# Patient Record
Sex: FEMALE | Race: WHITE | Employment: UNEMPLOYED | RURAL
[De-identification: names, ages, dates, MRNs, and addresses within clinical notes are randomized per-mention and may not be internally consistent; named-entity substitution may affect disease eponyms.]

---

## 2017-02-16 ENCOUNTER — TELEPHONE (OUTPATIENT)
Dept: PRIMARY CARE CLINIC | Age: 37
End: 2017-02-16

## 2017-02-16 DIAGNOSIS — J01.90 ACUTE NON-RECURRENT SINUSITIS, UNSPECIFIED LOCATION: ICD-10-CM

## 2017-02-17 RX ORDER — AMOXICILLIN AND CLAVULANATE POTASSIUM 875; 125 MG/1; MG/1
1 TABLET, FILM COATED ORAL 2 TIMES DAILY
Qty: 20 TABLET | Refills: 0 | Status: SHIPPED | OUTPATIENT
Start: 2017-02-17 | End: 2017-02-27

## 2017-03-03 ENCOUNTER — OFFICE VISIT (OUTPATIENT)
Dept: INTERNAL MEDICINE | Facility: CLINIC | Age: 37
End: 2017-03-03

## 2017-03-03 VITALS
OXYGEN SATURATION: 99 % | DIASTOLIC BLOOD PRESSURE: 62 MMHG | SYSTOLIC BLOOD PRESSURE: 102 MMHG | BODY MASS INDEX: 25.3 KG/M2 | WEIGHT: 134 LBS | HEIGHT: 61 IN | TEMPERATURE: 98.1 F | HEART RATE: 77 BPM

## 2017-03-03 DIAGNOSIS — R68.89 FLU-LIKE SYMPTOMS: ICD-10-CM

## 2017-03-03 DIAGNOSIS — F50.9 EATING DISORDER: ICD-10-CM

## 2017-03-03 DIAGNOSIS — R53.82 CHRONIC FATIGUE: ICD-10-CM

## 2017-03-03 DIAGNOSIS — Z00.00 PREVENTATIVE HEALTH CARE: ICD-10-CM

## 2017-03-03 DIAGNOSIS — D50.8 IRON DEFICIENCY ANEMIA SECONDARY TO INADEQUATE DIETARY IRON INTAKE: ICD-10-CM

## 2017-03-03 DIAGNOSIS — R39.9 URINARY SYMPTOM OR SIGN: ICD-10-CM

## 2017-03-03 DIAGNOSIS — R11.2 NON-INTRACTABLE VOMITING WITH NAUSEA, UNSPECIFIED VOMITING TYPE: ICD-10-CM

## 2017-03-03 DIAGNOSIS — N20.0 RECURRENT KIDNEY STONES: Primary | ICD-10-CM

## 2017-03-03 DIAGNOSIS — Z98.84 S/P GASTRIC BYPASS: ICD-10-CM

## 2017-03-03 PROBLEM — R91.1 SOLITARY LUNG NODULE: Status: ACTIVE | Noted: 2017-03-03

## 2017-03-03 LAB
BILIRUB BLD-MCNC: NEGATIVE MG/DL
CLARITY, POC: ABNORMAL
COLOR UR: YELLOW
GLUCOSE UR STRIP-MCNC: NEGATIVE MG/DL
KETONES UR QL: NEGATIVE
LEUKOCYTE EST, POC: ABNORMAL
NITRITE UR-MCNC: NEGATIVE MG/ML
PH UR: 8 [PH] (ref 5–8)
PROT UR STRIP-MCNC: NEGATIVE MG/DL
RBC # UR STRIP: NEGATIVE /UL
SP GR UR: 1.01 (ref 1–1.03)
UROBILINOGEN UR QL: NORMAL

## 2017-03-03 PROCEDURE — 96372 THER/PROPH/DIAG INJ SC/IM: CPT | Performed by: PHYSICIAN ASSISTANT

## 2017-03-03 PROCEDURE — 81003 URINALYSIS AUTO W/O SCOPE: CPT | Performed by: PHYSICIAN ASSISTANT

## 2017-03-03 PROCEDURE — 87804 INFLUENZA ASSAY W/OPTIC: CPT | Performed by: PHYSICIAN ASSISTANT

## 2017-03-03 PROCEDURE — 99214 OFFICE O/P EST MOD 30 MIN: CPT | Performed by: PHYSICIAN ASSISTANT

## 2017-03-03 PROCEDURE — 36415 COLL VENOUS BLD VENIPUNCTURE: CPT | Performed by: PHYSICIAN ASSISTANT

## 2017-03-03 RX ORDER — KETOROLAC TROMETHAMINE 10 MG/1
10 TABLET, FILM COATED ORAL EVERY 6 HOURS PRN
Qty: 30 TABLET | Refills: 0 | Status: SHIPPED | OUTPATIENT
Start: 2017-03-03 | End: 2017-03-08

## 2017-03-03 RX ORDER — PROMETHAZINE HYDROCHLORIDE 25 MG/1
25 TABLET ORAL EVERY 6 HOURS PRN
Qty: 20 TABLET | Refills: 0 | Status: SHIPPED | OUTPATIENT
Start: 2017-03-03 | End: 2018-03-26 | Stop reason: SDUPTHER

## 2017-03-03 RX ORDER — FERROUS GLUCONATE 324(37.5)
TABLET ORAL
COMMUNITY
Start: 2016-03-16 | End: 2017-03-03

## 2017-03-03 RX ORDER — TAMSULOSIN HYDROCHLORIDE 0.4 MG/1
CAPSULE ORAL
COMMUNITY
Start: 2016-11-26 | End: 2017-03-23

## 2017-03-03 RX ORDER — KETOROLAC TROMETHAMINE 15 MG/ML
30 INJECTION, SOLUTION INTRAMUSCULAR; INTRAVENOUS ONCE
Status: COMPLETED | OUTPATIENT
Start: 2017-03-03 | End: 2017-03-03

## 2017-03-03 RX ADMIN — KETOROLAC TROMETHAMINE 30 MG: 15 INJECTION, SOLUTION INTRAMUSCULAR; INTRAVENOUS at 14:53

## 2017-03-03 NOTE — PROGRESS NOTES
Subjective   Luz Maria Andrade is a 36 y.o. female who presents to establish care with me.    Chief Complaint:  Kidney stones every month for at least 6 months. Knows she has one when she has pelvic pain. Most recently has had one for 2 weeks with intermittent pelvic pain. Has had some hematuria when wiping. Taking Flomax prn with stones.  Takes ibuprofen and tylenol for pain but still unable to sleep well due to pain.       Began having nasal congestion and fever last night. Having a little cough and diffuse body aches. No influenza vaccine this year.     About 6 months ago had incidental finding of lung nodule and liver nodule. Has seen Dr. Cordova for lung nodule. Liver follow up showed growth and then later shrinking of the nodule.     Gastric bypass surgery 5/27/16. Since then has not been able to get herself to eat and gets very anxious if she gains any weight.  Feels hungry but mentally overrides this frequently and avoids eating. Goal weight was supposed to be 175 pounds and she is currently 134.       The following portions of the patient's history were reviewed and updated as appropriate: allergies, current medications, past family history, past medical history, past social history, past surgical history and problem list.    Review of Systems  Review of Systems   Constitutional: Positive for chills and fever. Negative for appetite change, fatigue and unexpected weight change.        No malaise   HENT: Positive for congestion, ear pain, rhinorrhea and sore throat. Negative for hearing loss, nosebleeds, trouble swallowing and voice change.    Eyes: Negative for pain, discharge, redness, itching and visual disturbance.        No dry eyes   Respiratory: Negative for cough, shortness of breath and wheezing.         No SOB with exertion  No SOB lying down   Cardiovascular: Negative for chest pain, palpitations and leg swelling.        No leg cramps     Gastrointestinal: Negative for abdominal pain, blood in stool,  "constipation, diarrhea, nausea and vomiting.        No change in bowel habits  No heartburn   Endocrine: Negative for cold intolerance, heat intolerance, polydipsia, polyphagia and polyuria.        No hot flashes  No muscle weakness  No feeling of weakness   Genitourinary: Positive for difficulty urinating, dysuria, hematuria and pelvic pain. Negative for dyspareunia, frequency, menstrual problem, urgency, vaginal discharge and vaginal pain.        No urinary incontinence  No change in periods   Musculoskeletal: Negative for arthralgias, joint swelling and myalgias.        No limb pain  No limb swelling   Skin: Negative for rash and wound.        No rash  No lesions  No change in mole  No itching   Neurological: Negative for dizziness, seizures, syncope, weakness, numbness and headaches.        No confusion  No tingling  No trouble walking   Hematological: Negative for adenopathy. Does not bruise/bleed easily.   Psychiatric/Behavioral: Positive for behavioral problems (will not eat) and sleep disturbance. Negative for confusion, dysphoric mood and suicidal ideas. The patient is not nervous/anxious.         No change in personality         Objective    Visit Vitals   • /62   • Pulse 77   • Temp 98.1 °F (36.7 °C)   • Ht 61\" (154.9 cm)   • Wt 134 lb (60.8 kg)   • SpO2 99%   • BMI 25.32 kg/m2     Physical Exam   Constitutional: She is oriented to person, place, and time. She appears well-developed and well-nourished. No distress.   HENT:   Head: Normocephalic and atraumatic.   Mouth/Throat: Oropharynx is clear and moist. No oropharyngeal exudate.   Left TM perforation. Right TM bulging.    Eyes: EOM are normal. Pupils are equal, round, and reactive to light.   Neck: Normal range of motion. Neck supple. No thyromegaly present.   Cardiovascular: Normal rate, regular rhythm and normal heart sounds.  Exam reveals no gallop and no friction rub.    No murmur heard.  Pulmonary/Chest: Effort normal and breath sounds " normal. No respiratory distress. She has no wheezes. She has no rales. She exhibits no tenderness.   Abdominal: Soft. Bowel sounds are normal. She exhibits no distension and no mass. There is tenderness (RUQ and RLQ tenderness). There is no rebound and no guarding. No hernia.   Musculoskeletal: Normal range of motion.   Lymphadenopathy:     She has no cervical adenopathy.   Neurological: She is alert and oriented to person, place, and time. She exhibits normal muscle tone.   Skin: Skin is warm and dry. She is not diaphoretic.   Psychiatric: She has a normal mood and affect. Her behavior is normal. Judgment and thought content normal.   Nursing note and vitals reviewed.        Assessment/Plan     1. Recurrent kidney stones  - POCT urinalysis dipstick, automated  - Urine culture (clean catch); Future  - Ambulatory Referral to Urology  - Comprehensive Metabolic Panel; Future  - TSH; Future  - PTH, Intact; Future  - Calcium, Ionized; Future  - ketorolac (TORADOL) injection 30 mg; Infuse 30 mg into a venous catheter 1 (One) Time.  - ketorolac (TORADOL) 10 MG tablet; Take 1 tablet by mouth Every 6 (Six) Hours As Needed for moderate pain (4-6) for up to 5 days.  Dispense: 30 tablet; Refill: 0    2. Flu-like symptoms  Influenza A/B negative.   Aggressive rehydration and Tylenol prn.     3. Non-intractable vomiting with nausea, unspecified vomiting type  - promethazine (PHENERGAN) 25 MG tablet; Take 1 tablet by mouth Every 6 (Six) Hours As Needed for nausea or vomiting.  Dispense: 20 tablet; Refill: 0  - Comprehensive Metabolic Panel; Future    4. Eating disorder  - Comprehensive Metabolic Panel; Future  - TSH; Future  - Ambulatory Referral to Psychology  - Vitamin B12; Future  - Vitamin D 25 Hydroxy; Future  - Iron Profile; Future    5. S/P gastric bypass  - Comprehensive Metabolic Panel; Future  - Ambulatory Referral to Psychology  - Vitamin B12; Future  - Vitamin D 25 Hydroxy; Future  - Iron Profile; Future    6.  Preventative health care  - Lipid Panel; Future    7. Iron deficiency anemia secondary to inadequate dietary iron intake  - Iron Profile; Future    8. Chronic fatigue  - Vitamin B12; Future  - Vitamin D 25 Hydroxy; Future  - Iron Profile; Future

## 2017-03-04 LAB
25(OH)D3+25(OH)D2 SERPL-MCNC: 23 NG/ML
ALBUMIN SERPL-MCNC: 4.1 G/DL (ref 3.2–4.8)
ALBUMIN/GLOB SERPL: 1.3 G/DL (ref 1.5–2.5)
ALP SERPL-CCNC: 82 U/L (ref 25–100)
ALT SERPL-CCNC: 10 U/L (ref 7–40)
AST SERPL-CCNC: 19 U/L (ref 0–33)
BILIRUB SERPL-MCNC: 0.3 MG/DL (ref 0.3–1.2)
BUN SERPL-MCNC: 13 MG/DL (ref 9–23)
BUN/CREAT SERPL: 21.7 (ref 7–25)
CA-I SERPL ISE-MCNC: 5.3 MG/DL (ref 4.5–5.6)
CALCIUM SERPL-MCNC: 9.5 MG/DL (ref 8.7–10.4)
CHLORIDE SERPL-SCNC: 103 MMOL/L (ref 99–109)
CHOLEST SERPL-MCNC: 196 MG/DL (ref 0–200)
CO2 SERPL-SCNC: 26 MMOL/L (ref 20–31)
CREAT SERPL-MCNC: 0.6 MG/DL (ref 0.6–1.3)
GLOBULIN SER CALC-MCNC: 3.2 GM/DL
GLUCOSE SERPL-MCNC: 69 MG/DL (ref 70–100)
HDLC SERPL-MCNC: 50 MG/DL (ref 40–60)
IRON SATN MFR SERPL: 17 % (ref 15–50)
IRON SERPL-MCNC: 64 MCG/DL (ref 50–175)
LDLC SERPL CALC-MCNC: 128 MG/DL (ref 0–100)
POTASSIUM SERPL-SCNC: 4.4 MMOL/L (ref 3.5–5.5)
PROT SERPL-MCNC: 7.3 G/DL (ref 5.7–8.2)
PTH-INTACT SERPL-MCNC: 16 PG/ML (ref 15–65)
SODIUM SERPL-SCNC: 138 MMOL/L (ref 132–146)
TIBC SERPL-MCNC: 383 MCG/DL (ref 250–450)
TRIGL SERPL-MCNC: 88 MG/DL (ref 0–150)
TSH SERPL DL<=0.005 MIU/L-ACNC: 1.86 MIU/ML (ref 0.35–5.35)
UIBC SERPL-MCNC: 319 MCG/DL
VIT B12 SERPL-MCNC: 251 PG/ML (ref 211–911)
VLDLC SERPL CALC-MCNC: 17.6 MG/DL

## 2017-03-06 LAB
BACTERIA UR CULT: ABNORMAL
BACTERIA UR CULT: ABNORMAL
OTHER ANTIBIOTIC SUSC ISLT: ABNORMAL

## 2017-03-06 RX ORDER — NITROFURANTOIN 25; 75 MG/1; MG/1
100 CAPSULE ORAL 2 TIMES DAILY
Qty: 14 CAPSULE | Refills: 0 | Status: SHIPPED | OUTPATIENT
Start: 2017-03-06 | End: 2017-03-13

## 2017-03-09 LAB
EXPIRATION DATE: NORMAL
FLUAV AG NPH QL: NEGATIVE
FLUBV AG NPH QL: NEGATIVE
INTERNAL CONTROL: NORMAL
Lab: NORMAL

## 2017-03-21 ENCOUNTER — TELEPHONE (OUTPATIENT)
Dept: INTERNAL MEDICINE | Facility: CLINIC | Age: 37
End: 2017-03-21

## 2017-03-21 NOTE — TELEPHONE ENCOUNTER
Pt said she has a bad kidney stone, the toradol you gave her does not work, she said you told her if that did not work that you would send in tramadol, please call pt back with what to do

## 2017-03-21 NOTE — TELEPHONE ENCOUNTER
I do not prescribe Tramadol. She needs to schedule with Dr. Benton if that is what she feels she needs.

## 2017-03-22 ENCOUNTER — TELEPHONE (OUTPATIENT)
Dept: INTERNAL MEDICINE | Facility: CLINIC | Age: 37
End: 2017-03-22

## 2017-03-22 NOTE — TELEPHONE ENCOUNTER
SPOKE TO PATIENT - INFORMED HER THAT ADRIANNA WAS OUT TODAY BUT PER HER OFFICE NOTE SHE ADVISES HER TO COME IN IF SYMPTOMS GET WORSE OR FAIL TO IMPROVE.    PATIENT STATES SHE HAS SEVERAL APPOINTMENTS COMING UP SO SHE WILL GIVE US A CALL BACK

## 2017-03-22 NOTE — TELEPHONE ENCOUNTER
----- Message from Kisha Gonsales sent at 3/22/2017 11:07 AM EDT -----  Contact: PATIENT   PLEASE RETURN CALL. PATIENT SAID THAT THE PAIN MEDICATION ISN'T HELPING HER KIDNEY STONES. WOULD LIKE TO KNOW WHAT ADRIANNA RECOMMENDS.

## 2017-03-23 ENCOUNTER — OFFICE VISIT (OUTPATIENT)
Dept: UROLOGY | Facility: CLINIC | Age: 37
End: 2017-03-23

## 2017-03-23 ENCOUNTER — HOSPITAL ENCOUNTER (OUTPATIENT)
Dept: GENERAL RADIOLOGY | Facility: HOSPITAL | Age: 37
Discharge: HOME | End: 2017-03-23
Attending: UROLOGY | Admitting: UROLOGY

## 2017-03-23 VITALS
HEIGHT: 61 IN | BODY MASS INDEX: 24.55 KG/M2 | HEART RATE: 88 BPM | OXYGEN SATURATION: 98 % | DIASTOLIC BLOOD PRESSURE: 70 MMHG | SYSTOLIC BLOOD PRESSURE: 125 MMHG | WEIGHT: 130 LBS

## 2017-03-23 DIAGNOSIS — N20.0 NEPHROLITHIASIS: ICD-10-CM

## 2017-03-23 DIAGNOSIS — R10.9 FLANK PAIN: ICD-10-CM

## 2017-03-23 DIAGNOSIS — Z87.442 PERSONAL HISTORY OF KIDNEY STONES: Primary | ICD-10-CM

## 2017-03-23 LAB
BILIRUB BLD-MCNC: NEGATIVE MG/DL
CLARITY, POC: CLEAR
COLOR UR: YELLOW
GLUCOSE UR STRIP-MCNC: NEGATIVE MG/DL
KETONES UR QL: ABNORMAL
LEUKOCYTE EST, POC: NEGATIVE
NITRITE UR-MCNC: NEGATIVE MG/ML
PH UR: 7.5 [PH] (ref 5–8)
PROT UR STRIP-MCNC: NEGATIVE MG/DL
RBC # UR STRIP: NEGATIVE /UL
SP GR UR: 1.02 (ref 1–1.03)
UROBILINOGEN UR QL: NORMAL

## 2017-03-23 PROCEDURE — 99203 OFFICE O/P NEW LOW 30 MIN: CPT | Performed by: UROLOGY

## 2017-03-23 PROCEDURE — 81003 URINALYSIS AUTO W/O SCOPE: CPT | Performed by: UROLOGY

## 2017-03-23 PROCEDURE — 74000 HC ABDOMEN KUB: CPT

## 2017-03-23 RX ORDER — TAMSULOSIN HYDROCHLORIDE 0.4 MG/1
1 CAPSULE ORAL DAILY
Qty: 30 CAPSULE | Refills: 11 | Status: SHIPPED | OUTPATIENT
Start: 2017-03-23 | End: 2017-10-16 | Stop reason: SDUPTHER

## 2017-03-23 RX ORDER — FERROUS GLUCONATE 324(37.5)
TABLET ORAL
COMMUNITY
Start: 2016-03-16 | End: 2017-10-17

## 2017-03-23 RX ORDER — TAMSULOSIN HYDROCHLORIDE 0.4 MG/1
CAPSULE ORAL
COMMUNITY
Start: 2016-11-26 | End: 2017-03-23 | Stop reason: SDUPTHER

## 2017-03-23 RX ORDER — TRAMADOL HYDROCHLORIDE 50 MG/1
50 TABLET ORAL EVERY 6 HOURS PRN
Qty: 30 TABLET | Refills: 1 | Status: SHIPPED | OUTPATIENT
Start: 2017-03-23 | End: 2018-03-23

## 2017-03-23 RX ORDER — ONDANSETRON 4 MG/1
TABLET, ORALLY DISINTEGRATING ORAL EVERY 8 HOURS
COMMUNITY
Start: 2016-09-01 | End: 2017-10-17

## 2017-03-23 RX ORDER — NORGESTIMATE AND ETHINYL ESTRADIOL 7DAYSX3 LO
KIT ORAL
COMMUNITY
Start: 2016-03-14 | End: 2017-10-17

## 2017-03-23 NOTE — PROGRESS NOTES
Chief Complaint  Nephrolithiasis (PATIENT IS BEING SEEN FOR A HISTORY OF KIDNEY STONES AND FLANK PAIN.)      BLAYNE Andrade is a 36 y.o.female who is referred for evaluation of kidney stones stating she has passed 16 stones spontaneously in her life over the last 3-4 years.  CT scan last September revealed numerous punctate stones scattered throughout her urinary tract but none to large to pass.  She has had a gastric bypass which is a risk factor for stones but it had several stones prior to the procedure.  Her family history is an unknown quantity with no knowledge of her parents family she has lost significant weight since her gastric bypass.  She was instructed to discontinue her calcium supplement because of the stones.  She has been able to handle her pain mainly with Ultram and daily Flomax.    Vitals:    03/23/17 0916   BP: 125/70   Pulse: 88   SpO2: 98%       Past Medical History  Past Medical History:   Diagnosis Date   • Anemia    • Clotting disorder    • Kidney stone    • Ovarian cyst        Past Surgical History  Past Surgical History:   Procedure Laterality Date   • CHOLECYSTECTOMY     • GASTRIC BYPASS     • GASTRIC BYPASS         Medications  has a current medication list which includes the following prescription(s): ferrous gluconate, norgestimate-ethinyl estradiol, ondansetron odt, tamsulosin, vitamin b 12, iron-vitamin c, promethazine, and tamsulosin.    Allergies  No Known Allergies    Social History  Social History     Social History   • Marital status:      Spouse name: N/A   • Number of children: N/A   • Years of education: N/A     Occupational History   • Not on file.     Social History Main Topics   • Smoking status: Never Smoker   • Smokeless tobacco: Never Used   • Alcohol use No   • Drug use: No   • Sexual activity: Not on file     Other Topics Concern   • Not on file     Social History Narrative       Family History  Family History   Problem Relation Age of Onset   • Heart  failure Father    • Bleeding Disorder Father        Review of Systems  Review of Systems  Positive for fatigue wheezing nausea kidney stones easy bruising or prolonged bleeding normal menstrual periods normal and other categories.  Physical Exam  Physical Exam   Constitutional: She is oriented to person, place, and time. She appears well-developed and well-nourished.   HENT:   Head: Normocephalic.   Eyes: EOM are normal. Pupils are equal, round, and reactive to light.   Neck: Normal range of motion. Neck supple.   Cardiovascular: Normal rate and regular rhythm.    Pulmonary/Chest: Effort normal.   Abdominal: Soft. Bowel sounds are normal.   Musculoskeletal: Normal range of motion. She exhibits no edema.   Neurological: She is alert and oriented to person, place, and time.   Skin: Skin is warm and dry.   Psychiatric: She has a normal mood and affect. Her behavior is normal.       Labs recent and today in the office:  Results for orders placed or performed in visit on 03/23/17   POC Urinalysis Dipstick, Automated   Result Value Ref Range    Color Yellow Yellow, Straw, Dark Yellow, Julia    Clarity, UA Clear Clear    Glucose, UA Negative Negative, 1000 mg/dL (3+) mg/dL    Bilirubin Negative Negative    Ketones, UA 2+ (A) Negative    Specific Gravity  1.020 1.005 - 1.030    Blood, UA Negative Negative    pH, Urine 7.5 5.0 - 8.0    Protein, POC Negative Negative mg/dL    Urobilinogen, UA Normal Normal    Leukocytes Negative Negative    Nitrite, UA Negative Negative         Assessment & Plan  Instructed to continue his calcium supplement but he should always be taken with meals.  This will bind oxalate in her food without raising her calcium excretion.  She is informed of the proper diet to reduce her tendency for kidney stones and scheduled for metabolic evaluation with a 24-hour urine.  She'll return for treatment recommendations.

## 2017-03-23 NOTE — TELEPHONE ENCOUNTER
Since it has been a few days since she sent this will you call and ask if she still feels she needs it. If so, please route a prescription for Tramadol to Dr. Benton for quantity of 20.

## 2017-03-25 ENCOUNTER — OFFICE VISIT (OUTPATIENT)
Dept: PRIMARY CARE CLINIC | Age: 37
End: 2017-03-25
Payer: COMMERCIAL

## 2017-03-25 VITALS
BODY MASS INDEX: 24.39 KG/M2 | HEART RATE: 108 BPM | WEIGHT: 129.2 LBS | OXYGEN SATURATION: 98 % | SYSTOLIC BLOOD PRESSURE: 96 MMHG | HEIGHT: 61 IN | DIASTOLIC BLOOD PRESSURE: 72 MMHG | TEMPERATURE: 97.8 F | RESPIRATION RATE: 20 BRPM

## 2017-03-25 DIAGNOSIS — J20.9 ACUTE BRONCHITIS, UNSPECIFIED ORGANISM: ICD-10-CM

## 2017-03-25 DIAGNOSIS — R52 BODY ACHES: Primary | ICD-10-CM

## 2017-03-25 DIAGNOSIS — R50.9 FEVER, UNSPECIFIED FEVER CAUSE: ICD-10-CM

## 2017-03-25 LAB
INFLUENZA A ANTIBODY: NORMAL
INFLUENZA B ANTIBODY: NORMAL

## 2017-03-25 PROCEDURE — 99213 OFFICE O/P EST LOW 20 MIN: CPT | Performed by: NURSE PRACTITIONER

## 2017-03-25 PROCEDURE — 87804 INFLUENZA ASSAY W/OPTIC: CPT | Performed by: NURSE PRACTITIONER

## 2017-03-25 RX ORDER — AMOXICILLIN AND CLAVULANATE POTASSIUM 875; 125 MG/1; MG/1
1 TABLET, FILM COATED ORAL EVERY 12 HOURS
Qty: 20 TABLET | Refills: 0 | Status: SHIPPED | OUTPATIENT
Start: 2017-03-25 | End: 2017-04-04

## 2017-03-25 RX ORDER — FLUCONAZOLE 100 MG/1
TABLET ORAL
Qty: 2 TABLET | Refills: 0 | Status: SHIPPED | OUTPATIENT
Start: 2017-03-25 | End: 2020-02-23 | Stop reason: SDUPTHER

## 2017-03-25 RX ORDER — IRON,CARBONYL/ASCORBIC ACID 100-250 MG
TABLET ORAL
COMMUNITY
End: 2018-03-22

## 2017-03-25 RX ORDER — PROMETHAZINE HYDROCHLORIDE 25 MG/1
25 TABLET ORAL
COMMUNITY
Start: 2017-03-03 | End: 2020-02-23 | Stop reason: SDUPTHER

## 2017-03-25 RX ORDER — NORGESTIMATE AND ETHINYL ESTRADIOL 7DAYSX3 LO
KIT ORAL
COMMUNITY
Start: 2016-03-14 | End: 2018-03-22

## 2017-03-25 RX ORDER — BENZONATATE 100 MG/1
100 CAPSULE ORAL 3 TIMES DAILY PRN
Qty: 30 CAPSULE | Refills: 0 | Status: SHIPPED | OUTPATIENT
Start: 2017-03-25 | End: 2017-04-01

## 2017-03-25 ASSESSMENT — ENCOUNTER SYMPTOMS
WHEEZING: 1
SORE THROAT: 1
COUGH: 1

## 2017-06-05 ENCOUNTER — OFFICE VISIT (OUTPATIENT)
Dept: PSYCHIATRY | Facility: CLINIC | Age: 37
End: 2017-06-05

## 2017-06-05 DIAGNOSIS — F50.9 EATING DISORDER, UNSPECIFIED: Primary | ICD-10-CM

## 2017-06-05 PROCEDURE — 90791 PSYCH DIAGNOSTIC EVALUATION: CPT | Performed by: SOCIAL WORKER

## 2017-06-05 NOTE — PROGRESS NOTES
"Subjective   Patient ID: Luz Maria Andrade is a 36 y.o. female is being seen for consultation today at the request of her PCP. Patient was born in [facility]. Patients lived many places due to her father being in the [facility]. Patient lives in [place].  Patient has been  for 15 years and has one adopted daughter that is 7 years old and has a 2 month old that is a foster child. Patient is unable to have children due to health conditions. Patient was homeschooled and has her GED.    Chief Complaint:eating disorder    History of Present Illness:patient reports she had gastric bypass last year and after having surgery become very obsessive about being thin and never was thin enough. Patient lost and excessive amount of weight beyond her docotrs instruction, purging what she ate and started using diet pills until her  and family became concerned and talked to her and she has stopped purging and taking diet pills in the past few months but struggles with feeling \"fat\" or \"overwieght\" and wanting to work on body image issues and feels she has body dysmorphia. Patient states \"I fight it everyday wanting to thinner\" and \"feeling like I am really fat\". Patient lost 137 pounds in year. patietn states \"I want to feel satisfied that I have lost this weight but I am fearful that I will gain it back\". Patient denies depression, anxiety or trauma hx. Patient denies substance use, abuse or dependence. Patient denies perceptual disturbances, patient denies auditory or visual hallucinations.     The following portions of the patient's history were reviewed and updated as appropriate: allergies, current medications, past family history, past medical history, past social history, past surgical history and problem list.    Past Psych History: Patient denies past psychiatric hospitalizations.  Patient denies past suicide attempts.  Patient denies past psychiatric medications.  Patient denies past outpatient " treatment.    Substance Use History: Patient denies all substance abuse history    Medical History:   Past Medical History:   Diagnosis Date   • Anemia    • Clotting disorder    • Kidney stone    • Ovarian cyst     Polycystic ovary syndrome  Wears glasses    Past Surgical History:   Procedure Laterality Date   • CHOLECYSTECTOMY     • GASTRIC BYPASS     • GASTRIC BYPASS         Medications:   Current Outpatient Prescriptions:   •  Cyanocobalamin (VITAMIN B 12) 100 MCG lozenge, Take  by mouth., Disp: , Rfl:   •  ferrous gluconate 324 (37.5 FE) MG tablet tablet, Take  by mouth., Disp: , Rfl:   •  Iron-Vitamin C (IRON 100/C PO), Take  by mouth., Disp: , Rfl:   •  norgestimate-ethinyl estradiol (ORTHO TRI-CYCLEN LO) 0.18/0.215/0.25 MG-25 MCG per tablet, Take  by mouth., Disp: , Rfl:   •  ondansetron ODT (ZOFRAN-ODT) 4 MG disintegrating tablet, Take  by mouth Every 8 (Eight) Hours., Disp: , Rfl:   •  promethazine (PHENERGAN) 25 MG tablet, Take 1 tablet by mouth Every 6 (Six) Hours As Needed for nausea or vomiting., Disp: 20 tablet, Rfl: 0  •  tamsulosin (FLOMAX) 0.4 MG capsule 24 hr capsule, Take 1 capsule by mouth Daily., Disp: 30 capsule, Rfl: 11  •  traMADol (ULTRAM) 50 MG tablet, Take 1 tablet by mouth Every 6 (Six) Hours As Needed for Severe Pain (7-10)., Disp: 30 tablet, Rfl: 1    No Known Allergies no known drug allergies    Review of Systems    Family History   Family History   Problem Relation Age of Onset   • Heart failure Father    • Bleeding Disorder Father    Daughter with ADHD and anxiety   with anxiety      Social History: Patient was born in [place] and with the  brat.  Patient's father raised her after her mother gave her to her father in 2 weeks of age.  Patient denies any learning or behavior issues growing up.  Patient was raised as an only child.  Patient would having any that cared for her when father was working.  Patient has 4/2 sisters and one brother on her mother's side.   Patient's  works and provides for the family.  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  Patient's work history is 15 years at [employer] and that is the longest job she has had.  Patient denies serving in the .  Patient denies ever being arrested.  Patient enjoys vacations and spending time with her friends at [employer].  Patient is sexually active and her sexual preferences men and identifies as heterosexual.    Objective   Mental Status Exam  Hygiene:  good  Dress:  casual  Attitude:  Cooperative  Motor Activity:  Appropriate  Speech:  Normal  Mood:  sad  Affect:  Sad  Thought Processes:  Goal directed  Thought Content:  normal  Suicidal Thoughts:  denies  Homicidal Thoughts:  denies  Crisis Safety Plan: yes, to come to the emergency room.  Hallucinations:  denies      Strengths: Motivated for treatment, Literate and Has insight    Weaknesses:Unemployed    eating disorder      Short term goals: Develop rapport and encourage compliance with treatment plan and followups.  The patient to contact this office, call 911 or present to the nearest emergency room should suicidal or homicidal ideations occur.    Long term goals: The patient will have complete cessation of symptoms and be able to function at optimal levels without the need for continued treatment..      Lab Review:   not applicable  Assessment/Plan   Diagnoses and all orders for this visit:    Eating disorder, unspecified    Return in about 2 weeks (around 6/19/2017), or if symptoms worsen or fail to improve.    Plan: The patient will be seen at least monthly for outpatient psychotherapy sessions and follow all recommendations. The patient will follow safety plan if suicidal or homicidal ideations occur. The patient will make appointment with Sapna Mishra to be assessed for medication management to assist with symptoms of depression and anxiety.  Will work with therapist on coping skills using solution focused and CBT.

## 2017-10-16 ENCOUNTER — TELEPHONE (OUTPATIENT)
Dept: PULMONOLOGY | Facility: CLINIC | Age: 37
End: 2017-10-16

## 2017-10-16 DIAGNOSIS — Z87.442 PERSONAL HISTORY OF KIDNEY STONES: ICD-10-CM

## 2017-10-16 RX ORDER — TAMSULOSIN HYDROCHLORIDE 0.4 MG/1
1 CAPSULE ORAL DAILY
Qty: 30 CAPSULE | Refills: 11 | Status: SHIPPED | OUTPATIENT
Start: 2017-10-16 | End: 2018-06-28

## 2017-10-16 NOTE — TELEPHONE ENCOUNTER
States she has a history of kidney stones and would like to have something called in to ease the pain.     She cannot come in for an appt because her  is driving her car to work this week.

## 2017-10-16 NOTE — TELEPHONE ENCOUNTER
Patient was called she called to cancel her apt for ct scan that was scheduled for 10/02/2017@10 am. Patient also called to cancel [facility] apt she states she doesn't want to keep her follow up apt will call back later to schedule.

## 2017-10-16 NOTE — TELEPHONE ENCOUNTER
Pt informed we can send in flomax but may not be able to do pain med without being seen, told her I would let her know

## 2017-10-17 ENCOUNTER — OFFICE VISIT (OUTPATIENT)
Dept: INTERNAL MEDICINE | Facility: CLINIC | Age: 37
End: 2017-10-17

## 2017-10-17 VITALS
BODY MASS INDEX: 23.6 KG/M2 | TEMPERATURE: 98.4 F | HEART RATE: 74 BPM | SYSTOLIC BLOOD PRESSURE: 122 MMHG | HEIGHT: 61 IN | WEIGHT: 125 LBS | DIASTOLIC BLOOD PRESSURE: 74 MMHG

## 2017-10-17 DIAGNOSIS — N20.0 NEPHROLITHIASIS: Primary | ICD-10-CM

## 2017-10-17 LAB
BILIRUB BLD-MCNC: ABNORMAL MG/DL
CLARITY, POC: ABNORMAL
COLOR UR: YELLOW
GLUCOSE UR STRIP-MCNC: NEGATIVE MG/DL
KETONES UR QL: NEGATIVE
LEUKOCYTE EST, POC: ABNORMAL
NITRITE UR-MCNC: NEGATIVE MG/ML
PH UR: 5 [PH] (ref 5–8)
PROT UR STRIP-MCNC: NEGATIVE MG/DL
RBC # UR STRIP: NEGATIVE /UL
SP GR UR: 1.02 (ref 1–1.03)
UROBILINOGEN UR QL: ABNORMAL

## 2017-10-17 PROCEDURE — 99213 OFFICE O/P EST LOW 20 MIN: CPT | Performed by: PHYSICIAN ASSISTANT

## 2017-10-17 PROCEDURE — 81003 URINALYSIS AUTO W/O SCOPE: CPT | Performed by: PHYSICIAN ASSISTANT

## 2017-10-17 RX ORDER — HYDROCODONE BITARTRATE AND ACETAMINOPHEN 5; 325 MG/1; MG/1
1 TABLET ORAL EVERY 6 HOURS PRN
Qty: 20 TABLET | Refills: 0 | Status: CANCELLED | OUTPATIENT
Start: 2017-10-17

## 2017-10-17 RX ORDER — OXYCODONE HYDROCHLORIDE AND ACETAMINOPHEN 5; 325 MG/1; MG/1
TABLET ORAL
Qty: 25 TABLET | Refills: 0 | Status: SHIPPED | OUTPATIENT
Start: 2017-10-17 | End: 2018-02-02 | Stop reason: SDUPTHER

## 2017-10-17 NOTE — PROGRESS NOTES
Luz Maria Andrade is a 37 y.o. female.     Subjective   History of Present Illness   Sudden right flank pain for the last 4 days, worse with urination. Has had some hematuria at times since onset of pain.  Does not feel like the pain is changing locations.  Pain is causing her to feel nauseous and has vomited a few times.  Unable to sleep due to pain.  Saw Dr. Saavedra for evaluation who was in favor of keeping her on Flomax nightly. She will see him again in December.         The following portions of the patient's history were reviewed and updated as appropriate: allergies, current medications, past family history, past medical history, past social history, past surgical history and problem list.    Review of Systems    Constitutional: Negative for appetite change, chills, fatigue, fever and unexpected weight change.   HENT: Negative for congestion, ear pain, hearing loss, nosebleeds, postnasal drip, rhinorrhea, sore throat, tinnitus and trouble swallowing.    Eyes: Negative for photophobia, discharge and visual disturbance.   Respiratory: Negative for cough, chest tightness, shortness of breath and wheezing.    Cardiovascular: Negative for chest pain, palpitations and leg swelling.   Gastrointestinal: Negative for abdominal distention, abdominal pain, blood in stool, constipation, diarrhea, nausea and vomiting.   Endocrine: Negative for cold intolerance, heat intolerance, polydipsia, polyphagia and polyuria.   Musculoskeletal: right flank pain.  Negative for arthralgias, joint swelling, myalgias, neck pain and neck stiffness.   Skin: Negative for color change, pallor, rash and wound.   Allergic/Immunologic: Negative for environmental allergies, food allergies and immunocompromised state.   Neurological: Negative for dizziness, tremors, seizures, weakness, numbness and headaches.   Hematological: Negative for adenopathy. Does not bruise/bleed easily.   Psychiatric/Behavioral: Negative for sleep disturbances,  "agitation, behavioral problems, confusion, hallucinations, self-injury and suicidal ideas. The patient is not nervous/anxious.    : pain with urination, hematuria.     Objective    Physical Exam  Constitutional: Oriented to person, place, and time. Appears well-developed and well-nourished.   HENT:   Head: Normocephalic and atraumatic.   Eyes: EOM are normal. Pupils are equal, round, and reactive to light.   Neck: Normal range of motion. Neck supple.   Cardiovascular: Normal rate, regular rhythm and normal heart sounds.    Pulmonary/Chest: Effort normal and breath sounds normal. No respiratory distress.  Has no wheezes or rales. Exhibits no chest wall tenderness.   Abdominal: Soft. Bowel sounds are normal. Exhibits no distension and no mass. There is no tenderness.   Musculoskeletal: Exquisite right CVA tenderness. Normal range of motion.  Neurological: Alert and oriented to person, place, and time.   Skin: Skin is warm and dry.   Psychiatric: Has a normal mood and affect. Behavior is normal. Judgment and thought content normal.       /74  Pulse 74  Temp 98.4 °F (36.9 °C)  Ht 61\" (154.9 cm)  Wt 125 lb (56.7 kg)  BMI 23.62 kg/m2    Nursing note and vitals reviewed.          Assessment/Plan   Luz Maria was seen today for kidney stone symptoms.    Diagnoses and all orders for this visit:    Nephrolithiasis  -     POCT urinalysis dipstick, automated        ROCKY reviewed and compliant. Percocet 5/325 mg prescribed by Dr. Benton to use q4h prn for pain associated with nephrolithiasis. She is aware of addictive potential of the medication and agrees to use sparingly.     Encouraged increased water intake. Continue Flomax nightly.      "

## 2018-02-02 ENCOUNTER — OFFICE VISIT (OUTPATIENT)
Dept: INTERNAL MEDICINE | Facility: CLINIC | Age: 38
End: 2018-02-02

## 2018-02-02 VITALS
HEIGHT: 61 IN | DIASTOLIC BLOOD PRESSURE: 66 MMHG | SYSTOLIC BLOOD PRESSURE: 102 MMHG | WEIGHT: 129 LBS | TEMPERATURE: 98.5 F | BODY MASS INDEX: 24.35 KG/M2 | OXYGEN SATURATION: 99 % | HEART RATE: 68 BPM

## 2018-02-02 DIAGNOSIS — Z98.84 S/P GASTRIC BYPASS: ICD-10-CM

## 2018-02-02 DIAGNOSIS — Z00.00 PREVENTATIVE HEALTH CARE: ICD-10-CM

## 2018-02-02 DIAGNOSIS — N20.0 RECURRENT KIDNEY STONES: ICD-10-CM

## 2018-02-02 DIAGNOSIS — D50.8 IRON DEFICIENCY ANEMIA SECONDARY TO INADEQUATE DIETARY IRON INTAKE: ICD-10-CM

## 2018-02-02 DIAGNOSIS — Z00.00 ANNUAL PHYSICAL EXAM: Primary | ICD-10-CM

## 2018-02-02 DIAGNOSIS — E53.8 B12 DEFICIENCY: ICD-10-CM

## 2018-02-02 LAB
ALBUMIN SERPL-MCNC: 4.2 G/DL (ref 3.5–5)
ALBUMIN/GLOB SERPL: 1.4 G/DL (ref 1–2)
ALP SERPL-CCNC: 68 U/L (ref 38–126)
ALT SERPL-CCNC: 23 U/L (ref 13–69)
AST SERPL-CCNC: 16 U/L (ref 15–46)
BILIRUB SERPL-MCNC: 0.4 MG/DL (ref 0.2–1.3)
BUN SERPL-MCNC: 11 MG/DL (ref 7–20)
BUN/CREAT SERPL: 18.3 (ref 7.1–23.5)
CALCIUM SERPL-MCNC: 9.5 MG/DL (ref 8.4–10.2)
CHLORIDE SERPL-SCNC: 109 MMOL/L (ref 98–107)
CHOLEST SERPL-MCNC: 168 MG/DL (ref 0–199)
CO2 SERPL-SCNC: 25 MMOL/L (ref 26–30)
CREAT SERPL-MCNC: 0.6 MG/DL (ref 0.6–1.3)
ERYTHROCYTE [DISTWIDTH] IN BLOOD BY AUTOMATED COUNT: 13.9 % (ref 11.5–14.5)
FERRITIN SERPL-MCNC: 4.96 NG/ML (ref 6.24–137)
GFR SERPLBLD CREATININE-BSD FMLA CKD-EPI: 113 ML/MIN/1.73
GFR SERPLBLD CREATININE-BSD FMLA CKD-EPI: 137 ML/MIN/1.73
GLOBULIN SER CALC-MCNC: 3 GM/DL
GLUCOSE SERPL-MCNC: 76 MG/DL (ref 74–98)
HCT VFR BLD AUTO: 35.6 % (ref 37–47)
HDLC SERPL-MCNC: 59 MG/DL (ref 40–60)
HGB BLD-MCNC: 11.2 G/DL (ref 12–16)
LDLC SERPL CALC-MCNC: 95 MG/DL (ref 0–99)
MCH RBC QN AUTO: 26.4 PG (ref 27–31)
MCHC RBC AUTO-ENTMCNC: 31.5 G/DL (ref 30–37)
MCV RBC AUTO: 83.8 FL (ref 81–99)
PLATELET # BLD AUTO: 281 10*3/MM3 (ref 130–400)
POTASSIUM SERPL-SCNC: 4.1 MMOL/L (ref 3.5–5.1)
PROT SERPL-MCNC: 7.2 G/DL (ref 6.3–8.2)
RBC # BLD AUTO: 4.25 10*6/MM3 (ref 4.2–5.4)
SODIUM SERPL-SCNC: 146 MMOL/L (ref 137–145)
TRIGL SERPL-MCNC: 72 MG/DL
VIT B12 SERPL-MCNC: 277 PG/ML (ref 239–931)
VLDLC SERPL CALC-MCNC: 14.4 MG/DL
WBC # BLD AUTO: 3.26 10*3/MM3 (ref 4.8–10.8)

## 2018-02-02 PROCEDURE — 99395 PREV VISIT EST AGE 18-39: CPT | Performed by: PHYSICIAN ASSISTANT

## 2018-02-02 RX ORDER — OXYCODONE HYDROCHLORIDE AND ACETAMINOPHEN 5; 325 MG/1; MG/1
TABLET ORAL
Qty: 25 TABLET | Refills: 0 | Status: SHIPPED | OUTPATIENT
Start: 2018-02-02 | End: 2018-06-28

## 2018-02-02 NOTE — PROGRESS NOTES
Subjective   Luz Maria Andrade is a 37 y.o. female and is here for a comprehensive physical exam. The patient reports problems - current flank pain and hematuria.    Feels she has a kidney stone in the right lower abdomen currently due to 2 days of pain and hematuria. She has a history of frequent kidney stones, most of which she can pass without too much trouble though this one is extremely painful. Takes Flomax nightly and increased water intake when she feels she has a stone. Has some Tramadol on hand which does not help he pain with larger stones.     Taking B12 supplement daily due to B12 deficiency secondary to gastric bypass procedure. Also has chronic iron deficiency anemia but does not take a supplement surrently.       Do you take any herbs or supplements that were not prescribed by a doctor? yes, B12 and children's chewable multi-vitamin daily.   Are you taking calcium supplements? No  Are you taking aspirin daily? No     History:  LMP: No LMP recorded.  Menopause: No  Last pap date: 10/2015  Abnormal pap? no         OB History    Para Term  AB Living   0 0 0 0 0 0   SAB TAB Ectopic Multiple Live Births   0 0 0 0 0            has no sexual activity history on file.        The following portions of the patient's history were reviewed and updated as appropriate: She  has a past medical history of Anemia; Clotting disorder; Kidney stone; and Ovarian cyst.  She  does not have any pertinent problems on file.  She  has a past surgical history that includes Gastric bypass; Cholecystectomy; and Gastric bypass.  Her family history includes Bleeding Disorder in her father; Heart failure in her father.  She  reports that she has never smoked. She has never used smokeless tobacco. She reports that she does not drink alcohol or use illicit drugs.  Current Outpatient Prescriptions   Medication Sig Dispense Refill   • Cyanocobalamin (VITAMIN B 12) 100 MCG lozenge Take  by mouth.     • tamsulosin (FLOMAX)  0.4 MG capsule 24 hr capsule Take 1 capsule by mouth Daily. 30 capsule 11   • oxyCODONE-acetaminophen (PERCOCET) 5-325 MG per tablet Take 1-2 tabs every 4 hours as needed for pain 25 tablet 0   • promethazine (PHENERGAN) 25 MG tablet Take 1 tablet by mouth Every 6 (Six) Hours As Needed for nausea or vomiting. 20 tablet 0   • traMADol (ULTRAM) 50 MG tablet Take 1 tablet by mouth Every 6 (Six) Hours As Needed for Severe Pain (7-10). 30 tablet 1     No current facility-administered medications for this visit.        Review of Systems  Do you have pain that bothers you in your daily life? no    Review of Systems   Constitutional: Negative for appetite change, chills, fatigue, fever and unexpected weight change.   HENT: Negative for congestion, ear pain, hearing loss, nosebleeds, postnasal drip, rhinorrhea, sore throat, tinnitus and trouble swallowing.    Eyes: Negative for photophobia, discharge and visual disturbance.   Respiratory: Negative for cough, chest tightness, shortness of breath and wheezing.    Cardiovascular: Negative for chest pain, palpitations and leg swelling.   Gastrointestinal: Negative for abdominal distention, abdominal pain, blood in stool, constipation, diarrhea, nausea and vomiting.   Endocrine: Negative for cold intolerance, heat intolerance, polydipsia, polyphagia and polyuria.   Genitourinary: Positive for dysuria, flank pain, frequency, hematuria, pelvic pain and urgency. Negative for decreased urine volume, difficulty urinating, dyspareunia, enuresis, genital sores, menstrual problem, vaginal bleeding, vaginal discharge and vaginal pain.   Musculoskeletal: Negative for arthralgias, back pain, gait problem, joint swelling, myalgias, neck pain and neck stiffness.   Skin: Negative for color change, pallor, rash and wound.   Allergic/Immunologic: Negative for environmental allergies, food allergies and immunocompromised state.   Neurological: Negative for dizziness, tremors, seizures, weakness,  "numbness and headaches.   Hematological: Negative for adenopathy. Does not bruise/bleed easily.   Psychiatric/Behavioral: Negative for agitation, behavioral problems, confusion, decreased concentration, dysphoric mood, hallucinations, self-injury and suicidal ideas. The patient is not nervous/anxious.          Objective   /66  Pulse 68  Temp 98.5 °F (36.9 °C)  Ht 154.9 cm (60.98\")  Wt 58.5 kg (129 lb)  SpO2 99%  BMI 24.39 kg/m2    Physical Exam   Constitutional: She is oriented to person, place, and time. She appears well-developed and well-nourished. No distress.   HENT:   Head: Normocephalic and atraumatic.   Right Ear: External ear normal.   Left Ear: External ear normal.   Nose: Nose normal.   Mouth/Throat: Oropharynx is clear and moist.   Eyes: EOM are normal. Pupils are equal, round, and reactive to light.   Neck: Normal range of motion. Neck supple. No thyromegaly present.   Cardiovascular: Normal rate, regular rhythm and normal heart sounds.  Exam reveals no gallop and no friction rub.    No murmur heard.  Pulmonary/Chest: Effort normal and breath sounds normal. No respiratory distress. She has no wheezes. She has no rales. She exhibits no tenderness.   Abdominal: Soft. Bowel sounds are normal. She exhibits no distension and no mass. There is tenderness (RLQ). There is no rebound and no guarding. No hernia.   Musculoskeletal: Normal range of motion. She exhibits tenderness (right CVA). She exhibits no edema or deformity.   Lymphadenopathy:     She has no cervical adenopathy.   Neurological: She is alert and oriented to person, place, and time. She displays normal reflexes. No cranial nerve deficit. She exhibits normal muscle tone. Coordination normal.   Skin: Skin is warm and dry. No rash noted. She is not diaphoretic.   Psychiatric: She has a normal mood and affect. Her behavior is normal. Judgment and thought content normal.   Nursing note and vitals reviewed.         Assessment/Plan   Healthy " female exam.     1.    Diagnosis Plan   1. Annual physical exam     2. S/P gastric bypass  CBC (No Diff)    Ferritin    Vitamin B12   3. Iron deficiency anemia secondary to inadequate dietary iron intake  Chronic condition secondary to gastric bypass. Reassess: CBC (No Diff)    Chronic condition secondary to gastric bypass. Reassess: Ferritin   4. Recurrent kidney stones  Percocet 5/325 mg prescribed per Dr. Benton. ROCKY reviewed and compliant. She understands addictive potential and agrees to use sparingly for extreme pain only.    5. Preventative health care  Comprehensive Metabolic Panel    Lipid Panel   6. B12 deficiency  Taking oral supplement daily. Chronic condition secondary to gastric bypass. Reassess: Vitamin B12         2. Patient Counseling:  --Nutrition: Stressed importance of moderation in sodium/caffeine intake, saturated fat and cholesterol, caloric balance, sufficient intake of fresh fruits, vegetables, fiber, calcium, iron, and 1 g folate supplementation if of childbearing age.   --Discussed the issue of calcium supplement, and the daily use of baby aspirin if applicable.  --Exercise: Stressed the importance of regular exercise.   --Substance Abuse: Discussed cessation/primary prevention of tobacco (if applicable), alcohol, or other drug use (if applicable); driving or other dangerous activities under the influence; availability of treatment for abuse.    --Sexuality: Discussed sexually transmitted diseases, partner selection, use of condoms, avoidance of unintended pregnancy  and contraceptive alternatives.   --Injury prevention: Discussed safety belts, safety helmets, smoke detector, smoking near bedding or upholstery.   --Dental health: Discussed importance of regular tooth brushing, flossing, and dental visits.  --Immunizations reviewed.  --Discussed benefits of screening colonoscopy (if applicable).  --After hours service discussed with patient.    3. Discussed the patient's BMI with her.   The BMI is in the acceptable range  4. Return in about 1 year (around 2/2/2019) for Annual physical.

## 2018-03-26 ENCOUNTER — OFFICE VISIT (OUTPATIENT)
Dept: INTERNAL MEDICINE | Facility: CLINIC | Age: 38
End: 2018-03-26

## 2018-03-26 VITALS
DIASTOLIC BLOOD PRESSURE: 60 MMHG | SYSTOLIC BLOOD PRESSURE: 110 MMHG | TEMPERATURE: 98.5 F | HEART RATE: 81 BPM | HEIGHT: 61 IN | BODY MASS INDEX: 25.49 KG/M2 | OXYGEN SATURATION: 100 % | WEIGHT: 135 LBS

## 2018-03-26 DIAGNOSIS — R11.2 NON-INTRACTABLE VOMITING WITH NAUSEA, UNSPECIFIED VOMITING TYPE: ICD-10-CM

## 2018-03-26 DIAGNOSIS — M54.2 ACUTE NECK PAIN: Primary | ICD-10-CM

## 2018-03-26 DIAGNOSIS — R20.2 NUMBNESS AND TINGLING OF RIGHT ARM: ICD-10-CM

## 2018-03-26 DIAGNOSIS — R20.0 NUMBNESS AND TINGLING OF RIGHT ARM: ICD-10-CM

## 2018-03-26 PROCEDURE — 99214 OFFICE O/P EST MOD 30 MIN: CPT | Performed by: PHYSICIAN ASSISTANT

## 2018-03-26 RX ORDER — ONDANSETRON 4 MG/1
4 TABLET, FILM COATED ORAL
COMMUNITY
Start: 2018-03-22 | End: 2019-01-29

## 2018-03-26 RX ORDER — PROMETHAZINE HYDROCHLORIDE 25 MG/1
25 TABLET ORAL EVERY 6 HOURS PRN
Qty: 20 TABLET | Refills: 0 | Status: SHIPPED | OUTPATIENT
Start: 2018-03-26 | End: 2018-06-28

## 2018-03-26 RX ORDER — CYCLOBENZAPRINE HCL 10 MG
10 TABLET ORAL 3 TIMES DAILY PRN
Qty: 60 TABLET | Refills: 0 | Status: SHIPPED | OUTPATIENT
Start: 2018-03-26 | End: 2018-06-28

## 2018-03-26 RX ORDER — HYDROCODONE BITARTRATE AND ACETAMINOPHEN 7.5; 325 MG/1; MG/1
TABLET ORAL
COMMUNITY
Start: 2018-03-22 | End: 2018-03-29

## 2018-03-26 NOTE — PROGRESS NOTES
Luz Maria Andrade is a 37 y.o. female.     Subjective   History of Present Illness   Here today with concern of neck pain which began over 1 week ago and intensified 4 days ago while straining to have a bowel movement.  She went to [facility] ED last week where she had a CT of the head which ruled out a bleed.  The pain has been worsening since onset and has become so bothersome that it causes nausea and vomiting. The right side is very sore but the headaches triggered by neck pain are in the occipital area.  When the neck pain and headache are particularly bad she sees white spots, though denies blurred vision.  Has had intermittent right arm and hand numbness and tingling with no weakness. Neck pain is sharp and spasming intermittently.  She was given Norco from the ED which helped the pain somewhat.         The following portions of the patient's history were reviewed and updated as appropriate: allergies, current medications, past family history, past medical history, past social history, past surgical history and problem list.    Review of Systems    Constitutional: Negative for appetite change, chills, fatigue, fever and unexpected weight change.   HENT: Negative for congestion, ear pain, hearing loss, nosebleeds, postnasal drip, rhinorrhea, sore throat, tinnitus and trouble swallowing.    Eyes: visual disturbance. Negative for photophobia, discharge.  Respiratory: Negative for cough, chest tightness, shortness of breath and wheezing.    Cardiovascular: Negative for chest pain, palpitations and leg swelling.   Gastrointestinal:  nausea and vomiting.  Negative for abdominal distention, abdominal pain, blood in stool, constipation, diarrhea.  Endocrine: Negative for cold intolerance, heat intolerance, polydipsia, polyphagia and polyuria.   Musculoskeletal: neck pain. Negative for arthralgias, back pain, joint swelling, myalgias and neck stiffness.   Skin: Negative for color change, pallor, rash and wound.  "  Allergic/Immunologic: Negative for environmental allergies, food allergies and immunocompromised state.   Neurological: headache, tingling and numbness right arm and hand. Negative for dizziness, tremors, seizures, weakness.  Hematological: Negative for adenopathy. Does not bruise/bleed easily.   Psychiatric/Behavioral: Negative for sleep disturbances, agitation, behavioral problems, confusion, hallucinations, self-injury and suicidal ideas. The patient is not nervous/anxious.      Objective    Physical Exam  Constitutional: Oriented to person, place, and time. Appears well-developed and well-nourished.   Head: occipital tenderness, otherwise normocephalic and atraumatic.   Eyes: EOM are normal. Pupils are equal, round, and reactive to light.   Neck: Normal range of motion. Neck supple.   Cardiovascular: Normal rate, regular rhythm and normal heart sounds.    Pulmonary/Chest: Effort normal and breath sounds normal. No respiratory distress.  Has no wheezes or rales. Exhibits no chest wall tenderness.   Abdominal: Soft. Bowel sounds are normal. Exhibits no distension and no mass. There is no tenderness.   Musculoskeletal: exquisite tenderness right sternocleidomastoid muscle. Normal range of motion.   Neurological: Alert and oriented to person, place, and time.   Skin: maculopapular lesions scattered on posterior neck from occiput to mid back bilaterally. Skin is warm and dry.   Psychiatric: Has a normal mood and affect. Behavior is normal. Judgment and thought content normal.       /60   Pulse 81   Temp 98.5 °F (36.9 °C)   Ht 154.9 cm (60.98\")   Wt 61.2 kg (135 lb)   SpO2 100%   BMI 25.52 kg/m²     Nursing note and vitals reviewed.        Assessment/Plan   Luz Maria was seen today for neck pain and follow-up.    Diagnoses and all orders for this visit:    Acute neck pain  -     cyclobenzaprine (FLEXERIL) 10 MG tablet; Take 1 tablet by mouth 3 (Three) Times a Day As Needed for Muscle Spasms.    Numbness " and tingling of right arm    Non-intractable vomiting with nausea, unspecified vomiting type  -     promethazine (PHENERGAN) 25 MG tablet; Take 1 tablet by mouth Every 6 (Six) Hours As Needed for Nausea or Vomiting.      Right sternocleidomastoid strain. Encouraged rest, light stretching and Flexeril prn.     ED record from [facility] reviewed.     F/u if pain does not begin to improve gradually.

## 2018-03-27 ENCOUNTER — TELEPHONE (OUTPATIENT)
Dept: INTERNAL MEDICINE | Facility: CLINIC | Age: 38
End: 2018-03-27

## 2018-03-27 NOTE — TELEPHONE ENCOUNTER
PATIENT SAW [facility] YESTERDAY, AND WAS TOLD IF HER PAIN WASN'T ANY BETTER, TO SEE IF Dr. ROOT CAN SEND SOMETHING IN FOR HER TO [facility]

## 2018-03-28 NOTE — TELEPHONE ENCOUNTER
Called pt and she was calling the office to see if pain medicine was going to be called in. Advised her to keep doing muscle relaxers and heat per Dr. Benton. She was also advised that pain med is not appropriate. She will continue heat and muscle relaxers for several more days. She will call and let us know if she wants PT.

## 2018-06-28 ENCOUNTER — OFFICE VISIT (OUTPATIENT)
Dept: INTERNAL MEDICINE | Facility: CLINIC | Age: 38
End: 2018-06-28

## 2018-06-28 VITALS
SYSTOLIC BLOOD PRESSURE: 112 MMHG | RESPIRATION RATE: 16 BRPM | DIASTOLIC BLOOD PRESSURE: 68 MMHG | WEIGHT: 141.25 LBS | BODY MASS INDEX: 26.7 KG/M2 | HEART RATE: 69 BPM | OXYGEN SATURATION: 99 % | TEMPERATURE: 99.3 F

## 2018-06-28 DIAGNOSIS — R10.9 RIGHT FLANK PAIN: ICD-10-CM

## 2018-06-28 DIAGNOSIS — R31.0 GROSS HEMATURIA: ICD-10-CM

## 2018-06-28 DIAGNOSIS — N20.0 RECURRENT KIDNEY STONES: Primary | ICD-10-CM

## 2018-06-28 LAB
BILIRUB BLD-MCNC: NEGATIVE MG/DL
CLARITY, POC: CLEAR
COLOR UR: YELLOW
GLUCOSE UR STRIP-MCNC: NEGATIVE MG/DL
KETONES UR QL: NEGATIVE
LEUKOCYTE EST, POC: NEGATIVE
NITRITE UR-MCNC: NEGATIVE MG/ML
PH UR: 5 [PH] (ref 5–8)
PROT UR STRIP-MCNC: NEGATIVE MG/DL
RBC # UR STRIP: NEGATIVE /UL
SP GR UR: 1.02 (ref 1–1.03)
UROBILINOGEN UR QL: NORMAL

## 2018-06-28 PROCEDURE — 81003 URINALYSIS AUTO W/O SCOPE: CPT | Performed by: PHYSICIAN ASSISTANT

## 2018-06-28 PROCEDURE — 99213 OFFICE O/P EST LOW 20 MIN: CPT | Performed by: PHYSICIAN ASSISTANT

## 2018-06-28 RX ORDER — OXYCODONE HYDROCHLORIDE AND ACETAMINOPHEN 5; 325 MG/1; MG/1
1 TABLET ORAL EVERY 4 HOURS PRN
Qty: 20 TABLET | Refills: 0 | Status: SHIPPED | OUTPATIENT
Start: 2018-06-28 | End: 2019-01-29

## 2018-06-28 RX ORDER — TAMSULOSIN HYDROCHLORIDE 0.4 MG/1
1 CAPSULE ORAL DAILY
Qty: 30 CAPSULE | Refills: 11 | Status: SHIPPED | OUTPATIENT
Start: 2018-06-28 | End: 2022-01-10

## 2018-06-28 RX ORDER — PROMETHAZINE HYDROCHLORIDE 12.5 MG/1
12.5 TABLET ORAL EVERY 6 HOURS PRN
Qty: 20 TABLET | Refills: 0 | Status: SHIPPED | OUTPATIENT
Start: 2018-06-28 | End: 2019-01-29

## 2018-06-28 NOTE — PROGRESS NOTES
Luz Maria Andrade is a 37 y.o. female.     Subjective   History of Present Illness   Here today with concern of right low back pain which has been bothersome since Sunday. Passes kidney stones very frequently but this one is particularly bothersome. She is leaving for vacation tomorrow and worried that Tylenol will not help her during her trip. Se has noticed hematuria for the last few days intermittently. She denies fever. Chills or nausea. She has had some dysuria, frequency and urinary hesitancy.         The following portions of the patient's history were reviewed and updated as appropriate: allergies, current medications, past family history, past medical history, past social history, past surgical history and problem list.    Review of Systems    Constitutional: Negative for appetite change, chills, fatigue, fever and unexpected weight change.   HENT: Negative for congestion, ear pain, hearing loss, nosebleeds, postnasal drip, rhinorrhea, sore throat, tinnitus and trouble swallowing.    Eyes: Negative for photophobia, discharge and visual disturbance.   Respiratory: Negative for cough, chest tightness, shortness of breath and wheezing.    Cardiovascular: Negative for chest pain, palpitations and leg swelling.   Gastrointestinal: Negative for abdominal distention, abdominal pain, blood in stool, constipation, diarrhea, nausea and vomiting.   Endocrine: Negative for cold intolerance, heat intolerance, polydipsia, polyphagia and polyuria.   Musculoskeletal: back/flank pain.  Negative for arthralgias, joint swelling, myalgias, neck pain and neck stiffness.   Skin: Negative for color change, pallor, rash and wound.   Allergic/Immunologic: Negative for environmental allergies, food allergies and immunocompromised state.   Neurological: Negative for dizziness, tremors, seizures, weakness, numbness and headaches.   Hematological: Negative for adenopathy. Does not bruise/bleed easily.   Psychiatric/Behavioral: Negative  for sleep disturbances, agitation, behavioral problems, confusion, hallucinations, self-injury and suicidal ideas. The patient is not nervous/anxious.    : hematuria, dysuria, hesitancy, frequency.     Objective    Physical Exam  Constitutional: No acute distress. Appears well-developed and well-nourished.   Head: Normocephalic and atraumatic.   Eyes: EOM are normal. Pupils are equal, round, and reactive to light.   Neck: Normal range of motion. Neck supple.   Cardiovascular: Normal rate, regular rhythm and normal heart sounds.    Pulmonary/Chest: Effort normal and breath sounds normal. No respiratory distress.  Has no wheezes or rales. Exhibits no chest wall tenderness.   Abdominal: right abdominal tenderness. Soft. Bowel sounds are normal. Exhibits no distension and no mass.   Musculoskeletal: right CVA tenderness. Normal range of motion.   Neurological: Alert and oriented to person, place, and time.   Skin: Skin is warm and dry.   Psychiatric: Has a normal mood and affect. Behavior is normal. Judgment and thought content normal.       /68   Pulse 69   Temp 99.3 °F (37.4 °C) (Oral)   Resp 16   Wt 64.1 kg (141 lb 4 oz)   SpO2 99%   BMI 26.70 kg/m²     Nursing note and vitals reviewed.          Assessment/Plan   Luz Maria was seen today for nephrolithiasis and back pain.    Diagnoses and all orders for this visit:    Recurrent kidney stones  -     POC Urinalysis Dipstick, Automated    Gross hematuria  -     POC Urinalysis Dipstick, Automated    Right flank pain  -     POC Urinalysis Dipstick, Automated      Brief Urine Lab Results  (Last result in the past 365 days)      Color   Clarity   Blood   Leuk Est   Nitrite   Protein   CREAT   Urine HCG        06/28/18 1459 Yellow Clear Negative Negative Negative Negative             Encouraged her to increase fluid intake and resume use of Flomax.     ROCKY reviewed and compliant. Request for pain medication routed to Dr. Benton and pending approval.

## 2018-06-28 NOTE — TELEPHONE ENCOUNTER
Per Pau, patient needs rx oxycodone/acetaminophen 325mg/5mg #10 or 20, patient in a lot of pain due to kidney stone. Saw Pau today.

## 2018-07-04 LAB
BACTERIA UR CULT: ABNORMAL
OTHER ANTIBIOTIC SUSC ISLT: ABNORMAL

## 2018-07-06 RX ORDER — NITROFURANTOIN 25; 75 MG/1; MG/1
100 CAPSULE ORAL 2 TIMES DAILY
Qty: 14 CAPSULE | Refills: 0 | Status: SHIPPED | OUTPATIENT
Start: 2018-07-06 | End: 2018-07-13

## 2018-12-27 RX ORDER — OSELTAMIVIR PHOSPHATE 75 MG/1
75 CAPSULE ORAL DAILY
Qty: 10 CAPSULE | Refills: 0 | Status: SHIPPED | OUTPATIENT
Start: 2018-12-27 | End: 2019-01-29

## 2019-01-29 ENCOUNTER — TELEPHONE (OUTPATIENT)
Dept: INTERNAL MEDICINE | Facility: CLINIC | Age: 39
End: 2019-01-29

## 2019-01-29 ENCOUNTER — OFFICE VISIT (OUTPATIENT)
Dept: INTERNAL MEDICINE | Facility: CLINIC | Age: 39
End: 2019-01-29

## 2019-01-29 VITALS
SYSTOLIC BLOOD PRESSURE: 116 MMHG | TEMPERATURE: 98 F | HEIGHT: 60 IN | HEART RATE: 77 BPM | OXYGEN SATURATION: 99 % | WEIGHT: 156 LBS | DIASTOLIC BLOOD PRESSURE: 70 MMHG | BODY MASS INDEX: 30.63 KG/M2

## 2019-01-29 DIAGNOSIS — N20.0 RECURRENT KIDNEY STONES: Primary | ICD-10-CM

## 2019-01-29 DIAGNOSIS — R10.9 FLANK PAIN: ICD-10-CM

## 2019-01-29 PROCEDURE — 99213 OFFICE O/P EST LOW 20 MIN: CPT | Performed by: FAMILY MEDICINE

## 2019-01-29 PROCEDURE — 81003 URINALYSIS AUTO W/O SCOPE: CPT | Performed by: FAMILY MEDICINE

## 2019-01-29 RX ORDER — ONDANSETRON 8 MG/1
8 TABLET, ORALLY DISINTEGRATING ORAL EVERY 8 HOURS PRN
Qty: 20 TABLET | Refills: 0 | Status: SHIPPED | OUTPATIENT
Start: 2019-01-29 | End: 2022-05-04 | Stop reason: HOSPADM

## 2019-01-29 RX ORDER — OXYCODONE HYDROCHLORIDE AND ACETAMINOPHEN 5; 325 MG/1; MG/1
1 TABLET ORAL EVERY 6 HOURS PRN
Qty: 12 TABLET | Refills: 0 | Status: SHIPPED | OUTPATIENT
Start: 2019-01-29 | End: 2021-12-03

## 2019-01-29 NOTE — TELEPHONE ENCOUNTER
Patient has a kidney stone, she went on and scheduled appt for 1130, but wanted to see if something could be called in for pain without being seen.   Pt has plenty of flomax but doesn't have any pain [place]   Phone # [phone]

## 2019-01-29 NOTE — PROGRESS NOTES
Luz Maria Andrade is a 38 y.o. female.    Chief Complaint   Patient presents with   • Flank Pain     rt side   • Nausea       HPI   Patient reports right sided kidney stones.  She has passed many of these stones in the past.  Pain is sharp and located in her right flank.  Denies any noticeable hematuria.  She is taking flomax right now. She reports the pain is unbearable and has a significant amount of nausea.  She does report bouts of emesis as well.     The following portions of the patient's history were reviewed and updated as appropriate: allergies, current medications, past family history, past medical history, past social history, past surgical history and problem list.     No Known Allergies      Current Outpatient Medications:   •  Cyanocobalamin (VITAMIN B 12) 100 MCG lozenge, Take  by mouth., Disp: , Rfl:   •  tamsulosin (FLOMAX) 0.4 MG capsule 24 hr capsule, Take 1 capsule by mouth Daily., Disp: 30 capsule, Rfl: 11  •  ondansetron ODT (ZOFRAN ODT) 8 MG disintegrating tablet, Take 1 tablet by mouth Every 8 (Eight) Hours As Needed for Nausea or Vomiting., Disp: 20 tablet, Rfl: 0  •  oxyCODONE-acetaminophen (PERCOCET) 5-325 MG per tablet, Take 1 tablet by mouth Every 6 (Six) Hours As Needed for Moderate Pain  or Severe Pain ., Disp: 12 tablet, Rfl: 0    ROS    Review of Systems   Constitutional: Negative for chills, fatigue and fever.   HENT: Negative for congestion, postnasal drip and sore throat.    Eyes: Negative for pain and itching.   Respiratory: Negative for cough, shortness of breath and wheezing.    Cardiovascular: Negative for chest pain and leg swelling.   Gastrointestinal: Positive for nausea and vomiting. Negative for abdominal pain, constipation and diarrhea.   Genitourinary: Positive for flank pain. Negative for dysuria and hematuria.   Musculoskeletal: Negative for arthralgias and back pain.   Skin: Negative for color change and rash.       Vitals:    01/29/19 1134   BP: 116/70   BP Location:  "Left arm   Patient Position: Sitting   Cuff Size: Adult   Pulse: 77   Temp: 98 °F (36.7 °C)   TempSrc: Oral   SpO2: 99%   Weight: 70.8 kg (156 lb)   Height: 152.4 cm (60\")     Body mass index is 30.47 kg/m².    Physical Exam     Physical Exam   Constitutional: She is oriented to person, place, and time. She appears well-developed and well-nourished. No distress.   HENT:   Head: Normocephalic and atraumatic.   Right Ear: External ear normal.   Left Ear: External ear normal.   Eyes: Conjunctivae and EOM are normal.   Cardiovascular: Normal rate and regular rhythm.   No murmur heard.  Pulmonary/Chest: Effort normal and breath sounds normal. No respiratory distress. She has no wheezes.   Abdominal: Soft. Bowel sounds are normal. She exhibits no distension. There is no tenderness. There is CVA tenderness.   Neurological: She is alert and oriented to person, place, and time. No cranial nerve deficit.   Skin: Skin is warm and dry.   Psychiatric: She has a normal mood and affect. Her behavior is normal.       Assessment/Plan    Problem List Items Addressed This Visit        Genitourinary    Recurrent kidney stones - Primary     Patient is to continue flomax.  Will treat with percocet for pain as she is unable to tolerate NSAIDs due to h/o gastric bypass surgery.  She may take zofran prn for nausea.  Advised to have KUB done to assess the size of the stone to ensure passage.          Relevant Medications    oxyCODONE-acetaminophen (PERCOCET) 5-325 MG per tablet      Other Visit Diagnoses     Flank pain        Relevant Orders    POC Urinalysis Dipstick, Automated (Completed)    XR Abdomen KUB          New Medications Ordered This Visit   Medications   • oxyCODONE-acetaminophen (PERCOCET) 5-325 MG per tablet     Sig: Take 1 tablet by mouth Every 6 (Six) Hours As Needed for Moderate Pain  or Severe Pain .     Dispense:  12 tablet     Refill:  0   • ondansetron ODT (ZOFRAN ODT) 8 MG disintegrating tablet     Sig: Take 1 tablet " by mouth Every 8 (Eight) Hours As Needed for Nausea or Vomiting.     Dispense:  20 tablet     Refill:  0       No orders of the defined types were placed in this encounter.      Return if symptoms worsen or fail to improve.    Lazara Cordova, DO

## 2019-01-29 NOTE — ASSESSMENT & PLAN NOTE
Patient is to continue flomax.  Will treat with percocet for pain as she is unable to tolerate NSAIDs due to h/o gastric bypass surgery.  She may take zofran prn for nausea.  Advised to have KUB done to assess the size of the stone to ensure passage.

## 2019-02-05 ENCOUNTER — TELEPHONE (OUTPATIENT)
Dept: INTERNAL MEDICINE | Facility: CLINIC | Age: 39
End: 2019-02-05

## 2019-02-05 NOTE — TELEPHONE ENCOUNTER
Patient called back about this. She states she would like to see if she can get an antibiotic sent into [facility].

## 2019-02-05 NOTE — TELEPHONE ENCOUNTER
Pt states that she saw Dr. Cordova last week for a kidney stone and was advised to call [facility] kif the pain didn't get better. Pt states that she is still experiencing pain and believes that she may have an infection. Please advise.

## 2019-02-06 NOTE — TELEPHONE ENCOUNTER
Spoke with patient and she stated that she doesn't have any insurance and refused an appointment.

## 2019-02-06 NOTE — TELEPHONE ENCOUNTER
Pt left VM at 12:11pm stating that she had just missed a call from nurse and she would like another call back when possible.

## 2019-02-06 NOTE — TELEPHONE ENCOUNTER
If she is still having pain from her kidney stone, she needs to touch base with her urologist.  It may be too large to pass.  She would need to be seen to receive antibiotics for a uti.  Urine needs to be checked.  Thanks.

## 2019-02-06 NOTE — TELEPHONE ENCOUNTER
Patient is still having pain from kidney stones and is wanting if there is anything for pain besides tylenol also she stated that it is burning when urinating and she thinks its a uti can she have an antibiotic.

## 2019-05-31 ENCOUNTER — HOSPITAL ENCOUNTER (OUTPATIENT)
Facility: HOSPITAL | Age: 39
Discharge: HOME | End: 2019-05-31

## 2019-05-31 LAB
A/G RATIO: 1.4 (ref 0.8–2)
ALBUMIN SERPL-MCNC: 4.3 G/DL (ref 3.4–4.8)
ALP BLD-CCNC: 81 U/L (ref 25–100)
ALT SERPL-CCNC: 7 U/L (ref 4–36)
ANION GAP SERPL CALCULATED.3IONS-SCNC: 15 MMOL/L (ref 3–16)
AST SERPL-CCNC: 15 U/L (ref 8–33)
BASOPHILS ABSOLUTE: 0 K/UL (ref 0–0.1)
BASOPHILS RELATIVE PERCENT: 0.6 %
BILIRUB SERPL-MCNC: 0.3 MG/DL (ref 0.3–1.2)
BUN BLDV-MCNC: 8 MG/DL (ref 6–20)
CALCIUM SERPL-MCNC: 9.2 MG/DL (ref 8.5–10.5)
CHLORIDE BLD-SCNC: 105 MMOL/L (ref 98–107)
CHOLESTEROL, TOTAL: 181 MG/DL (ref 0–200)
CO2: 22 MMOL/L (ref 20–30)
CREAT SERPL-MCNC: 0.6 MG/DL (ref 0.4–1.2)
EOSINOPHILS ABSOLUTE: 0.1 K/UL (ref 0–0.4)
EOSINOPHILS RELATIVE PERCENT: 1.7 %
GFR AFRICAN AMERICAN: >59
GFR NON-AFRICAN AMERICAN: >60
GLOBULIN: 3.1 G/DL
GLUCOSE BLD-MCNC: 83 MG/DL (ref 74–106)
HBA1C MFR BLD: 5.5 %
HCT VFR BLD CALC: 33.5 % (ref 37–47)
HDLC SERPL-MCNC: 52 MG/DL (ref 40–60)
HEMOGLOBIN: 9.5 G/DL (ref 11.5–16.5)
IMMATURE GRANULOCYTES #: 0 K/UL
IMMATURE GRANULOCYTES %: 0.4 % (ref 0–5)
LDL CHOLESTEROL CALCULATED: 114 MG/DL
LYMPHOCYTES ABSOLUTE: 0.7 K/UL (ref 1.5–4)
LYMPHOCYTES RELATIVE PERCENT: 12.2 %
MCH RBC QN AUTO: 21.4 PG (ref 27–32)
MCHC RBC AUTO-ENTMCNC: 28.4 G/DL (ref 31–35)
MCV RBC AUTO: 75.5 FL (ref 80–100)
MONOCYTES ABSOLUTE: 0.3 K/UL (ref 0.2–0.8)
MONOCYTES RELATIVE PERCENT: 5.5 %
NEUTROPHILS ABSOLUTE: 4.3 K/UL (ref 2–7.5)
NEUTROPHILS RELATIVE PERCENT: 79.6 %
PDW BLD-RTO: 16.1 % (ref 11–16)
PLATELET # BLD: 393 K/UL (ref 150–400)
PMV BLD AUTO: 9.4 FL (ref 6–10)
POTASSIUM SERPL-SCNC: 4 MMOL/L (ref 3.4–5.1)
RBC # BLD: 4.44 M/UL (ref 3.8–5.8)
SODIUM BLD-SCNC: 142 MMOL/L (ref 136–145)
TOTAL PROTEIN: 7.4 G/DL (ref 6.4–8.3)
TRIGL SERPL-MCNC: 77 MG/DL (ref 0–249)
TSH SERPL DL<=0.05 MIU/L-ACNC: 1.62 UIU/ML (ref 0.35–5.5)
VITAMIN B-12: 287 PG/ML (ref 211–911)
VITAMIN D 25-HYDROXY: 35.9 (ref 32–100)
VLDLC SERPL CALC-MCNC: 15 MG/DL
WBC # BLD: 5.4 K/UL (ref 4–11)

## 2019-05-31 PROCEDURE — 83036 HEMOGLOBIN GLYCOSYLATED A1C: CPT

## 2019-05-31 PROCEDURE — 84443 ASSAY THYROID STIM HORMONE: CPT

## 2019-05-31 PROCEDURE — 80061 LIPID PANEL: CPT

## 2019-05-31 PROCEDURE — 36415 COLL VENOUS BLD VENIPUNCTURE: CPT

## 2019-05-31 PROCEDURE — 80053 COMPREHEN METABOLIC PANEL: CPT

## 2019-05-31 PROCEDURE — 82306 VITAMIN D 25 HYDROXY: CPT

## 2019-05-31 PROCEDURE — 85025 COMPLETE CBC W/AUTO DIFF WBC: CPT

## 2019-05-31 PROCEDURE — 82607 VITAMIN B-12: CPT

## 2020-02-23 ENCOUNTER — OFFICE VISIT (OUTPATIENT)
Dept: PRIMARY CARE CLINIC | Age: 40
End: 2020-02-23
Payer: COMMERCIAL

## 2020-02-23 VITALS
SYSTOLIC BLOOD PRESSURE: 117 MMHG | DIASTOLIC BLOOD PRESSURE: 80 MMHG | OXYGEN SATURATION: 99 % | BODY MASS INDEX: 31.91 KG/M2 | WEIGHT: 169 LBS | HEART RATE: 80 BPM | HEIGHT: 61 IN | TEMPERATURE: 97 F

## 2020-02-23 LAB
INFLUENZA A ANTIBODY: NORMAL
INFLUENZA B ANTIBODY: NORMAL

## 2020-02-23 PROCEDURE — 87804 INFLUENZA ASSAY W/OPTIC: CPT | Performed by: NURSE PRACTITIONER

## 2020-02-23 PROCEDURE — 99213 OFFICE O/P EST LOW 20 MIN: CPT | Performed by: NURSE PRACTITIONER

## 2020-02-23 RX ORDER — GUAIFENESIN 600 MG/1
1200 TABLET, EXTENDED RELEASE ORAL 2 TIMES DAILY PRN
COMMUNITY
Start: 2020-02-23 | End: 2021-01-22

## 2020-02-23 RX ORDER — FLUCONAZOLE 150 MG/1
150 TABLET ORAL ONCE
Qty: 2 TABLET | Refills: 0 | Status: SHIPPED | OUTPATIENT
Start: 2020-02-23 | End: 2020-02-23

## 2020-02-23 RX ORDER — AMOXICILLIN 500 MG/1
500 CAPSULE ORAL 2 TIMES DAILY
Qty: 20 CAPSULE | Refills: 0 | Status: SHIPPED | OUTPATIENT
Start: 2020-02-23 | End: 2020-03-04

## 2020-02-23 RX ORDER — FLUTICASONE PROPIONATE 50 MCG
2 SPRAY, SUSPENSION (ML) NASAL DAILY
Qty: 1 BOTTLE | Refills: 0 | Status: SHIPPED | OUTPATIENT
Start: 2020-02-23 | End: 2021-01-22

## 2020-02-23 RX ORDER — PROMETHAZINE HYDROCHLORIDE 6.25 MG/5ML
6.25 SYRUP ORAL NIGHTLY PRN
Qty: 35 ML | Refills: 0 | Status: SHIPPED | OUTPATIENT
Start: 2020-02-23 | End: 2020-03-01

## 2020-02-23 ASSESSMENT — ENCOUNTER SYMPTOMS
COUGH: 1
NAUSEA: 1

## 2020-02-23 NOTE — PROGRESS NOTES
Subjective:      Patient ID: Luisa Dowling is a 46 y.o. female. HPI c/o fever (TMAX 103), malaise, and right ear ache. She has developed productive cough with thick green sputum. She has some mild nausea and drainage, congestion, and HA. She has taken tylenol with some relief of symptoms. Long hx of ear trouble, states she has had 22 procedures on her ears. Has ENT referral.     Review of Systems   Constitutional: Positive for fatigue and fever. HENT: Positive for ear pain and postnasal drip. Respiratory: Positive for cough. Gastrointestinal: Positive for nausea. Neurological: Positive for headaches. Objective:   Physical Exam  Vitals signs and nursing note reviewed. Constitutional:       Appearance: Normal appearance. HENT:      Head: Normocephalic and atraumatic. Right Ear: Ear canal and external ear normal. Tenderness present. A middle ear effusion is present. Tympanic membrane is scarred. Left Ear: Ear canal and external ear normal. A middle ear effusion is present. Tympanic membrane is scarred. Nose: Mucosal edema and congestion present. Mouth/Throat:      Mouth: Mucous membranes are moist.      Pharynx: Posterior oropharyngeal erythema present. Eyes:      General:         Right eye: No discharge. Left eye: No discharge. Neck:      Musculoskeletal: Normal range of motion and neck supple. Cardiovascular:      Rate and Rhythm: Normal rate and regular rhythm. Heart sounds: Normal heart sounds. Musculoskeletal: Normal range of motion. Lymphadenopathy:      Cervical: Cervical adenopathy present. Skin:     General: Skin is warm and dry. Capillary Refill: Capillary refill takes less than 2 seconds. Neurological:      Mental Status: She is alert and oriented to person, place, and time. Psychiatric:         Mood and Affect: Mood normal.         Behavior: Behavior normal.         Thought Content:  Thought content normal.         Judgment: Judgment normal.         Assessment:      Bilateral mid ear effusion, sinusitis      Plan:      Start and finish amoxil, mucinex with increased fluids, start flonase daily, phenergan syrup qhs prn to help with nausea, congestion, sleep. Has appt 3 weeks with ENT. Diflucan after abx therapy as she is prone to jamie.          Darryl Thorpe, APRN - NP

## 2020-04-20 ENCOUNTER — HOSPITAL ENCOUNTER (OUTPATIENT)
Facility: HOSPITAL | Age: 40
Discharge: HOME | End: 2020-04-20
Payer: COMMERCIAL

## 2020-04-20 LAB
A/G RATIO: 1.8 (ref 0.8–2)
ALBUMIN SERPL-MCNC: 4.2 G/DL (ref 3.4–4.8)
ALP BLD-CCNC: 71 U/L (ref 25–100)
ALT SERPL-CCNC: 9 U/L (ref 4–36)
ANION GAP SERPL CALCULATED.3IONS-SCNC: 12 MMOL/L (ref 3–16)
AST SERPL-CCNC: 17 U/L (ref 8–33)
BASOPHILS ABSOLUTE: 0 K/UL (ref 0–0.1)
BASOPHILS RELATIVE PERCENT: 1 %
BILIRUB SERPL-MCNC: <0.2 MG/DL (ref 0.3–1.2)
BUN BLDV-MCNC: 8 MG/DL (ref 6–20)
CALCIUM SERPL-MCNC: 8.8 MG/DL (ref 8.5–10.5)
CHLORIDE BLD-SCNC: 104 MMOL/L (ref 98–107)
CHOLESTEROL, TOTAL: 148 MG/DL (ref 0–200)
CO2: 24 MMOL/L (ref 20–30)
CREAT SERPL-MCNC: 0.6 MG/DL (ref 0.4–1.2)
EOSINOPHILS ABSOLUTE: 0.1 K/UL (ref 0–0.4)
EOSINOPHILS RELATIVE PERCENT: 2.3 %
GFR AFRICAN AMERICAN: >59
GFR NON-AFRICAN AMERICAN: >60
GLOBULIN: 2.4 G/DL
GLUCOSE BLD-MCNC: 85 MG/DL (ref 74–106)
HBA1C MFR BLD: 5.4 %
HCT VFR BLD CALC: 30.7 % (ref 37–47)
HDLC SERPL-MCNC: 55 MG/DL (ref 40–60)
HEMOGLOBIN: 8.3 G/DL (ref 11.5–16.5)
IMMATURE GRANULOCYTES #: 0 K/UL
IMMATURE GRANULOCYTES %: 0.3 % (ref 0–5)
LDL CHOLESTEROL CALCULATED: 80 MG/DL
LYMPHOCYTES ABSOLUTE: 1.1 K/UL (ref 1.5–4)
LYMPHOCYTES RELATIVE PERCENT: 36.5 %
MCH RBC QN AUTO: 19 PG (ref 27–32)
MCHC RBC AUTO-ENTMCNC: 27 G/DL (ref 31–35)
MCV RBC AUTO: 70.4 FL (ref 80–100)
MONOCYTES ABSOLUTE: 0.2 K/UL (ref 0.2–0.8)
MONOCYTES RELATIVE PERCENT: 6.9 %
NEUTROPHILS ABSOLUTE: 1.6 K/UL (ref 2–7.5)
NEUTROPHILS RELATIVE PERCENT: 53 %
PDW BLD-RTO: 19.3 % (ref 11–16)
PLATELET # BLD: 297 K/UL (ref 150–400)
PMV BLD AUTO: 10.3 FL (ref 6–10)
POTASSIUM SERPL-SCNC: 3.9 MMOL/L (ref 3.4–5.1)
RBC # BLD: 4.36 M/UL (ref 3.8–5.8)
SODIUM BLD-SCNC: 140 MMOL/L (ref 136–145)
TOTAL PROTEIN: 6.6 G/DL (ref 6.4–8.3)
TRIGL SERPL-MCNC: 63 MG/DL (ref 0–249)
TSH SERPL DL<=0.05 MIU/L-ACNC: 1.4 UIU/ML (ref 0.35–5.5)
VITAMIN B-12: 269 PG/ML (ref 211–911)
VITAMIN D 25-HYDROXY: 26.4 (ref 32–100)
VLDLC SERPL CALC-MCNC: 13 MG/DL
WBC # BLD: 3 K/UL (ref 4–11)

## 2020-04-20 PROCEDURE — 85025 COMPLETE CBC W/AUTO DIFF WBC: CPT

## 2020-04-20 PROCEDURE — 83036 HEMOGLOBIN GLYCOSYLATED A1C: CPT

## 2020-04-20 PROCEDURE — 84443 ASSAY THYROID STIM HORMONE: CPT

## 2020-04-20 PROCEDURE — 82306 VITAMIN D 25 HYDROXY: CPT

## 2020-04-20 PROCEDURE — 82607 VITAMIN B-12: CPT

## 2020-04-20 PROCEDURE — 36415 COLL VENOUS BLD VENIPUNCTURE: CPT

## 2020-04-20 PROCEDURE — 80061 LIPID PANEL: CPT

## 2020-04-20 PROCEDURE — 80053 COMPREHEN METABOLIC PANEL: CPT

## 2021-01-22 ENCOUNTER — APPOINTMENT (OUTPATIENT)
Dept: CT IMAGING | Facility: HOSPITAL | Age: 41
End: 2021-01-22
Payer: COMMERCIAL

## 2021-01-22 ENCOUNTER — HOSPITAL ENCOUNTER (EMERGENCY)
Facility: HOSPITAL | Age: 41
Discharge: HOME | End: 2021-01-22
Attending: EMERGENCY MEDICINE
Payer: COMMERCIAL

## 2021-01-22 VITALS
SYSTOLIC BLOOD PRESSURE: 108 MMHG | OXYGEN SATURATION: 100 % | TEMPERATURE: 98.5 F | DIASTOLIC BLOOD PRESSURE: 72 MMHG | WEIGHT: 177 LBS | HEART RATE: 82 BPM | HEIGHT: 61 IN | BODY MASS INDEX: 33.42 KG/M2 | RESPIRATION RATE: 18 BRPM

## 2021-01-22 DIAGNOSIS — G44.89 OTHER HEADACHE SYNDROME: ICD-10-CM

## 2021-01-22 DIAGNOSIS — D64.9 ANEMIA, UNSPECIFIED TYPE: ICD-10-CM

## 2021-01-22 DIAGNOSIS — D21.9 LEIOMYOMA: ICD-10-CM

## 2021-01-22 DIAGNOSIS — N30.00 ACUTE CYSTITIS WITHOUT HEMATURIA: ICD-10-CM

## 2021-01-22 DIAGNOSIS — R11.2 NON-INTRACTABLE VOMITING WITH NAUSEA, UNSPECIFIED VOMITING TYPE: Primary | ICD-10-CM

## 2021-01-22 DIAGNOSIS — R91.1 PULMONARY NODULE: ICD-10-CM

## 2021-01-22 LAB
A/G RATIO: 1.3 (ref 0.8–2)
ALBUMIN SERPL-MCNC: 4.3 G/DL (ref 3.4–4.8)
ALP BLD-CCNC: 90 U/L (ref 25–100)
ALT SERPL-CCNC: 7 U/L (ref 4–36)
AMORPHOUS: ABNORMAL /HPF
ANION GAP SERPL CALCULATED.3IONS-SCNC: 10 MMOL/L (ref 3–16)
AST SERPL-CCNC: 13 U/L (ref 8–33)
BACTERIA: ABNORMAL /HPF
BASOPHILS ABSOLUTE: 0 K/UL (ref 0–0.1)
BASOPHILS RELATIVE PERCENT: 0.7 %
BILIRUB SERPL-MCNC: <0.2 MG/DL (ref 0.3–1.2)
BILIRUBIN URINE: ABNORMAL
BLOOD, URINE: NEGATIVE
BUN BLDV-MCNC: 10 MG/DL (ref 6–20)
CALCIUM SERPL-MCNC: 9 MG/DL (ref 8.5–10.5)
CHLORIDE BLD-SCNC: 107 MMOL/L (ref 98–107)
CLARITY: ABNORMAL
CO2: 20 MMOL/L (ref 20–30)
COLOR: YELLOW
CREAT SERPL-MCNC: 0.7 MG/DL (ref 0.4–1.2)
EOSINOPHILS ABSOLUTE: 0.1 K/UL (ref 0–0.4)
EOSINOPHILS RELATIVE PERCENT: 1.8 %
EPITHELIAL CELLS, UA: ABNORMAL /HPF (ref 0–5)
GFR AFRICAN AMERICAN: >59
GFR NON-AFRICAN AMERICAN: >60
GLOBULIN: 3.3 G/DL
GLUCOSE BLD-MCNC: 94 MG/DL (ref 74–106)
GLUCOSE URINE: NEGATIVE MG/DL
HCG(URINE) PREGNANCY TEST: NEGATIVE
HCT VFR BLD CALC: 30.3 % (ref 37–47)
HEMOGLOBIN: 8.2 G/DL (ref 11.5–16.5)
IMMATURE GRANULOCYTES #: 0 K/UL
IMMATURE GRANULOCYTES %: 0.4 % (ref 0–5)
KETONES, URINE: NEGATIVE MG/DL
LEUKOCYTE ESTERASE, URINE: NEGATIVE
LIPASE: 17 U/L (ref 5.6–51.3)
LYMPHOCYTES ABSOLUTE: 1 K/UL (ref 1.5–4)
LYMPHOCYTES RELATIVE PERCENT: 22 %
MCH RBC QN AUTO: 18.1 PG (ref 27–32)
MCHC RBC AUTO-ENTMCNC: 27.1 G/DL (ref 31–35)
MCV RBC AUTO: 66.7 FL (ref 80–100)
MICROSCOPIC EXAMINATION: YES
MONOCYTES ABSOLUTE: 0.3 K/UL (ref 0.2–0.8)
MONOCYTES RELATIVE PERCENT: 7.3 %
NEUTROPHILS ABSOLUTE: 3.1 K/UL (ref 2–7.5)
NEUTROPHILS RELATIVE PERCENT: 67.8 %
NITRITE, URINE: NEGATIVE
PDW BLD-RTO: 20.1 % (ref 11–16)
PH UA: 5.5 (ref 5–8)
PLATELET # BLD: 361 K/UL (ref 150–400)
PMV BLD AUTO: 9.3 FL (ref 6–10)
POTASSIUM REFLEX MAGNESIUM: 3.6 MMOL/L (ref 3.4–5.1)
PROTEIN UA: NEGATIVE MG/DL
RBC # BLD: 4.54 M/UL (ref 3.8–5.8)
RBC UA: ABNORMAL /HPF (ref 0–4)
SODIUM BLD-SCNC: 137 MMOL/L (ref 136–145)
SPECIFIC GRAVITY UA: >=1.03 (ref 1–1.03)
TOTAL PROTEIN: 7.6 G/DL (ref 6.4–8.3)
URINE REFLEX TO CULTURE: YES
URINE TYPE: ABNORMAL
UROBILINOGEN, URINE: 1 E.U./DL
WBC # BLD: 4.5 K/UL (ref 4–11)
WBC UA: ABNORMAL /HPF (ref 0–5)

## 2021-01-22 PROCEDURE — 96375 TX/PRO/DX INJ NEW DRUG ADDON: CPT

## 2021-01-22 PROCEDURE — 74177 CT ABD & PELVIS W/CONTRAST: CPT

## 2021-01-22 PROCEDURE — 84703 CHORIONIC GONADOTROPIN ASSAY: CPT

## 2021-01-22 PROCEDURE — 6360000002 HC RX W HCPCS: Performed by: EMERGENCY MEDICINE

## 2021-01-22 PROCEDURE — 83690 ASSAY OF LIPASE: CPT

## 2021-01-22 PROCEDURE — 2580000003 HC RX 258: Performed by: EMERGENCY MEDICINE

## 2021-01-22 PROCEDURE — 81001 URINALYSIS AUTO W/SCOPE: CPT

## 2021-01-22 PROCEDURE — 87086 URINE CULTURE/COLONY COUNT: CPT

## 2021-01-22 PROCEDURE — 85025 COMPLETE CBC W/AUTO DIFF WBC: CPT

## 2021-01-22 PROCEDURE — 96374 THER/PROPH/DIAG INJ IV PUSH: CPT

## 2021-01-22 PROCEDURE — 80053 COMPREHEN METABOLIC PANEL: CPT

## 2021-01-22 PROCEDURE — 6360000004 HC RX CONTRAST MEDICATION: Performed by: EMERGENCY MEDICINE

## 2021-01-22 PROCEDURE — 99282 EMERGENCY DEPT VISIT SF MDM: CPT

## 2021-01-22 RX ORDER — SODIUM CHLORIDE, SODIUM LACTATE, POTASSIUM CHLORIDE, CALCIUM CHLORIDE 600; 310; 30; 20 MG/100ML; MG/100ML; MG/100ML; MG/100ML
1000 INJECTION, SOLUTION INTRAVENOUS ONCE
Status: COMPLETED | OUTPATIENT
Start: 2021-01-22 | End: 2021-01-22

## 2021-01-22 RX ORDER — SULFAMETHOXAZOLE AND TRIMETHOPRIM 800; 160 MG/1; MG/1
1 TABLET ORAL 2 TIMES DAILY
Qty: 6 TABLET | Refills: 0 | Status: SHIPPED | OUTPATIENT
Start: 2021-01-22 | End: 2021-01-25

## 2021-01-22 RX ORDER — PROMETHAZINE HYDROCHLORIDE 25 MG/1
25 TABLET ORAL EVERY 8 HOURS PRN
COMMUNITY
End: 2021-03-22

## 2021-01-22 RX ORDER — METOCLOPRAMIDE HYDROCHLORIDE 5 MG/ML
10 INJECTION INTRAMUSCULAR; INTRAVENOUS ONCE
Status: COMPLETED | OUTPATIENT
Start: 2021-01-22 | End: 2021-01-22

## 2021-01-22 RX ORDER — PROMETHAZINE HYDROCHLORIDE 25 MG/1
25 TABLET ORAL 4 TIMES DAILY PRN
Qty: 20 TABLET | Refills: 0 | Status: SHIPPED | OUTPATIENT
Start: 2021-01-22 | End: 2021-01-29

## 2021-01-22 RX ORDER — DIPHENHYDRAMINE HYDROCHLORIDE 50 MG/ML
25 INJECTION INTRAMUSCULAR; INTRAVENOUS ONCE
Status: COMPLETED | OUTPATIENT
Start: 2021-01-22 | End: 2021-01-22

## 2021-01-22 RX ORDER — TRAMADOL HYDROCHLORIDE 50 MG/1
50 TABLET ORAL EVERY 6 HOURS PRN
Qty: 8 TABLET | Refills: 0 | Status: SHIPPED | OUTPATIENT
Start: 2021-01-22 | End: 2021-01-24

## 2021-01-22 RX ADMIN — IOPAMIDOL 100 ML: 755 INJECTION, SOLUTION INTRAVENOUS at 11:40

## 2021-01-22 RX ADMIN — METOCLOPRAMIDE 10 MG: 5 INJECTION, SOLUTION INTRAMUSCULAR; INTRAVENOUS at 10:50

## 2021-01-22 RX ADMIN — SODIUM CHLORIDE, POTASSIUM CHLORIDE, SODIUM LACTATE AND CALCIUM CHLORIDE 1000 ML: 600; 310; 30; 20 INJECTION, SOLUTION INTRAVENOUS at 10:51

## 2021-01-22 RX ADMIN — DIPHENHYDRAMINE HYDROCHLORIDE 25 MG: 50 INJECTION, SOLUTION INTRAMUSCULAR; INTRAVENOUS at 10:49

## 2021-01-22 ASSESSMENT — PAIN DESCRIPTION - ONSET: ONSET: ON-GOING

## 2021-01-22 ASSESSMENT — PAIN DESCRIPTION - PROGRESSION: CLINICAL_PROGRESSION: NOT CHANGED

## 2021-01-22 ASSESSMENT — PAIN DESCRIPTION - DESCRIPTORS: DESCRIPTORS: PRESSURE

## 2021-01-22 ASSESSMENT — PAIN DESCRIPTION - ORIENTATION: ORIENTATION: RIGHT

## 2021-01-22 ASSESSMENT — PAIN DESCRIPTION - LOCATION: LOCATION: ARM

## 2021-01-22 NOTE — ED PROVIDER NOTES
[address] [facility] ENCOUNTER      Pt Name: Eugune Gowers  MRN: [id number]  YOB: 1980  Date of evaluation: 1/22/2021  Provider: Shaun Keen MD    CHIEF COMPLAINT       Chief Complaint   Patient presents with    Emesis         HISTORY OF PRESENT ILLNESS  (Location/Symptom, Timing/Onset, Context/Setting, Quality, Duration, Modifying Factors, Severity.)   Eugune Gowers is a 45 y.o. female who presents to the emergency department complaining of nausea and vomiting for a week now. Vomitus has had no blood or bile. Also some short right lower quadrant pains. Bowel movements have been normal.  No dysuria or discharge. Also complains of throbbing headaches. States she has a history of migraine but this does not feel like it. She was seen at [facility] ED 6 days ago. She had blood work and head CT and recalls being told they were normal.  She was discharged on Phenergan. History of cholecystectomy, Eliazar-en-Y gastric bypass for obesity. Has asthma and PCOS. Denies alcohol use. Nursing notes were reviewed. REVIEW OFSYSTEMS    (2-9 systems for level 4, 10 or more for level 5)   ROS:  General:  No fevers, no chills, no weakness  Cardiovascular:  No chest pain, no palpitations  Respiratory:  No shortness of breath, no cough, no wheezing  Gastrointestinal:+ pain, + nausea, + vomiting, no diarrhea  Musculoskeletal:  No muscle pain, no joint pain  Skin:  No rash, no easy bruising  Neurologic:  No speech problems, + headache, no extremity weakness  Psychiatric:  No anxiety  Genitourinary:  No dysuria, no hematuria    Except as noted above the remainder of the review of systems was reviewed and negative.        PAST MEDICAL HISTORY     Past Medical History:   Diagnosis Date    Asthma     PCOS (polycystic ovarian syndrome)     Sickle cell disease ([facility] [address]          SURGICAL HISTORY       Past Surgical History: Procedure Laterality Date    CHOLECYSTECTOMY      GASTRIC BYPASS SURGERY      LAPAROSCOPY  2012    MO DILATION/CURETTAGE,DIAGNOSTIC  2012    D & C         CURRENT MEDICATIONS       Previous Medications    CYANOCOBALAMIN (VITAMIN B 12) 100 MCG LOZG    Take by mouth    FERROUS SULFATE (IRON PO)    Take by mouth    PROMETHAZINE (PHENERGAN) 25 MG TABLET    Take 25 mg by mouth every 8 hours as needed for Nausea       ALLERGIES     Patient has no known allergies.     FAMILY HISTORY       Family History   Problem Relation Age of Onset    Heart Disease Father 28          SOCIAL HISTORY       Social History     Socioeconomic History    Marital status:      Spouse name: None    Number of children: None    Years of education: None    Highest education level: None   Occupational History    None   Social Needs    Financial resource strain: None    Food insecurity     Worry: None     Inability: None    Transportation needs     Medical: None     Non-medical: None   Tobacco Use    Smoking status: Never Smoker    Smokeless tobacco: Never Used   Substance and Sexual Activity    Alcohol use: No    Drug use: No    Sexual activity: None   Lifestyle    Physical activity     Days per week: None     Minutes per session: None    Stress: None   Relationships    Social connections     Talks on phone: None     Gets together: None     Attends Jainism service: None     Active member of club or organization: None     Attends meetings of clubs or organizations: None     Relationship status: None    Intimate partner violence     Fear of current or ex partner: None     Emotionally abused: None     Physically abused: None     Forced sexual activity: None   Other Topics Concern    None   Social History Narrative    None         PHYSICAL EXAM    (up to 7 for level 4, 8 or more for level 5)     ED Triage Vitals [01/22/21 1024]   BP Temp Temp Source Pulse Resp SpO2 Height Weight   (!) 151/91 98.5 °F (36.9 °C) Oral 115 18 100 % 5' 1\" (1.549 m) 177 lb (80.3 kg)       Physical Exam  General :Patient is awake, alert, oriented, in no acute distress, nontoxic appearing  HEENT: Pupils are equally round and reactive to light, EOMI, conjunctivae clear. Oral mucosa is moist, no exudate. Uvula is midline  Neck: Neck is supple, full range of motion, trachea midline  Cardiac: Heart regular rate, rhythm, no murmurs, rubs, or gallops  Lungs: Lungs are clear to auscultation, there is no wheezing, rhonchi, or rales. There is no use of accessory muscles. Chest wall: There is no tenderness to palpation over the chest wall or over ribs  Abdomen: Abdomen is soft, nontender, nondistended. There is no firm or pulsatile masses, no rebound rigidity or guarding. Musculoskeletal: 5 out of 5 strength in all 4 extremities. No focal muscle deficits are appreciated  Neuro: Motor intact, sensory intact, level of consciousness is normal, cerebellar function is normal, reflexes are grossly normal. No evidence of incontinence or loss of bowel or bladder function, no saddle anesthesia noted   Dermatology: Skin is warm and dry  Psych: Mentation is grossly normal, cognition is grossly normal. Affect is appropriate. DIAGNOSTIC RESULTS     EKG: All EKG's are interpreted by the Emergency Department Physician who either signs or Co-signs this chart in the [address] a cardiologist.    The EKG interpreted by me shows     RADIOLOGY:   Non-plain film images such as CT, Ultrasound and MRI are read by the radiologist. Plain radiographic images are visualized and preliminarily interpreted by the emergency physician with the below findings:      ? Radiologist's Report Reviewed:  CT ABDOMEN PELVIS W IV CONTRAST Additional Contrast? None   Final Result   1. No acute abnormality of the abdomen or pelvis. 2. Heterogeneous appearance of the uterus which may represent multiple leiomyomas. 3. Surgical absence of the gallbladder. 4. Pulmonary nodularity, as above.  This is stable from the prior study, favoring a benign etiology.                ED BEDSIDE ULTRASOUND:   Performed by ED Physician - none    LABS:    I have reviewed and interpreted all of the currently available lab results from this visit (ifapplicable):  Results for orders placed or performed during the hospital encounter of 01/22/21   CBC Auto Differential   Result Value Ref Range    WBC 4.5 4.0 - 11.0 K/uL    RBC 4.54 3.80 - 5.80 M/uL    Hemoglobin 8.2 (L) 11.5 - 16.5 g/dL    Hematocrit 30.3 (L) 37.0 - 47.0 %    MCV 66.7 (L) 80.0 - 100.0 fL    MCH 18.1 (L) 27.0 - 32.0 pg    MCHC 27.1 (L) 31.0 - 35.0 g/dL    RDW 20.1 (H) 11.0 - 16.0 %    Platelets 668 125 - 859 K/uL    MPV 9.3 6.0 - 10.0 fL    Neutrophils % 67.8 %    Immature Granulocytes % 0.4 0.0 - 5.0 %    Lymphocytes % 22.0 %    Monocytes % 7.3 %    Eosinophils % 1.8 %    Basophils % 0.7 %    Neutrophils Absolute 3.1 2.0 - 7.5 K/uL    Immature Granulocytes # 0.0 K/uL    Lymphocytes Absolute 1.0 (L) 1.5 - 4.0 K/uL    Monocytes Absolute 0.3 0.2 - 0.8 K/uL    Eosinophils Absolute 0.1 0.0 - 0.4 K/uL    Basophils Absolute 0.0 0.0 - 0.1 K/uL   Comprehensive Metabolic Panel w/ Reflex to MG   Result Value Ref Range    Sodium 137 136 - 145 mmol/L    Potassium reflex Magnesium 3.6 3.4 - 5.1 mmol/L    Chloride 107 98 - 107 mmol/L    CO2 20 20 - 30 mmol/L    Anion Gap 10 3 - 16    Glucose 94 74 - 106 mg/dL    BUN 10 6 - 20 mg/dL    CREATININE 0.7 0.4 - 1.2 mg/dL    GFR Non-African American >60 >59    GFR African American >59 >59    Calcium 9.0 8.5 - 10.5 mg/dL    Total Protein 7.6 6.4 - 8.3 g/dL    Alb 4.3 3.4 - 4.8 g/dL    Albumin/Globulin Ratio 1.3 0.8 - 2.0    Total Bilirubin <0.2 (L) 0.3 - 1.2 mg/dL    Alkaline Phosphatase 90 25 - 100 U/L    ALT 7 4 - 36 U/L    AST 13 8 - 33 U/L    Globulin 3.3 g/dL   Lipase   Result Value Ref Range    Lipase 17.0 5.6 - 51.3 U/L   Pregnancy, Urine   Result Value Ref Range    HCG(Urine) Pregnancy Test Negative Detects HCG level >20 MIU/mL Urinalysis Reflex to Culture    Specimen: Urine, clean catch   Result Value Ref Range    Color, UA Yellow Straw/Yellow    Clarity, UA CLOUDY (A) Clear    Glucose, Ur Negative Negative mg/dL    Bilirubin Urine SMALL (A) Negative    Ketones, Urine Negative Negative mg/dL    Specific Gravity, UA >=1.030 1.005 - 1.030    Blood, Urine Negative Negative    pH, UA 5.5 5.0 - 8.0    Protein, UA Negative Negative mg/dL    Urobilinogen, Urine 1.0 <2.0 E.U./dL    Nitrite, Urine Negative Negative    Leukocyte Esterase, Urine Negative Negative    Microscopic Examination YES     Urine Type Cleancatch     Urine Reflex to Culture Yes    Microscopic Urinalysis   Result Value Ref Range    WBC, UA 10-20 (A) 0 - 5 /HPF    RBC, UA None seen 0 - 4 /HPF    Epithelial Cells, UA 11-20 (A) 0 - 5 /HPF    Bacteria, UA 4+ (A) None Seen /HPF    Amorphous, UA 4+ /HPF        All other labs were within normal range or not returned as of this dictation. EMERGENCY DEPARTMENT COURSE and DIFFERENTIAL DIAGNOSIS/MDM:   Vitals:    Vitals:    01/22/21 1024   BP: (!) 151/91   Pulse: 115   Resp: 18   Temp: 98.5 °F (36.9 °C)   TempSrc: Oral   SpO2: 100%   Weight: 177 lb (80.3 kg)   Height: 5' 1\" (1.549 m)       MEDICATIONS ADMINISTERED IN ED:  Medications   lactated ringers infusion 1,000 mL (has no administration in time range)   metoclopramide (REGLAN) injection 10 mg (has no administration in time range)   diphenhydrAMINE (BENADRYL) injection 25 mg (has no administration in time range)     1207 PM.   Discussed test results with her    Overall she feels better. Reviewed the diagnosis and treatment plan. She is almost out of Phenergan. Will write her prescription. She has anemia. Very likely iron deficiency. Chart review shows she is taking iron. Advised her to discuss this with her primary care physician for possible further work-up. She has migraine headaches. She reports Tylenol has not been helping.   She is not allowed to take nonsteroidals because of the Eliazar-en-Y. We will write her a few Ultram tabs. Advised of potential side effects. The patient will follow-up with their PCP in 1-2 days for reevaluation. If the patient or family members have anyfurther concerns or any worsening symptoms they will return to the ED for reevaluation. CONSULTS:  None    PROCEDURES:  Procedures    CRITICAL CARE TIME    Total Critical Care time was 0 minutes, excluding separately reportable procedures. There was a high probability of clinically significant/life threatening deterioration in the patient's condition which required my urgent intervention. FINAL IMPRESSION      1. Non-intractable vomiting with nausea, unspecified vomiting type    2. Acute cystitis without hematuria    3. Pulmonary nodule    4. Anemia, unspecified type    5. Leiomyoma    6. Other headache syndrome          DISPOSITION/PLAN   DISPOSITION        PATIENT REFERRED TO:  ERICA Wiley CNP  [address]  [phone]    In 1 day        DISCHARGE MEDICATIONS:  New Prescriptions    PROMETHAZINE (PHENERGAN) 25 MG TABLET    Take 1 tablet by mouth 4 times daily as needed for Nausea    TRAMADOL (ULTRAM) 50 MG TABLET    Take 1 tablet by mouth every 6 hours as needed for Pain for up to 2 days. Intended supply: 3 days. Take lowest dose possible to manage pain       Comment: Please note this report has been produced using speech recognition software and may contain errorsrelated to that system including errors in grammar, punctuation, and spelling, as well as words and phrases that may be inappropriate. If there are any questions or concerns please feel free to contact the dictating providerfor clarification.     Feliciano Perez MD  Attending Emergency Physician             Feliciano Perez MD  01/22/21 7127

## 2021-01-22 NOTE — ED TRIAGE NOTES
Patient states that she has been sick for one week with vomiting, no energy, headache, pressure from her right shoulder down to her fingers, right side pain, passing out. Patient states the symptoms come and go. Has been in [facility] last Saturday with same symptoms.

## 2021-01-22 NOTE — ED NOTES
Pt notified nurses are tied up with another emergency, and will get to her discharge as soon as possible. Pt verbalized understanding.  IV removed att and pt told she could get dressed while waiting     [place] Duty  01/22/21 1472

## 2021-01-23 LAB — URINE CULTURE, ROUTINE: NORMAL

## 2021-02-03 ENCOUNTER — OFFICE VISIT (OUTPATIENT)
Dept: PRIMARY CARE CLINIC | Age: 41
End: 2021-02-03
Payer: COMMERCIAL

## 2021-02-03 ENCOUNTER — HOSPITAL ENCOUNTER (OUTPATIENT)
Facility: HOSPITAL | Age: 41
Discharge: HOME | End: 2021-02-03
Payer: COMMERCIAL

## 2021-02-03 VITALS
TEMPERATURE: 98.6 F | HEART RATE: 70 BPM | BODY MASS INDEX: 33.61 KG/M2 | DIASTOLIC BLOOD PRESSURE: 70 MMHG | RESPIRATION RATE: 16 BRPM | WEIGHT: 178 LBS | SYSTOLIC BLOOD PRESSURE: 108 MMHG | OXYGEN SATURATION: 98 % | HEIGHT: 61 IN

## 2021-02-03 DIAGNOSIS — R53.83 FATIGUE, UNSPECIFIED TYPE: ICD-10-CM

## 2021-02-03 DIAGNOSIS — D64.9 ANEMIA, UNSPECIFIED TYPE: ICD-10-CM

## 2021-02-03 DIAGNOSIS — R40.20 LOC (LOSS OF CONSCIOUSNESS) (HCC): ICD-10-CM

## 2021-02-03 DIAGNOSIS — Z86.69 HISTORY OF SEIZURES AS A CHILD: ICD-10-CM

## 2021-02-03 DIAGNOSIS — R40.20 LOC (LOSS OF CONSCIOUSNESS) (HCC): Primary | ICD-10-CM

## 2021-02-03 DIAGNOSIS — R51.9 NONINTRACTABLE HEADACHE, UNSPECIFIED CHRONICITY PATTERN, UNSPECIFIED HEADACHE TYPE: ICD-10-CM

## 2021-02-03 PROCEDURE — 1036F TOBACCO NON-USER: CPT | Performed by: NURSE PRACTITIONER

## 2021-02-03 PROCEDURE — G8427 DOCREV CUR MEDS BY ELIG CLIN: HCPCS | Performed by: NURSE PRACTITIONER

## 2021-02-03 PROCEDURE — 99214 OFFICE O/P EST MOD 30 MIN: CPT | Performed by: NURSE PRACTITIONER

## 2021-02-03 PROCEDURE — G8484 FLU IMMUNIZE NO ADMIN: HCPCS | Performed by: NURSE PRACTITIONER

## 2021-02-03 PROCEDURE — 82728 ASSAY OF FERRITIN: CPT

## 2021-02-03 PROCEDURE — 83550 IRON BINDING TEST: CPT

## 2021-02-03 PROCEDURE — G8417 CALC BMI ABV UP PARAM F/U: HCPCS | Performed by: NURSE PRACTITIONER

## 2021-02-03 PROCEDURE — 85025 COMPLETE CBC W/AUTO DIFF WBC: CPT

## 2021-02-03 PROCEDURE — 80053 COMPREHEN METABOLIC PANEL: CPT

## 2021-02-03 PROCEDURE — 82746 ASSAY OF FOLIC ACID SERUM: CPT

## 2021-02-03 PROCEDURE — 83540 ASSAY OF IRON: CPT

## 2021-02-03 PROCEDURE — 82607 VITAMIN B-12: CPT

## 2021-02-03 PROCEDURE — 84443 ASSAY THYROID STIM HORMONE: CPT

## 2021-02-03 PROCEDURE — 82306 VITAMIN D 25 HYDROXY: CPT

## 2021-02-03 RX ORDER — TRAMADOL HYDROCHLORIDE 50 MG/1
50 TABLET ORAL EVERY 6 HOURS PRN
COMMUNITY
Start: 2021-01-25 | End: 2021-03-22

## 2021-02-03 RX ORDER — ROPINIROLE 2 MG/1
4 TABLET, FILM COATED, EXTENDED RELEASE ORAL NIGHTLY
COMMUNITY
Start: 2021-02-02 | End: 2021-03-22

## 2021-02-03 RX ORDER — TOPIRAMATE 25 MG/1
25 TABLET ORAL NIGHTLY
COMMUNITY
Start: 2020-12-31 | End: 2021-02-03

## 2021-02-03 RX ORDER — ETHINYL ESTRADIOL/DROSPIRENONE 0.02-3(28)
TABLET ORAL
COMMUNITY
Start: 2021-01-24

## 2021-02-03 RX ORDER — TOPIRAMATE 50 MG/1
50 TABLET, FILM COATED ORAL NIGHTLY
Qty: 30 TABLET | Refills: 5 | Status: SHIPPED | OUTPATIENT
Start: 2021-02-03 | End: 2021-02-04 | Stop reason: SDUPTHER

## 2021-02-03 RX ORDER — SUMATRIPTAN 50 MG/1
50 TABLET, FILM COATED ORAL
Qty: 9 TABLET | Refills: 5 | Status: SHIPPED | OUTPATIENT
Start: 2021-02-03 | End: 2021-02-04 | Stop reason: SDUPTHER

## 2021-02-03 ASSESSMENT — ENCOUNTER SYMPTOMS
NAUSEA: 0
ABDOMINAL PAIN: 0
VOMITING: 0
SORE THROAT: 0
EYE DISCHARGE: 0
EYE REDNESS: 0
EYE ITCHING: 0
CONSTIPATION: 0
RHINORRHEA: 0
SHORTNESS OF BREATH: 0
COUGH: 0
DIARRHEA: 0

## 2021-02-03 ASSESSMENT — PATIENT HEALTH QUESTIONNAIRE - PHQ9
SUM OF ALL RESPONSES TO PHQ QUESTIONS 1-9: 0
1. LITTLE INTEREST OR PLEASURE IN DOING THINGS: 0

## 2021-02-03 NOTE — PROGRESS NOTES
Have you seen any other physician or provider since your last visit? Yes    Have you had any other diagnostic tests since your last visit? Yes    Have you changed or stopped any medications since your last visit? No    SUBJECTIVE:    Patient ID: Enoc Tang is a 45 y.o. female. Medical History Review  Past Medical, Family, and Social History reviewed. Health Maintenance Due   Topic Date Due    Hepatitis C screen  1980    Varicella vaccine (1 of 2 - 2-dose childhood series) 07/01/1982    HIV screen  07/01/1996    DTaP/Tdap/Td vaccine (1 - Tdap) 07/01/2000    Cervical cancer screen  06/03/2018    Flu vaccine (1) 09/01/2020       HPI:   Chief Complaint   Patient presents with    Migraine    Loss of Consciousness     went to ER- 4x past month    Establish Care     Pt here today to establish care. She states she was recently informed when she was a child she had seizures. She states that she has been having episodes of staring off then passing out. She has not seen neuro and is concerned of possible seizures. She has been to the ER twice. She is having spells at least once a week. The only thing that is going on is not sleeping well due to RLS. She was told her Hgb was 7. She said she has been anemic all her life. She takes one iron pill a day. She only has a period about once every 6 months, even on BCP. She has been on Topamax for about 5 months for migraines. She has 7-14 migraines per month now. Patient's medications, allergies, past medical, surgical, social and family histories were reviewed and updated as appropriate. Review of Systems   Constitutional: Negative for chills, fatigue and fever. HENT: Negative for congestion, ear pain, rhinorrhea and sore throat. Eyes: Negative for discharge, redness and itching. Respiratory: Negative for cough and shortness of breath. Cardiovascular: Negative for chest pain, palpitations and leg swelling. Gastrointestinal: Negative for abdominal pain, constipation, diarrhea, nausea and vomiting. Endocrine: Negative for cold intolerance and heat intolerance. Genitourinary: Negative for dysuria. Musculoskeletal: Negative for arthralgias and joint swelling. Skin: Negative for rash and wound. Neurological: Positive for syncope and headaches. Negative for weakness. Hematological: Negative for adenopathy. Psychiatric/Behavioral: Negative for dysphoric mood and sleep disturbance. The patient is not nervous/anxious. Reviewed and acurate. See MA note. OBJECTIVE:  /70 (Site: Right Upper Arm, Position: Sitting)   Pulse 70   Temp 98.6 °F (37 °C) (Temporal)   Resp 16   Ht 5' 1\" (1.549 m)   Wt 178 lb (80.7 kg)   LMP 01/20/2021   SpO2 98% Comment: room air  BMI 33.63 kg/m²    Physical Exam  Constitutional:       Appearance: She is well-developed. HENT:      Head: Normocephalic and atraumatic. Right Ear: External ear normal.      Left Ear: External ear normal.   Eyes:      Conjunctiva/sclera: Conjunctivae normal.      Pupils: Pupils are equal, round, and reactive to light. Neck:      Musculoskeletal: Neck supple. Thyroid: No thyromegaly. Vascular: No JVD. Cardiovascular:      Rate and Rhythm: Normal rate and regular rhythm. Heart sounds: Normal heart sounds. No murmur. No friction rub. No gallop. Pulmonary:      Effort: Pulmonary effort is normal. No respiratory distress. Breath sounds: Normal breath sounds. Abdominal:      General: Bowel sounds are normal. There is no distension. Palpations: Abdomen is soft. Tenderness: There is no abdominal tenderness. Musculoskeletal: Normal range of motion. Lymphadenopathy:      Cervical: No cervical adenopathy. Skin:     General: Skin is warm and dry. Neurological:      Mental Status: She is alert and oriented to person, place, and time. Cranial Nerves: No cranial nerve deficit.          Results in Past 30 Days  Result Component Current Result Ref Range Previous Result Ref Range   Albumin/Globulin Ratio 1.4 (2/3/2021) 0.8 - 2.0 Not in Time Range    Albumin 4.0 (2/3/2021) 3.4 - 4.8 g/dL Not in Time Range    Alkaline Phosphatase 94 (2/3/2021) 25 - 100 U/L Not in Time Range    ALT 6 (2/3/2021) 4 - 36 U/L Not in Time Range    AST 15 (2/3/2021) 8 - 33 U/L Not in Time Range    BUN 7 (2/3/2021) 6 - 20 mg/dL Not in Time Range    Calcium 8.9 (2/3/2021) 8.5 - 10.5 mg/dL Not in Time Range    Chloride 109 (H) (2/3/2021) 98 - 107 mmol/L Not in Time Range    CO2 22 (2/3/2021) 20 - 30 mmol/L Not in Time Range    CREATININE 0.6 (2/3/2021) 0.4 - 1.2 mg/dL Not in Time Range    GFR  >59 (2/3/2021) >59 Not in Time Range    GFR Non- >60 (2/3/2021) >59 Not in Time Range    Globulin 2.9 (2/3/2021) g/dL Not in Time Range    Glucose 84 (2/3/2021) 74 - 106 mg/dL Not in Time Range    Potassium 4.2 (2/3/2021) 3.4 - 5.1 mmol/L Not in Time Range    Sodium 140 (2/3/2021) 136 - 145 mmol/L Not in Time Range    Total Bilirubin <0.2 (L) (2/3/2021) 0.3 - 1.2 mg/dL Not in Time Range    Total Protein 6.9 (2/3/2021) 6.4 - 8.3 g/dL Not in Time Range      Hemoglobin A1C (%)   Date Value   04/20/2020 5.4     Microscopic Examination (no units)   Date Value   01/22/2021 YES     LDL Calculated (mg/dL)   Date Value   04/20/2020 80       Lab Results   Component Value Date    WBC 4.3 02/03/2021    NEUTROABS 3.1 02/03/2021    HGB 7.3 (L) 02/03/2021    HCT 28.7 (L) 02/03/2021    MCV 67.4 (L) 02/03/2021     (H) 02/03/2021     Lab Results   Component Value Date    TSH 1.92 02/03/2021       Prior to Visit Medications    Medication Sig Taking?  Authorizing Provider   SUMAtriptan (IMITREX) 50 MG tablet Take 1 tablet by mouth daily as needed for Migraine May repeat in 2 hours Yes ERICA Bueno   topiramate (TOPAMAX) 50 MG tablet Take 1 tablet by mouth nightly Yes ERICA Bueno   rOPINIRole (REQUIP XL) 2 MG 2/22/2021 at 11:13 PM.      I, Keshia MALDONADO, personally performed the services described in the documentation as scribed by Cuauhtemoc Cheek CMA, in my presence and it is both accurate and complete.

## 2021-02-04 LAB
A/G RATIO: 1.4 (ref 0.8–2)
ALBUMIN SERPL-MCNC: 4 G/DL (ref 3.4–4.8)
ALP BLD-CCNC: 94 U/L (ref 25–100)
ALT SERPL-CCNC: 6 U/L (ref 4–36)
ANION GAP SERPL CALCULATED.3IONS-SCNC: 9 MMOL/L (ref 3–16)
AST SERPL-CCNC: 15 U/L (ref 8–33)
BASOPHILS ABSOLUTE: 0 K/UL (ref 0–0.1)
BASOPHILS RELATIVE PERCENT: 0.5 %
BILIRUB SERPL-MCNC: <0.2 MG/DL (ref 0.3–1.2)
BUN BLDV-MCNC: 7 MG/DL (ref 6–20)
CALCIUM SERPL-MCNC: 8.9 MG/DL (ref 8.5–10.5)
CHLORIDE BLD-SCNC: 109 MMOL/L (ref 98–107)
CO2: 22 MMOL/L (ref 20–30)
CREAT SERPL-MCNC: 0.6 MG/DL (ref 0.4–1.2)
EOSINOPHILS ABSOLUTE: 0.1 K/UL (ref 0–0.4)
EOSINOPHILS RELATIVE PERCENT: 2.1 %
FERRITIN: 3.9 NG/ML (ref 22–322)
FOLATE: 6.88 NG/ML
GFR AFRICAN AMERICAN: >59
GFR NON-AFRICAN AMERICAN: >60
GLOBULIN: 2.9 G/DL
GLUCOSE BLD-MCNC: 84 MG/DL (ref 74–106)
HCT VFR BLD CALC: 28.7 % (ref 37–47)
HEMOGLOBIN: 7.3 G/DL (ref 11.5–16.5)
IMMATURE GRANULOCYTES #: 0 K/UL
IMMATURE GRANULOCYTES %: 0.5 % (ref 0–5)
IRON SATURATION: 3 % (ref 15–50)
IRON: 13 UG/DL (ref 37–145)
LYMPHOCYTES ABSOLUTE: 0.9 K/UL (ref 1.5–4)
LYMPHOCYTES RELATIVE PERCENT: 21.7 %
MCH RBC QN AUTO: 17.1 PG (ref 27–32)
MCHC RBC AUTO-ENTMCNC: 25.4 G/DL (ref 31–35)
MCV RBC AUTO: 67.4 FL (ref 80–100)
MONOCYTES ABSOLUTE: 0.2 K/UL (ref 0.2–0.8)
MONOCYTES RELATIVE PERCENT: 4.6 %
NEUTROPHILS ABSOLUTE: 3.1 K/UL (ref 2–7.5)
NEUTROPHILS RELATIVE PERCENT: 70.6 %
PDW BLD-RTO: 20.5 % (ref 11–16)
PLATELET # BLD: 488 K/UL (ref 150–400)
PMV BLD AUTO: 10.6 FL (ref 6–10)
POTASSIUM SERPL-SCNC: 4.2 MMOL/L (ref 3.4–5.1)
RBC # BLD: 4.26 M/UL (ref 3.8–5.8)
SODIUM BLD-SCNC: 140 MMOL/L (ref 136–145)
TOTAL IRON BINDING CAPACITY: 408 UG/DL (ref 250–450)
TOTAL PROTEIN: 6.9 G/DL (ref 6.4–8.3)
TSH SERPL DL<=0.05 MIU/L-ACNC: 1.92 UIU/ML (ref 0.27–4.2)
VITAMIN B-12: 266 PG/ML (ref 211–911)
VITAMIN D 25-HYDROXY: 22.2 (ref 32–100)
WBC # BLD: 4.3 K/UL (ref 4–11)

## 2021-02-04 RX ORDER — TOPIRAMATE 50 MG/1
50 TABLET, FILM COATED ORAL NIGHTLY
Qty: 30 TABLET | Refills: 5 | Status: SHIPPED | OUTPATIENT
Start: 2021-02-04 | End: 2021-02-25

## 2021-02-04 RX ORDER — SUMATRIPTAN 50 MG/1
50 TABLET, FILM COATED ORAL DAILY PRN
Qty: 9 TABLET | Refills: 5 | Status: SHIPPED | OUTPATIENT
Start: 2021-02-04

## 2021-02-05 ENCOUNTER — TELEPHONE (OUTPATIENT)
Dept: PRIMARY CARE CLINIC | Age: 41
End: 2021-02-05

## 2021-02-05 DIAGNOSIS — D50.8 IRON DEFICIENCY ANEMIA REFRACTORY TO IRON THERAPY: ICD-10-CM

## 2021-02-05 DIAGNOSIS — D50.9 IRON DEFICIENCY ANEMIA, UNSPECIFIED IRON DEFICIENCY ANEMIA TYPE: ICD-10-CM

## 2021-02-05 RX ORDER — EPINEPHRINE 1 MG/ML
0.3 INJECTION, SOLUTION, CONCENTRATE INTRAVENOUS PRN
Status: CANCELLED | OUTPATIENT
Start: 2021-02-05

## 2021-02-05 RX ORDER — CHOLECALCIFEROL (VITAMIN D3) 125 MCG
2000 CAPSULE ORAL DAILY
Qty: 30 TABLET | Refills: 5 | Status: SHIPPED | OUTPATIENT
Start: 2021-02-05

## 2021-02-05 RX ORDER — METHYLPREDNISOLONE SODIUM SUCCINATE 125 MG/2ML
125 INJECTION, POWDER, LYOPHILIZED, FOR SOLUTION INTRAMUSCULAR; INTRAVENOUS ONCE
Status: CANCELLED | OUTPATIENT
Start: 2021-02-05 | End: 2021-02-05

## 2021-02-05 RX ORDER — SODIUM CHLORIDE 0.9 % (FLUSH) 0.9 %
10 SYRINGE (ML) INJECTION PRN
Status: CANCELLED | OUTPATIENT
Start: 2021-02-05

## 2021-02-05 RX ORDER — SODIUM CHLORIDE 9 MG/ML
INJECTION, SOLUTION INTRAVENOUS CONTINUOUS
Status: CANCELLED | OUTPATIENT
Start: 2021-02-05

## 2021-02-05 RX ORDER — DIPHENHYDRAMINE HYDROCHLORIDE 50 MG/ML
50 INJECTION INTRAMUSCULAR; INTRAVENOUS ONCE
Status: CANCELLED | OUTPATIENT
Start: 2021-02-05 | End: 2021-02-05

## 2021-02-05 RX ORDER — SODIUM CHLORIDE 0.9 % (FLUSH) 0.9 %
5 SYRINGE (ML) INJECTION PRN
Status: CANCELLED | OUTPATIENT
Start: 2021-02-05

## 2021-02-05 NOTE — TELEPHONE ENCOUNTER
----- Message from ERICA De La Vega sent at 2/4/2021  5:27 PM EST -----  Please inform her iron is very low and we are going to try to get her set up for IV iron. Vit D is also low. Start D3 2000u daily.

## 2021-02-05 NOTE — TELEPHONE ENCOUNTER
Left vm informing pt a med has been sent to pharmacy and to call us back regarding results. Susan Lee will be setting pt up with IV infusion and contacting patient.

## 2021-02-12 ENCOUNTER — HOSPITAL ENCOUNTER (OUTPATIENT)
Dept: NURSING | Facility: HOSPITAL | Age: 41
Setting detail: INFUSION SERIES
Discharge: HOME | End: 2021-02-14
Payer: COMMERCIAL

## 2021-02-12 VITALS
SYSTOLIC BLOOD PRESSURE: 131 MMHG | RESPIRATION RATE: 16 BRPM | TEMPERATURE: 98.3 F | OXYGEN SATURATION: 100 % | DIASTOLIC BLOOD PRESSURE: 83 MMHG | HEART RATE: 98 BPM

## 2021-02-12 DIAGNOSIS — D50.8 IRON DEFICIENCY ANEMIA REFRACTORY TO IRON THERAPY: ICD-10-CM

## 2021-02-12 DIAGNOSIS — D50.9 IRON DEFICIENCY ANEMIA, UNSPECIFIED IRON DEFICIENCY ANEMIA TYPE: Primary | ICD-10-CM

## 2021-02-12 PROCEDURE — 96365 THER/PROPH/DIAG IV INF INIT: CPT

## 2021-02-12 PROCEDURE — 2580000003 HC RX 258: Performed by: NURSE PRACTITIONER

## 2021-02-12 PROCEDURE — 6360000002 HC RX W HCPCS: Performed by: NURSE PRACTITIONER

## 2021-02-12 RX ORDER — SODIUM CHLORIDE 9 MG/ML
INJECTION, SOLUTION INTRAVENOUS CONTINUOUS
Status: ACTIVE | OUTPATIENT
Start: 2021-02-12 | End: 2021-02-13

## 2021-02-12 RX ORDER — DIPHENHYDRAMINE HYDROCHLORIDE 50 MG/ML
50 INJECTION INTRAMUSCULAR; INTRAVENOUS ONCE
Status: CANCELLED | OUTPATIENT
Start: 2021-02-19 | End: 2021-02-19

## 2021-02-12 RX ORDER — EPINEPHRINE 1 MG/ML
0.3 INJECTION, SOLUTION, CONCENTRATE INTRAVENOUS PRN
Status: CANCELLED | OUTPATIENT
Start: 2021-02-19

## 2021-02-12 RX ORDER — SODIUM CHLORIDE 9 MG/ML
INJECTION, SOLUTION INTRAVENOUS CONTINUOUS
Status: CANCELLED | OUTPATIENT
Start: 2021-02-19

## 2021-02-12 RX ORDER — METHYLPREDNISOLONE SODIUM SUCCINATE 125 MG/2ML
125 INJECTION, POWDER, LYOPHILIZED, FOR SOLUTION INTRAMUSCULAR; INTRAVENOUS ONCE
Status: CANCELLED | OUTPATIENT
Start: 2021-02-19 | End: 2021-02-19

## 2021-02-12 RX ORDER — SODIUM CHLORIDE 0.9 % (FLUSH) 0.9 %
10 SYRINGE (ML) INJECTION PRN
Status: CANCELLED | OUTPATIENT
Start: 2021-02-19

## 2021-02-12 RX ORDER — SODIUM CHLORIDE 0.9 % (FLUSH) 0.9 %
5 SYRINGE (ML) INJECTION PRN
Status: CANCELLED | OUTPATIENT
Start: 2021-02-19

## 2021-02-12 RX ADMIN — SODIUM CHLORIDE: 9 INJECTION, SOLUTION INTRAVENOUS at 13:50

## 2021-02-12 RX ADMIN — FERRIC CARBOXYMALTOSE INJECTION 750 MG: 50 INJECTION, SOLUTION INTRAVENOUS at 14:06

## 2021-02-19 ENCOUNTER — HOSPITAL ENCOUNTER (OUTPATIENT)
Dept: NURSING | Facility: HOSPITAL | Age: 41
Setting detail: INFUSION SERIES
Discharge: HOME | End: 2021-02-21
Payer: COMMERCIAL

## 2021-02-19 VITALS
DIASTOLIC BLOOD PRESSURE: 74 MMHG | RESPIRATION RATE: 16 BRPM | TEMPERATURE: 98.8 F | HEART RATE: 97 BPM | OXYGEN SATURATION: 100 % | SYSTOLIC BLOOD PRESSURE: 126 MMHG

## 2021-02-19 DIAGNOSIS — D50.8 IRON DEFICIENCY ANEMIA REFRACTORY TO IRON THERAPY: ICD-10-CM

## 2021-02-19 DIAGNOSIS — D50.9 IRON DEFICIENCY ANEMIA, UNSPECIFIED IRON DEFICIENCY ANEMIA TYPE: Primary | ICD-10-CM

## 2021-02-19 PROCEDURE — 96365 THER/PROPH/DIAG IV INF INIT: CPT

## 2021-02-19 PROCEDURE — 2580000003 HC RX 258: Performed by: NURSE PRACTITIONER

## 2021-02-19 PROCEDURE — 6360000002 HC RX W HCPCS: Performed by: NURSE PRACTITIONER

## 2021-02-19 RX ORDER — SODIUM CHLORIDE 9 MG/ML
INJECTION, SOLUTION INTRAVENOUS CONTINUOUS
Status: ACTIVE | OUTPATIENT
Start: 2021-02-19 | End: 2021-02-20

## 2021-02-19 RX ORDER — DIPHENHYDRAMINE HYDROCHLORIDE 50 MG/ML
50 INJECTION INTRAMUSCULAR; INTRAVENOUS ONCE
Status: CANCELLED | OUTPATIENT
Start: 2021-02-19 | End: 2021-02-19

## 2021-02-19 RX ORDER — METHYLPREDNISOLONE SODIUM SUCCINATE 125 MG/2ML
125 INJECTION, POWDER, LYOPHILIZED, FOR SOLUTION INTRAMUSCULAR; INTRAVENOUS ONCE
Status: CANCELLED | OUTPATIENT
Start: 2021-02-19 | End: 2021-02-19

## 2021-02-19 RX ORDER — EPINEPHRINE 1 MG/ML
0.3 INJECTION, SOLUTION, CONCENTRATE INTRAVENOUS PRN
Status: CANCELLED | OUTPATIENT
Start: 2021-02-19

## 2021-02-19 RX ORDER — SODIUM CHLORIDE 0.9 % (FLUSH) 0.9 %
10 SYRINGE (ML) INJECTION PRN
Status: CANCELLED | OUTPATIENT
Start: 2021-02-19

## 2021-02-19 RX ORDER — SODIUM CHLORIDE 0.9 % (FLUSH) 0.9 %
5 SYRINGE (ML) INJECTION PRN
Status: CANCELLED | OUTPATIENT
Start: 2021-02-19

## 2021-02-19 RX ORDER — SODIUM CHLORIDE 9 MG/ML
INJECTION, SOLUTION INTRAVENOUS CONTINUOUS
Status: CANCELLED | OUTPATIENT
Start: 2021-02-19

## 2021-02-19 RX ADMIN — SODIUM CHLORIDE: 9 INJECTION, SOLUTION INTRAVENOUS at 14:07

## 2021-02-19 RX ADMIN — FERRIC CARBOXYMALTOSE INJECTION 750 MG: 50 INJECTION, SOLUTION INTRAVENOUS at 14:06

## 2021-02-25 ENCOUNTER — OFFICE VISIT (OUTPATIENT)
Dept: PRIMARY CARE CLINIC | Age: 41
End: 2021-02-25
Payer: COMMERCIAL

## 2021-02-25 VITALS
BODY MASS INDEX: 33.79 KG/M2 | TEMPERATURE: 98 F | SYSTOLIC BLOOD PRESSURE: 130 MMHG | DIASTOLIC BLOOD PRESSURE: 70 MMHG | HEIGHT: 61 IN | RESPIRATION RATE: 16 BRPM | WEIGHT: 179 LBS | HEART RATE: 97 BPM | OXYGEN SATURATION: 99 %

## 2021-02-25 DIAGNOSIS — R40.20 LOC (LOSS OF CONSCIOUSNESS) (HCC): Primary | ICD-10-CM

## 2021-02-25 DIAGNOSIS — E61.1 IRON DEFICIENCY: ICD-10-CM

## 2021-02-25 DIAGNOSIS — Z86.69 HISTORY OF SEIZURES AS A CHILD: ICD-10-CM

## 2021-02-25 DIAGNOSIS — G25.81 RLS (RESTLESS LEGS SYNDROME): ICD-10-CM

## 2021-02-25 PROCEDURE — G8484 FLU IMMUNIZE NO ADMIN: HCPCS | Performed by: NURSE PRACTITIONER

## 2021-02-25 PROCEDURE — 1036F TOBACCO NON-USER: CPT | Performed by: NURSE PRACTITIONER

## 2021-02-25 PROCEDURE — G8427 DOCREV CUR MEDS BY ELIG CLIN: HCPCS | Performed by: NURSE PRACTITIONER

## 2021-02-25 PROCEDURE — G8417 CALC BMI ABV UP PARAM F/U: HCPCS | Performed by: NURSE PRACTITIONER

## 2021-02-25 PROCEDURE — 99214 OFFICE O/P EST MOD 30 MIN: CPT | Performed by: NURSE PRACTITIONER

## 2021-02-25 RX ORDER — TOPIRAMATE 50 MG/1
100 TABLET, FILM COATED ORAL NIGHTLY
Qty: 60 TABLET | Refills: 5 | Status: SHIPPED | OUTPATIENT
Start: 2021-02-25 | End: 2021-06-16 | Stop reason: ALTCHOICE

## 2021-02-25 RX ORDER — PRAMIPEXOLE DIHYDROCHLORIDE 0.12 MG/1
0.12 TABLET ORAL NIGHTLY
Qty: 60 TABLET | Refills: 5 | Status: SHIPPED | OUTPATIENT
Start: 2021-02-25 | End: 2021-06-16 | Stop reason: ALTCHOICE

## 2021-02-25 ASSESSMENT — ENCOUNTER SYMPTOMS
CONSTIPATION: 0
DIARRHEA: 0
VOMITING: 0
COUGH: 0
SORE THROAT: 0
SHORTNESS OF BREATH: 0
EYE ITCHING: 0
ABDOMINAL PAIN: 0
RHINORRHEA: 0
EYE REDNESS: 0
NAUSEA: 0
EYE DISCHARGE: 0

## 2021-02-25 NOTE — PATIENT INSTRUCTIONS
· Keep a list of your medicines with you. List all of the prescription medicines, nonprescription medicines, supplements, natural remedies, and vitamins that you take. Tell your healthcare providers who treat you about all of the products you are taking. Your provider can provide you with a form to keep track of them. Just ask. · Follow the directions that come with your medicine, including information about food or alcohol. Make sure you know how and when to take your medicine. Do not take more or less than you are supposed to take. · Keep all medicines out of the reach of children. · Store medicines according to the directions on the label. · Monitor yourself. Learn to know how your body reacts to your new medicine and keep track of how it makes you feel before attempting (If your provider has allowed you to do so) to drive or go to work. · Seek emergency medical attention if you think you have used too much of this medicine. An overdose of any prescription medicine can be fatal. Overdose symptoms may include extreme drowsiness, muscle weakness, confusion, cold and clammy skin, pinpoint pupils, shallow breathing, slow heart rate, fainting, or coma. · Don't share prescription medicines with others, even when they seem to have the same symptoms. What may be good for you may be harmful to others. · If you are no longer taking a prescribed medication and you have pills left please take your pills out of their original containers. Mix crushed pills with an undesirable substance, such as cat litter or used coffee grounds. Put the mixture into a disposable container with a lid, such as an empty margarine tub, or into a sealable bag. Cover up or remove any of your personal information on the empty containers by covering it with black permanent marker or duct tape. Place the sealed container with the mixture, and the empty drug containers, in the trash.    · If you use a medication that is in the form of a patch, dispose of used patches by folding them in half so that the sticky sides meet, and then flushing them down a toilet. They should not be placed in the household trash where children or pets can find them. · If you have any questions, ask your provider or pharmacist for more information. · Be sure to keep all appointments for provider visits or tests. We are committed to providing you with the best care possible. In order to help us achieve these goals please remember to bring all medications, herbal products, and over the counter supplements with you to each visit. If your provider has ordered testing for you, please be sure to follow up with our office if you have not received results within 7 days after the testing took place. *If you receive a survey after visiting one of our offices, please take time to share your experience concerning your physician office visit. These surveys are confidential and no health information about you is shared. We are eager to improve for you and we are counting on your feedback to help make that happen. Thank you for requesting your Continuity of Care Document (CCD) electronically. Please follow the instructions below to securely access your online medical record. [employer] allows you to send messages to your doctor, view your test results, renew your prescriptions, schedule appointments, and more. How Do I Access my CCD? In your Internet browser, go to [email or web address] Enter your user name and password   Click on My medical Record  --> Download Summary --> Enter Password --> Download --> Save or Open Document    Additional Information  If you have questions, please contact your physician practice where you receive care. Remember, [email or web address] is NOT to be used for urgent needs. For medical emergencies, dial 911. Increase Topamax to 100mg at bedtime. Change Ropinirole to Mirapex for restless legs.

## 2021-02-25 NOTE — PROGRESS NOTES
Have you seen any other physician or provider since your last visit? No    Have you had any other diagnostic tests since your last visit? No    Have you changed or stopped any medications since your last visit? No    SUBJECTIVE:    Patient ID: Kimberly Michel is a 45 y.o. female. Medical History Review  Past Medical, Family, and Social History reviewed. Health Maintenance Due   Topic Date Due    Hepatitis C screen  Never done    Varicella vaccine (1 of 2 - 2-dose childhood series) Never done    HIV screen  Never done    COVID-19 Vaccine (1) Never done    DTaP/Tdap/Td vaccine (1 - Tdap) Never done    Cervical cancer screen  06/03/2018    Flu vaccine (1) Never done       HPI:   Chief Complaint   Patient presents with    Loss of Consciousness     Pt is here today for a f/u on syncope. She is still having headaches, worse after an episode. She is supposed to go to neurology in April. She found out recently her grandma and aunt had epilepsy. She continues to have RLS at night, occ during the day. She has not been taking Tramadol. She feels better since she had the iron transfusion. Patient's medications, allergies, past medical, surgical, social and family histories were reviewed and updated as appropriate. Review of Systems   Constitutional: Negative for chills, fatigue and fever. HENT: Negative for congestion, ear pain, rhinorrhea and sore throat. Eyes: Negative for discharge, redness and itching. Respiratory: Negative for cough and shortness of breath. Cardiovascular: Negative for chest pain, palpitations and leg swelling. Gastrointestinal: Negative for abdominal pain, constipation, diarrhea, nausea and vomiting. Endocrine: Negative for cold intolerance and heat intolerance. Genitourinary: Negative for dysuria. Musculoskeletal: Negative for arthralgias and joint swelling. Skin: Negative for rash and wound. Neurological: Positive for syncope.  Negative for weakness and headaches. Hematological: Negative for adenopathy. Psychiatric/Behavioral: Negative for dysphoric mood and sleep disturbance. The patient is not nervous/anxious. Reviewed and acurate. See MA note. OBJECTIVE:  /70 (Site: Right Upper Arm, Position: Sitting)   Pulse 97   Temp 98 °F (36.7 °C) (Temporal)   Resp 16   Ht 5' 1\" (1.549 m)   Wt 179 lb (81.2 kg)   LMP 02/11/2021   SpO2 99% Comment: room air  BMI 33.82 kg/m²    Physical Exam  Constitutional:       Appearance: She is well-developed. HENT:      Head: Normocephalic and atraumatic. Right Ear: External ear normal.      Left Ear: External ear normal.   Eyes:      Conjunctiva/sclera: Conjunctivae normal.      Pupils: Pupils are equal, round, and reactive to light. Neck:      Musculoskeletal: Neck supple. Thyroid: No thyromegaly. Vascular: No JVD. Cardiovascular:      Rate and Rhythm: Normal rate and regular rhythm. Heart sounds: Normal heart sounds. No murmur. No friction rub. No gallop. Pulmonary:      Effort: Pulmonary effort is normal. No respiratory distress. Breath sounds: Normal breath sounds. Abdominal:      General: Bowel sounds are normal. There is no distension. Palpations: Abdomen is soft. Tenderness: There is no abdominal tenderness. Musculoskeletal: Normal range of motion. Lymphadenopathy:      Cervical: No cervical adenopathy. Skin:     General: Skin is warm and dry. Neurological:      Mental Status: She is alert and oriented to person, place, and time. Cranial Nerves: No cranial nerve deficit. No results found for requested labs within last 30 days.      Hemoglobin A1C (%)   Date Value   04/20/2020 5.4     Microscopic Examination (no units)   Date Value   01/22/2021 YES     LDL Calculated (mg/dL)   Date Value   04/20/2020 80       Lab Results   Component Value Date    WBC 4.9 02/26/2021    NEUTROABS 3.2 02/26/2021    HGB 10.2 (L) 02/26/2021    HCT 37.7 02/26/2021    MCV 78.9 (L) 02/26/2021     02/26/2021     Lab Results   Component Value Date    TSH 1.92 02/03/2021       Prior to Visit Medications    Medication Sig Taking? Authorizing Provider   topiramate (TOPAMAX) 50 MG tablet Take 2 tablets by mouth nightly Yes ERICA Barajas   pramipexole (MIRAPEX) 0.125 MG tablet Take 1 tablet by mouth nightly 1 Nightly for a week, then 2 nightly Yes ERICA Barajas   Cholecalciferol (VITAMIN D3) 50 MCG (2000 UT) TABS Take 2,000 Units by mouth daily Yes ERICA Barajas   SUMAtriptan (IMITREX) 50 MG tablet Take 1 tablet by mouth daily as needed for Migraine May repeat in 2 hours Yes ERICA Barajas   Eldonna Donate 3-0.02 MG per tablet  Yes Historical Provider, MD   Cyanocobalamin (B-12 PO) Take by mouth Yes Historical Provider, MD   Ferrous Sulfate (IRON PO) Take by mouth Yes Historical Provider, MD   levETIRAcetam (KEPPRA) 500 MG tablet Take 1 tablet by mouth 2 times daily  ERICA Barajas       ASSESSMENT:  1. LOC (loss of consciousness) ([facility] [address]    2. History of seizures as a child    3. RLS (restless legs syndrome)    4. Iron deficiency        PLAN:  Orders Placed This Encounter   Medications    topiramate (TOPAMAX) 50 MG tablet     Sig: Take 2 tablets by mouth nightly     Dispense:  60 tablet     Refill:  5    pramipexole (MIRAPEX) 0.125 MG tablet     Sig: Take 1 tablet by mouth nightly 1 Nightly for a week, then 2 nightly     Dispense:  60 tablet     Refill:  5     Orders Placed This Encounter   Procedures    CBC WITH AUTO DIFFERENTIAL    FERRITIN    IRON AND TIBC     Patient Instructions   · Keep a list of your medicines with you. List all of the prescription medicines, nonprescription medicines, supplements, natural remedies, and vitamins that you take. Tell your healthcare providers who treat you about all of the products you are taking. Your provider can provide you with a form to keep track of them. Just ask.   · Follow the directions that come with your medicine, including information about food or alcohol. Make sure you know how and when to take your medicine. Do not take more or less than you are supposed to take. · Keep all medicines out of the reach of children. · Store medicines according to the directions on the label. · Monitor yourself. Learn to know how your body reacts to your new medicine and keep track of how it makes you feel before attempting (If your provider has allowed you to do so) to drive or go to work. · Seek emergency medical attention if you think you have used too much of this medicine. An overdose of any prescription medicine can be fatal. Overdose symptoms may include extreme drowsiness, muscle weakness, confusion, cold and clammy skin, pinpoint pupils, shallow breathing, slow heart rate, fainting, or coma. · Don't share prescription medicines with others, even when they seem to have the same symptoms. What may be good for you may be harmful to others. · If you are no longer taking a prescribed medication and you have pills left please take your pills out of their original containers. Mix crushed pills with an undesirable substance, such as cat litter or used coffee grounds. Put the mixture into a disposable container with a lid, such as an empty margarine tub, or into a sealable bag. Cover up or remove any of your personal information on the empty containers by covering it with black permanent marker or duct tape. Place the sealed container with the mixture, and the empty drug containers, in the trash. · If you use a medication that is in the form of a patch, dispose of used patches by folding them in half so that the sticky sides meet, and then flushing them down a toilet. They should not be placed in the household trash where children or pets can find them. · If you have any questions, ask your provider or pharmacist for more information.    · Be sure to keep all appointments for provider visits or tests. We are committed to providing you with the best care possible. In order to help us achieve these goals please remember to bring all medications, herbal products, and over the counter supplements with you to each visit. If your provider has ordered testing for you, please be sure to follow up with our office if you have not received results within 7 days after the testing took place. *If you receive a survey after visiting one of our offices, please take time to share your experience concerning your physician office visit. These surveys are confidential and no health information about you is shared. We are eager to improve for you and we are counting on your feedback to help make that happen. Thank you for requesting your Continuity of Care Document (CCD) electronically. Please follow the instructions below to securely access your online medical record. [employer] allows you to send messages to your doctor, view your test results, renew your prescriptions, schedule appointments, and more. How Do I Access my CCD? In your Internet browser, go to [email or web address] Enter your user name and password   Click on My medical Record  --> Download Summary --> Enter Password --> Download --> Save or Open Document    Additional Information  If you have questions, please contact your physician practice where you receive care. Remember, [employer] is NOT to be used for urgent needs. For medical emergencies, dial 911. Increase Topamax to 100mg at bedtime. Change Ropinirole to Mirapex for restless legs. F/U 1 month. Sierra Garcia CMA am scribing for and in the presence of ERICA Gimenez on 3/22/2021 at 9:47 PM.      I, Roseanna MALDONADO, personally performed the services described in the documentation as scribed by Ivone Bartlett CMA, in my presence and it is both accurate and complete.

## 2021-02-26 ENCOUNTER — HOSPITAL ENCOUNTER (OUTPATIENT)
Facility: HOSPITAL | Age: 41
Discharge: HOME | End: 2021-02-26
Payer: COMMERCIAL

## 2021-02-26 DIAGNOSIS — E61.1 IRON DEFICIENCY: ICD-10-CM

## 2021-02-26 LAB
BASOPHILS ABSOLUTE: 0 K/UL (ref 0–0.1)
BASOPHILS RELATIVE PERCENT: 0.8 %
EOSINOPHILS ABSOLUTE: 0.1 K/UL (ref 0–0.4)
EOSINOPHILS RELATIVE PERCENT: 2.7 %
FERRITIN: 475.1 NG/ML (ref 22–322)
HCT VFR BLD CALC: 37.7 % (ref 37–47)
HEMOGLOBIN: 10.2 G/DL (ref 11.5–16.5)
IMMATURE GRANULOCYTES #: 0 K/UL
IMMATURE GRANULOCYTES %: 0.4 % (ref 0–5)
IRON SATURATION: 21 % (ref 15–50)
IRON: 64 UG/DL (ref 37–145)
LYMPHOCYTES ABSOLUTE: 1.2 K/UL (ref 1.5–4)
LYMPHOCYTES RELATIVE PERCENT: 24.9 %
MCH RBC QN AUTO: 21.3 PG (ref 27–32)
MCHC RBC AUTO-ENTMCNC: 27.1 G/DL (ref 31–35)
MCV RBC AUTO: 78.9 FL (ref 80–100)
MONOCYTES ABSOLUTE: 0.3 K/UL (ref 0.2–0.8)
MONOCYTES RELATIVE PERCENT: 5.2 %
NEUTROPHILS ABSOLUTE: 3.2 K/UL (ref 2–7.5)
NEUTROPHILS RELATIVE PERCENT: 66 %
PLATELET # BLD: 294 K/UL (ref 150–400)
PMV BLD AUTO: 10.5 FL (ref 6–10)
RBC # BLD: 4.78 M/UL (ref 3.8–5.8)
TOTAL IRON BINDING CAPACITY: 305 UG/DL (ref 250–450)
WBC # BLD: 4.9 K/UL (ref 4–11)

## 2021-02-26 PROCEDURE — 83550 IRON BINDING TEST: CPT

## 2021-02-26 PROCEDURE — 85025 COMPLETE CBC W/AUTO DIFF WBC: CPT

## 2021-02-26 PROCEDURE — 83540 ASSAY OF IRON: CPT

## 2021-02-26 PROCEDURE — 82728 ASSAY OF FERRITIN: CPT

## 2021-03-02 DIAGNOSIS — D64.9 ANEMIA, UNSPECIFIED TYPE: Primary | ICD-10-CM

## 2021-03-09 ENCOUNTER — TELEPHONE (OUTPATIENT)
Dept: PRIMARY CARE CLINIC | Age: 41
End: 2021-03-09

## 2021-03-09 DIAGNOSIS — R40.20 LOC (LOSS OF CONSCIOUSNESS) (HCC): Primary | ICD-10-CM

## 2021-03-09 RX ORDER — LEVETIRACETAM 500 MG/1
500 TABLET ORAL 2 TIMES DAILY
Qty: 60 TABLET | Refills: 3 | Status: SHIPPED | OUTPATIENT
Start: 2021-03-09

## 2021-03-09 NOTE — TELEPHONE ENCOUNTER
Pt stated she had another passing out spell and cut her eye. She is unsure whether to start a seizure medication due to her appt being so far away or not?! The Topamax isn't really helping. Advice?

## 2021-03-22 ENCOUNTER — APPOINTMENT (OUTPATIENT)
Dept: GENERAL RADIOLOGY | Facility: HOSPITAL | Age: 41
End: 2021-03-22
Payer: COMMERCIAL

## 2021-03-22 ENCOUNTER — HOSPITAL ENCOUNTER (EMERGENCY)
Facility: HOSPITAL | Age: 41
Discharge: HOME | End: 2021-03-23
Attending: EMERGENCY MEDICINE
Payer: COMMERCIAL

## 2021-03-22 ENCOUNTER — APPOINTMENT (OUTPATIENT)
Dept: CT IMAGING | Facility: HOSPITAL | Age: 41
End: 2021-03-22
Payer: COMMERCIAL

## 2021-03-22 DIAGNOSIS — E87.6 HYPOKALEMIA: ICD-10-CM

## 2021-03-22 DIAGNOSIS — R55 SYNCOPE AND COLLAPSE: Primary | ICD-10-CM

## 2021-03-22 LAB
A/G RATIO: 1.3 (ref 0.8–2)
ALBUMIN SERPL-MCNC: 4.1 G/DL (ref 3.4–4.8)
ALP BLD-CCNC: 94 U/L (ref 25–100)
ALT SERPL-CCNC: 10 U/L (ref 4–36)
AMPHETAMINE SCREEN, URINE: NORMAL
ANION GAP SERPL CALCULATED.3IONS-SCNC: 10 MMOL/L (ref 3–16)
AST SERPL-CCNC: 16 U/L (ref 8–33)
BARBITURATE SCREEN URINE: NORMAL
BASOPHILS ABSOLUTE: 0 K/UL (ref 0–0.1)
BASOPHILS RELATIVE PERCENT: 0.5 %
BENZODIAZEPINE SCREEN, URINE: NORMAL
BILIRUB SERPL-MCNC: <0.2 MG/DL (ref 0.3–1.2)
BUN BLDV-MCNC: 7 MG/DL (ref 6–20)
CALCIUM SERPL-MCNC: 9.1 MG/DL (ref 8.5–10.5)
CANNABINOID SCREEN URINE: NORMAL
CHLORIDE BLD-SCNC: 109 MMOL/L (ref 98–107)
CO2: 21 MMOL/L (ref 20–30)
COCAINE METABOLITE SCREEN URINE: NORMAL
CREAT SERPL-MCNC: 0.8 MG/DL (ref 0.4–1.2)
EOSINOPHILS ABSOLUTE: 0.1 K/UL (ref 0–0.4)
EOSINOPHILS RELATIVE PERCENT: 1.9 %
GFR AFRICAN AMERICAN: >59
GFR NON-AFRICAN AMERICAN: >60
GLOBULIN: 3.1 G/DL
GLUCOSE BLD-MCNC: 108 MG/DL (ref 74–106)
HCG(URINE) PREGNANCY TEST: NEGATIVE
HCT VFR BLD CALC: 39.5 % (ref 37–47)
HEMOGLOBIN: 12.1 G/DL (ref 11.5–16.5)
IMMATURE GRANULOCYTES #: 0 K/UL
IMMATURE GRANULOCYTES %: 0.2 % (ref 0–5)
LYMPHOCYTES ABSOLUTE: 1.2 K/UL (ref 1.5–4)
LYMPHOCYTES RELATIVE PERCENT: 21 %
Lab: NORMAL
MCH RBC QN AUTO: 25.4 PG (ref 27–32)
MCHC RBC AUTO-ENTMCNC: 30.6 G/DL (ref 31–35)
MCV RBC AUTO: 82.8 FL (ref 80–100)
METHADONE SCREEN, URINE: NORMAL
METHAMPHETAMINE, URINE: NORMAL
MONOCYTES ABSOLUTE: 0.3 K/UL (ref 0.2–0.8)
MONOCYTES RELATIVE PERCENT: 4.5 %
NEUTROPHILS ABSOLUTE: 4.2 K/UL (ref 2–7.5)
NEUTROPHILS RELATIVE PERCENT: 71.9 %
OPIATE SCREEN URINE: NORMAL
PHENCYCLIDINE SCREEN URINE: NORMAL
PLATELET # BLD: 265 K/UL (ref 150–400)
PMV BLD AUTO: 9.6 FL (ref 6–10)
POTASSIUM SERPL-SCNC: 3.2 MMOL/L (ref 3.4–5.1)
PRO-BNP: 165 PG/ML (ref 0–900)
PROPOXYPHENE SCREEN, URINE: NORMAL
RBC # BLD: 4.77 M/UL (ref 3.8–5.8)
SODIUM BLD-SCNC: 140 MMOL/L (ref 136–145)
TOTAL PROTEIN: 7.2 G/DL (ref 6.4–8.3)
TRICYCLIC, URINE: NORMAL
TROPONIN: <0.3 NG/ML
UR OXYCODONE RAPID SCREEN: NORMAL
WBC # BLD: 5.8 K/UL (ref 4–11)

## 2021-03-22 PROCEDURE — 71045 X-RAY EXAM CHEST 1 VIEW: CPT

## 2021-03-22 PROCEDURE — 85025 COMPLETE CBC W/AUTO DIFF WBC: CPT

## 2021-03-22 PROCEDURE — 70450 CT HEAD/BRAIN W/O DYE: CPT

## 2021-03-22 PROCEDURE — 83880 ASSAY OF NATRIURETIC PEPTIDE: CPT

## 2021-03-22 PROCEDURE — 36415 COLL VENOUS BLD VENIPUNCTURE: CPT

## 2021-03-22 PROCEDURE — 80053 COMPREHEN METABOLIC PANEL: CPT

## 2021-03-22 PROCEDURE — 84703 CHORIONIC GONADOTROPIN ASSAY: CPT

## 2021-03-22 PROCEDURE — 2580000003 HC RX 258: Performed by: EMERGENCY MEDICINE

## 2021-03-22 PROCEDURE — 84484 ASSAY OF TROPONIN QUANT: CPT

## 2021-03-22 PROCEDURE — 80307 DRUG TEST PRSMV CHEM ANLYZR: CPT

## 2021-03-22 PROCEDURE — 96374 THER/PROPH/DIAG INJ IV PUSH: CPT

## 2021-03-22 PROCEDURE — 99282 EMERGENCY DEPT VISIT SF MDM: CPT

## 2021-03-22 PROCEDURE — 93005 ELECTROCARDIOGRAM TRACING: CPT

## 2021-03-22 PROCEDURE — 6370000000 HC RX 637 (ALT 250 FOR IP): Performed by: EMERGENCY MEDICINE

## 2021-03-22 RX ORDER — POTASSIUM CHLORIDE 20 MEQ/1
20 TABLET, EXTENDED RELEASE ORAL ONCE
Status: COMPLETED | OUTPATIENT
Start: 2021-03-22 | End: 2021-03-22

## 2021-03-22 RX ORDER — 0.9 % SODIUM CHLORIDE 0.9 %
1000 INTRAVENOUS SOLUTION INTRAVENOUS ONCE
Status: COMPLETED | OUTPATIENT
Start: 2021-03-22 | End: 2021-03-23

## 2021-03-22 RX ADMIN — SODIUM CHLORIDE 1000 ML: 9 INJECTION, SOLUTION INTRAVENOUS at 23:26

## 2021-03-22 RX ADMIN — POTASSIUM CHLORIDE 20 MEQ: 1500 TABLET, EXTENDED RELEASE ORAL at 23:26

## 2021-03-23 VITALS
HEART RATE: 53 BPM | DIASTOLIC BLOOD PRESSURE: 80 MMHG | OXYGEN SATURATION: 87 % | SYSTOLIC BLOOD PRESSURE: 134 MMHG | BODY MASS INDEX: 33.04 KG/M2 | HEIGHT: 61 IN | TEMPERATURE: 98 F | RESPIRATION RATE: 17 BRPM | WEIGHT: 175 LBS

## 2021-03-23 PROCEDURE — 6360000002 HC RX W HCPCS: Performed by: EMERGENCY MEDICINE

## 2021-03-23 RX ORDER — ONDANSETRON 2 MG/ML
4 INJECTION INTRAMUSCULAR; INTRAVENOUS ONCE
Status: COMPLETED | OUTPATIENT
Start: 2021-03-23 | End: 2021-03-23

## 2021-03-23 RX ADMIN — ONDANSETRON HYDROCHLORIDE 4 MG: 2 INJECTION, SOLUTION INTRAMUSCULAR; INTRAVENOUS at 00:42

## 2021-03-23 NOTE — ED PROVIDER NOTES
[address] [place] [address] Court  eMERGENCY dEPARTMENT eNCOUnter      Pt Name: Ag Saucedo  MRN: [id number]  [place]: 1980  Date of evaluation: 8/29/2344  Provider: Rosa Clifton MD    [address] [place] [address]       Chief Complaint   Patient presents with    Loss of Consciousness         HISTORY OF PRESENT ILLNESS  (Location/Symptom, Timing/Onset, Context/Setting, Quality, Duration,Modifying Factors, Severity.)   Ag Saucedo is a 45 y.o. female who presents to the emergency department with an episode of syncope in the shower tonight. She had no indications of feeling like she would pass out before it happened. She describes a \"drop attack\". Her  heard her fall and it took him about 10 minutes to get her to come around. She wouldn't let him call an ambulance but did let him bring her to the ER. She started having episodes of fainting about 3 months ago. Episodes were about 1/week and now have recently increased to 2 episodes/week. Many of these episodes have been witnessed. She appears to be \"asleep\" and no tonic-clonic activity has been seen but there has been some \"clinching\" of her fists. None of this was accompanied by urinary incontinence or tongue biting. She does wake up confused and says it takes about 10 minutes for her to return to baseline. About 5 episodes she \"busted\" her nose with bleeding from hitting her face on the floor. One of her falls resulted in her hurting her right shoulder. No chest pain, palpitations, dizziness. No fever or chills. No numbness, tingling or weakness. No headaches before episodes but sometimes she gets a headache afterwards. Nursing notes were reviewed. Her PCP is PEYTON Dumont. She has been seen in the office for this. She had an EKG, bloodwork, xrays and all of that was normal.  She was referred for an EEG which is scheduled in 2 days. Dr. Anahi Hurd is the neurologist who she will see AFTER the EEG is done.   She Heart Disease Father 28          SOCIAL HISTORY       Social History     Socioeconomic History    Marital status:      Spouse name: None    Number of children: None    Years of education: None    Highest education level: None   Occupational History    None   Social Needs    Financial resource strain: None    Food insecurity     Worry: None     Inability: None    Transportation needs     Medical: None     Non-medical: None   Tobacco Use    Smoking status: Never Smoker    Smokeless tobacco: Never Used   Substance and Sexual Activity    Alcohol use: No    Drug use: No    Sexual activity: None   Lifestyle    Physical activity     Days per week: None     Minutes per session: None    Stress: None   Relationships    Social connections     Talks on phone: None     Gets together: None     Attends [employer] service: None     Active member of club or organization: None     Attends meetings of clubs or organizations: None     Relationship status: None    Intimate partner violence     Fear of current or ex partner: None     Emotionally abused: None     Physically abused: None     Forced sexual activity: None   Other Topics Concern    None   Social History Narrative    None         PHYSICAL EXAM    (up to 7 for level 4, 8 or more for level 5)     ED Triage Vitals [03/22/21 2101]   BP Temp Temp Source Pulse Resp SpO2 Height Weight   (!) 168/101 98 °F (36.7 °C) Oral 89 16 100 % 5' 1\" (1.549 m) 175 lb (79.4 kg)       Physical Exam  General :Patient is awake, alert, oriented, in no acute distress, nontoxic appearing  HEENT: Pupils are equally round and reactive to light, EOMI. Oral mucosa is moist, no exudate. No pharyngeal erythema. Neck: Neck is supple, full range of motion  Cardiac: Heart regular rate, rhythm, no murmurs, rubs, or gallops  Lungs: Lungs are clear to auscultation, there is no wheezing, rhonchi, or rales. Chest wall:  There is no tenderness to palpation over the chest wall or over ribs  Abdomen: Abdomen is soft, nontender, nondistended. There is no firm or pulsatile masses, no rebound, rigidity or guarding. Musculoskeletal: 5 out of 5 strength in all 4 extremities. No focal muscle deficits are appreciated  Back: No midline or bony tenderness. No CVAT. Neuro: Motor intact, sensory intact, level of consciousness is normal.  Dermatology: Skin is warm and dry  Psych: Mentation is grossly normal,cognition is grossly normal. Affect is appropriate. DIAGNOSTIC RESULTS     EKG: All EKG's are interpreted by [facility] who either signs or Co-signs this chart in the absence of a cardiologist.    Sinus arrhythmia  Low voltage  NSST changes    RADIOLOGY:   Non-plain film images such as CT, Ultrasound and MRI are read by the radiologist. Plain radiographic images are visualized and preliminarily interpreted by the emergency physician with the below findings:      []Radiologist's Report Reviewed:  XR CHEST PORTABLE   Final Result   1. No acute cardiopulmonary abnormalities. CT HEAD WO CONTRAST   Final Result   1.  Normal brain            ED BEDSIDE ULTRASOUND:   Performed by ED Physician - none    LABS:  Labs Reviewed   CBC WITH AUTO DIFFERENTIAL - Abnormal; Notable for the following components:       Result Value    MCH 25.4 (*)     MCHC 30.6 (*)     Lymphocytes Absolute 1.2 (*)     All other components within normal limits    Narrative:     Performed at:  [address] [place] [address] Laboratory  [address] [place] [address], [place] [address]   Phone [phone]   COMPREHENSIVE METABOLIC PANEL - Abnormal; Notable for the following components:    Potassium 3.2 (*)     Chloride 109 (*)     Glucose 108 (*)     Total Bilirubin <0.2 (*)     All other components within normal limits    Narrative:     Performed at:  [address] [place] [address] Laboratory  [address] [place] [address],  [place], [place] [address]   Phone [phone] Narrative:     Performed at:  [address] [facility]  [address] [place], [place] [address], [place] [address]   Phone [phone]   DRUG SCREEN MULTI URINE    Narrative:     Performed at:  [address] [facility]  [address] [place], [place] [address], [place] [address]   Phone [phone]   TROPONIN    Narrative:     Performed at:  [address] [place] [address] Laboratory  [address] [place], [place] [address], [place] [address]   Phone [phone]   PREGNANCY, URINE    Narrative:     Performed at:  [facility] Laboratory  [address] [place], [place] [address], [place] [address]   Phone [phone]       I have reviewed and interpreted all ofthe currently available lab results from this visit (if applicable):  Results for orders placed or performed during the hospital encounter of 03/22/21   Brain Natriuretic Peptide   Result Value Ref Range    Pro- 0 - 900 pg/mL   CBC Auto Differential   Result Value Ref Range    WBC 5.8 4.0 - 11.0 K/uL    RBC 4.77 3.80 - 5.80 M/uL    Hemoglobin 12.1 11.5 - 16.5 g/dL    Hematocrit 39.5 37.0 - 47.0 %    MCV 82.8 80.0 - 100.0 fL    MCH 25.4 (L) 27.0 - 32.0 pg    MCHC 30.6 (L) 31.0 - 35.0 g/dL    Platelets 309 445 - 372 K/uL    MPV 9.6 6.0 - 10.0 fL    Neutrophils % 71.9 %    Immature Granulocytes % 0.2 0.0 - 5.0 %    Lymphocytes % 21.0 %    Monocytes % 4.5 %    Eosinophils % 1.9 %    Basophils % 0.5 %    Neutrophils Absolute 4.2 2.0 - 7.5 K/uL    Immature Granulocytes # 0.0 K/uL    Lymphocytes Absolute 1.2 (L) 1.5 - 4.0 K/uL    Monocytes Absolute 0.3 0.2 - 0.8 K/uL    Eosinophils Absolute 0.1 0.0 - 0.4 K/uL    Basophils Absolute 0.0 0.0 - 0.1 K/uL   Comprehensive Metabolic Panel   Result Value Ref Range    Sodium 140 136 - 145 mmol/L    Potassium 3.2 (L) 3.4 - 5.1 mmol/L    Chloride 109 (H) 98 - 107 mmol/L    CO2 21 20 - 30 mmol/L    Anion Gap 10 3 - 16    Glucose 108 (H) 74 - 106 mg/dL    BUN 7 6 - 20 mg/dL CREATININE 0.8 0.4 - 1.2 mg/dL    GFR Non-African American >60 >59    GFR African American >59 >59    Calcium 9.1 8.5 - 10.5 mg/dL    Total Protein 7.2 6.4 - 8.3 g/dL    Albumin 4.1 3.4 - 4.8 g/dL    Albumin/Globulin Ratio 1.3 0.8 - 2.0    Total Bilirubin <0.2 (L) 0.3 - 1.2 mg/dL    Alkaline Phosphatase 94 25 - 100 U/L    ALT 10 4 - 36 U/L    AST 16 8 - 33 U/L    Globulin 3.1 g/dL   Drug Screen, Multiple, Urine   Result Value Ref Range    PCP Screen, Urine Neg Negative <25 ng/mL    Benzodiazepine Screen, Urine Neg Negative <300 ng/mL    Cocaine Metabolite Screen, Urine Neg Negative <300 ng/mL    Amphetamine Screen, Urine Neg Negative <1000 ng/mL    Cannabinoid Scrn, Ur Neg Negative <50 ng/mL    Opiate Scrn, Ur Neg Negative <300 ng/mL    Barbiturate Screen, Ur Neg Negative <108 ng/mL    Tricyclic Neg Negative <896 ng/mL    Methadone Screen, Urine Neg Negative <300 ng/ml    Propoxyphene Screen, Urine Neg Negative <300 ng/mL    Methamphetamine, Urine Neg Negative <1000 ng/mL    UR Oxycodone Rapid Screen Neg Negative <100 ng/mL    Drug Screen Comment: see below    Troponin   Result Value Ref Range    Troponin <0.30 <0.30 ng/mL   Pregnancy, Urine   Result Value Ref Range    HCG(Urine) Pregnancy Test Negative Detects HCG level >20 MIU/mL        All other labs were within normal range or not returned as of this dictation. EMERGENCY DEPARTMENT COURSE and DIFFERENTIAL DIAGNOSIS/MDM:   Vitals:  Vitals:    03/22/21 2101   BP: (!) 168/101   Pulse: 89   Resp: 16   Temp: 98 °F (36.7 °C)   TempSrc: Oral   SpO2: 100%   Weight: 175 lb (79.4 kg)   Height: 5' 1\" (1.549 m)       She had a Eliazar-en-Y about 6 years ago. She's had problems with chronic anemia requiring iron infusions but denies dehydration or need for IVF's. She drinks a lot of water every day. 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 Her aunt and grandmother both have epilepsy.       The patient will follow-up with their PCP in 1-2 days for reevaluation. If the patient or family members have any further concerns or any worsening symptoms they will return to the ED for reevaluation. CONSULTS:  None    PROCEDURES:  Procedures    CRITICAL CARE TIME    Total Critical Care time was 0 minutes, excluding separately reportable procedures. There was a high probability of clinically significant/life threatening deterioration in the patient's condition which required my urgent intervention. FINAL IMPRESSION      1. Syncope and collapse    2. Hypokalemia          DISPOSITION/PLAN   DISPOSITION Decision To Discharge 03/22/2021 10:54:07 PM      PATIENT REFERRED TO:  ERICA Cao  [facility]  [phone]            DISCHARGE MEDICATIONS:  New Prescriptions    No medications on file       Comment: Please note this report has been produced using speech recognition software and may contain errorsrelated to that system including errors in grammar, punctuation, and spelling, as well as words and phrases that may be inappropriate. If there are any questions or concerns please feel free to contact the dictating providerfor clarification.     Douglas Ying MD  Attending Emergency Physician                Douglas Ying MD  03/22/21 3363

## 2021-03-24 ENCOUNTER — TELEPHONE (OUTPATIENT)
Dept: PRIMARY CARE CLINIC | Age: 41
End: 2021-03-24

## 2021-03-24 ENCOUNTER — HOSPITAL ENCOUNTER (OUTPATIENT)
Facility: HOSPITAL | Age: 41
Discharge: HOME | End: 2021-03-24
Payer: COMMERCIAL

## 2021-03-24 ENCOUNTER — HOSPITAL ENCOUNTER (OUTPATIENT)
Dept: NEUROLOGY | Facility: HOSPITAL | Age: 41
Discharge: HOME | End: 2021-03-24
Payer: COMMERCIAL

## 2021-03-24 DIAGNOSIS — Z11.52 ENCOUNTER FOR SCREENING FOR COVID-19: ICD-10-CM

## 2021-03-24 DIAGNOSIS — R40.20 LOC (LOSS OF CONSCIOUSNESS) (HCC): ICD-10-CM

## 2021-03-24 DIAGNOSIS — Z11.52 ENCOUNTER FOR SCREENING FOR COVID-19: Primary | ICD-10-CM

## 2021-03-24 LAB — SARS-COV-2, NAAT: NOT DETECTED

## 2021-03-24 PROCEDURE — 95816 EEG AWAKE AND DROWSY: CPT | Performed by: PSYCHIATRY & NEUROLOGY

## 2021-03-24 PROCEDURE — 95816 EEG AWAKE AND DROWSY: CPT

## 2021-03-24 PROCEDURE — 87635 SARS-COV-2 COVID-19 AMP PRB: CPT

## 2021-03-24 NOTE — TELEPHONE ENCOUNTER
Pt had EEG done. They informed her that her results will probably not be correct due to her taking the medication up until she had EEG done and not stopping them 3 days prior. She was unsure what to do , they told her to reach out to PCP. She also staes the Keppra has helped her tremendously.

## 2021-03-24 NOTE — PROCEDURES
ADULT ELECTROENCEPHALOGRAM REPORT    Patient:   Ag Saucedo  MR#:    [id number]  YOB: 1980  Date of Evaluation:  3/24/2021  Primary Physician:     ERICA Nguyen   Referring Physician:   ERICA Nguyen    CLINICAL INFORMATION:     This patient is a 45 y.o. female with a history of syncope. MEDICATIONS:     See MAR. RECORDING CONDITIONS:     This EEG was performed utilizing standard International 10-20 System of electrode placement, with additional channels monitored for eye movement. One channel electrocardiogram was monitored. Data was obtained, stored, and interpreted according to ACNS guidelines (J Clin Neurophysiol 2006;23(2):) utilizing referential montage recording, with reformatting to longitudinal, transverse bipolar, and referential montages as necessary for interpretation, along with the digital/automated EEG analysis. Patient tolerated entire procedure well. Photic stimulation and hyperventilation were utilized as activation procedures unless otherwise specified below. E.E.G. DESCRIPTION:     The resting predominant posterior background frequency is a 9-10 Hz 30-40 uV rhythm. No overt focal, lateralizing, or paroxysmal abnormalities were noted through the recording. Drowsiness and sleep were not demonstrated. Hyperventilation was performed and had little change on the recording. Photic stimulation was performed and had little change on the recording. Muscle, motion, and eye movement artifacts were noted. EEG INTERPRETATION:    Normal EEG for age in the awake state. CLINICAL CORRELATION:     The absence of epileptiform abnormalities does not preclude a clinical diagnosis of seizures.        Luther Ramos DO  Board Certified Neurologist    Date reported: 3/24/2021  Date signed: 3/24/2021

## 2021-04-09 NOTE — TELEPHONE ENCOUNTER
That is a possible side effect. She could cut the dose back to one at bedtime for a few days and see if it is still effective.

## 2021-05-11 ENCOUNTER — TELEPHONE (OUTPATIENT)
Dept: SURGERY | Facility: CLINIC | Age: 41
End: 2021-05-11

## 2021-05-11 NOTE — TELEPHONE ENCOUNTER
Patient no shoed for appointment with Dr Andrew. Called patient no answer,left message to call office and reschedule No show letter mailed to galina

## 2021-05-19 ENCOUNTER — TELEPHONE (OUTPATIENT)
Dept: PRIMARY CARE CLINIC | Age: 41
End: 2021-05-19

## 2021-05-19 NOTE — TELEPHONE ENCOUNTER
Pt called stating she has a kidney stone. Requests something be called in to Greg Ortega in [facility].  Unable to come in to be seen due to no transportation

## 2021-05-19 NOTE — TELEPHONE ENCOUNTER
She needs to be seen to be sure it is a kidney stone. She has not had any recent imaging to shows stones.

## 2021-06-01 ENCOUNTER — TELEPHONE (OUTPATIENT)
Dept: PRIMARY CARE CLINIC | Age: 41
End: 2021-06-01

## 2021-06-01 NOTE — TELEPHONE ENCOUNTER
Pt had death in family, could not accommodate reschedule for appt back in town after second week of June.

## 2021-06-16 ENCOUNTER — VIRTUAL VISIT (OUTPATIENT)
Dept: PRIMARY CARE CLINIC | Age: 41
End: 2021-06-16
Payer: COMMERCIAL

## 2021-06-16 DIAGNOSIS — J45.20 MILD INTERMITTENT ASTHMA, UNSPECIFIED WHETHER COMPLICATED: ICD-10-CM

## 2021-06-16 DIAGNOSIS — J01.40 ACUTE PANSINUSITIS, RECURRENCE NOT SPECIFIED: Primary | ICD-10-CM

## 2021-06-16 DIAGNOSIS — B00.1 FEVER BLISTER: ICD-10-CM

## 2021-06-16 PROCEDURE — G8427 DOCREV CUR MEDS BY ELIG CLIN: HCPCS | Performed by: NURSE PRACTITIONER

## 2021-06-16 PROCEDURE — 99212 OFFICE O/P EST SF 10 MIN: CPT | Performed by: NURSE PRACTITIONER

## 2021-06-16 RX ORDER — PREDNISONE 10 MG/1
10 TABLET ORAL 2 TIMES DAILY
Qty: 10 TABLET | Refills: 0 | Status: SHIPPED | OUTPATIENT
Start: 2021-06-16 | End: 2021-06-21

## 2021-06-16 RX ORDER — BENZONATATE 200 MG/1
200 CAPSULE ORAL 3 TIMES DAILY PRN
Qty: 30 CAPSULE | Refills: 0 | Status: SHIPPED | OUTPATIENT
Start: 2021-06-16 | End: 2021-06-23

## 2021-06-16 RX ORDER — AMOXICILLIN AND CLAVULANATE POTASSIUM 875; 125 MG/1; MG/1
1 TABLET, FILM COATED ORAL 2 TIMES DAILY
Qty: 20 TABLET | Refills: 0 | Status: SHIPPED | OUTPATIENT
Start: 2021-06-16 | End: 2021-06-26

## 2021-06-16 RX ORDER — ACYCLOVIR 800 MG/1
800 TABLET ORAL 2 TIMES DAILY
Qty: 10 TABLET | Refills: 0 | Status: SHIPPED | OUTPATIENT
Start: 2021-06-16 | End: 2021-06-21

## 2021-06-16 RX ORDER — ALBUTEROL SULFATE 90 UG/1
2 AEROSOL, METERED RESPIRATORY (INHALATION) 4 TIMES DAILY PRN
Qty: 3 INHALER | Refills: 1 | Status: SHIPPED | OUTPATIENT
Start: 2021-06-16

## 2021-06-16 RX ORDER — TOPIRAMATE 200 MG/1
CAPSULE, EXTENDED RELEASE ORAL
COMMUNITY
Start: 2021-04-29 | End: 2021-06-16

## 2021-06-16 RX ORDER — ROPINIROLE 2 MG/1
TABLET, FILM COATED, EXTENDED RELEASE ORAL
COMMUNITY
Start: 2021-05-05

## 2021-06-16 RX ORDER — FLUCONAZOLE 150 MG/1
150 TABLET ORAL
Qty: 2 TABLET | Refills: 0 | Status: SHIPPED | OUTPATIENT
Start: 2021-06-16 | End: 2021-06-22

## 2021-06-16 NOTE — PROGRESS NOTES
Chief Complaint   Patient presents with    Sinus Problem       Have you seen any other physician or provider since your last visit no    Have you had any other diagnostic tests since your last visit? no    Have you changed or stopped any medications since your last visit? no     Patient started feeling bad 4 days ago and it started with congestion. She is having some pain in right ear and a cough. She has cold sore in her nose also. Her children were sick last week and diagnosed URI and she thinks she got it from them. She has been taking some mucinex without relief.

## 2021-06-16 NOTE — PROGRESS NOTES
SUBJECTIVE:    Patient ID: Lolis Martinez is a 45 y. o.female. Chief Complaint   Patient presents with    Sinus Problem         HPI:    Lolis Martinez is a 45 y.o. female evaluated via telephone on 6/16/2021. Consent:  She and/or health care decision maker is aware that that she may receive a bill for this telephone service, depending on her insurance coverage, and has provided verbal consent to proceed: Yes  Documentation:  I communicated with the patient and/or health care decision maker about sinus issues. Details of this discussion including any medical advice provided: patient  I affirm this is a Patient Initiated Episode with a Patient who has not had a related appointment within my department in the past 7 days or scheduled within the next 24 hours. Patient identification was verified at the start of the visit: Yes  Total Time: minutes: 5-10 minutes  The visit was conducted pursuant to the emergency declaration under the [address] [place] [address], [address] [place] [address] authority and the [employer] General Act. Patient identification was verified, and a caregiver was present when appropriate. The patient was located in a state where the provider was credentialed to provide care. Note: not billable if this call serves to triage the patient into an appointment for the relevant concern    ERICA Alexander CNP       Sinus issues, congestion  Patient presents to clinic with c/o nasal and sinus congestion for the past week. Associated symptoms include frontal headache, fatigue, postnasal drainage, mild dry cough, sinus pressure. Cold sore in nose. Temp 100.2 yesterday. Hx asthma with some occasional wheezing. Denies chills, n/v/d, sick contact, SOB. Tessalon perles benefiical to patient. Symptoms worsening despite OTC meds.        Patient's medications, allergies, past medical, surgical, social and family histories were reviewed and updated as appropriate in electronic medical record. Outpatient Medications Marked as Taking for the 6/16/21 encounter (Virtual Visit) with ERICA Martinez CNP   Medication Sig Dispense Refill    rOPINIRole (REQUIP XL) 2 MG extended release tablet       levETIRAcetam (KEPPRA) 500 MG tablet Take 1 tablet by mouth 2 times daily 60 tablet 3    Cholecalciferol (VITAMIN D3) 50 MCG (2000 UT) TABS Take 2,000 Units by mouth daily 30 tablet 5    SUMAtriptan (IMITREX) 50 MG tablet Take 1 tablet by mouth daily as needed for Migraine May repeat in 2 hours 9 tablet 5    BRYSON 3-0.02 MG per tablet       Cyanocobalamin (B-12 PO) Take by mouth      Ferrous Sulfate (IRON PO) Take by mouth          Review of Systems   Constitutional: Positive for fatigue and fever. Negative for chills and diaphoresis. HENT: Positive for congestion, postnasal drip, sinus pressure, sneezing and sore throat. Negative for ear pain. Respiratory: Positive for cough and wheezing. Negative for shortness of breath and stridor. Cardiovascular: Negative. Gastrointestinal: Negative. Musculoskeletal: Negative. Allergic/Immunologic: Positive for environmental allergies. Neurological: Negative.         Past Medical History:   Diagnosis Date    Anemia     Asthma     History of seizures as a child     PCOS (polycystic ovarian syndrome)      Past Surgical History:   Procedure Laterality Date    CHOLECYSTECTOMY      GASTRIC BYPASS SURGERY      LAPAROSCOPY  2012    ND DILATION/CURETTAGE,DIAGNOSTIC  2012    D & C     Family History   Problem Relation Age of Onset    Heart Disease Father 28      Social History     Tobacco Use   Smoking Status Never Smoker   Smokeless Tobacco Never Used       OBJECTIVE:   Wt Readings from Last 3 Encounters:   03/22/21 175 lb (79.4 kg)   02/25/21 179 lb (81.2 kg)   02/03/21 178 lb (80.7 kg)     BP Readings from Last 3 Encounters:   03/23/21 134/80   02/25/21 130/70   02/19/21 126/74 There were no vitals taken for this visit. Limited PE due to telephone visit    Physical Exam  Pulmonary:      Effort: No respiratory distress. Breath sounds: No stridor. Comments: Congested cough   Neurological:      Mental Status: She is alert and oriented to person, place, and time. Psychiatric:         Mood and Affect: Mood normal.         Thought Content: Thought content normal.         Judgment: Judgment normal.         Lab Results   Component Value Date     03/22/2021    K 3.2 03/22/2021    K 3.6 01/22/2021     03/22/2021    CO2 21 03/22/2021    GLUCOSE 108 03/22/2021    BUN 7 03/22/2021    CREATININE 0.8 03/22/2021    CALCIUM 9.1 03/22/2021    PROT 7.2 03/22/2021    LABALBU 4.1 03/22/2021    BILITOT <0.2 03/22/2021    ALT 10 03/22/2021    AST 16 03/22/2021       Hemoglobin A1C (%)   Date Value   04/20/2020 5.4     Microscopic Examination (no units)   Date Value   01/22/2021 YES     LDL Calculated (mg/dL)   Date Value   04/20/2020 80         Lab Results   Component Value Date    WBC 5.8 03/22/2021    NEUTROABS 4.2 03/22/2021    HGB 12.1 03/22/2021    HCT 39.5 03/22/2021    MCV 82.8 03/22/2021     03/22/2021       Lab Results   Component Value Date    TSH 1.92 02/03/2021         ASSESSMENT/PLAN:     1. Acute pansinusitis, recurrence not specified  Take medications as prescribed. Continue home medications. Discussed symptom management. Return to clinic S&S worsen or no improvement noted. Patient verbalized understanding.     - amoxicillin-clavulanate (AUGMENTIN) 875-125 MG per tablet; Take 1 tablet by mouth 2 times daily for 10 days  Dispense: 20 tablet; Refill: 0  - fluconazole (DIFLUCAN) 150 MG tablet; Take 1 tablet by mouth every 72 hours for 6 days  Dispense: 2 tablet; Refill: 0  - benzonatate (TESSALON) 200 MG capsule; Take 1 capsule by mouth 3 times daily as needed for Cough  Dispense: 30 capsule;  Refill: 0  - albuterol sulfate HFA (VENTOLIN HFA) 108 (90 Base) MCG/ACT inhaler; Inhale 2 puffs into the lungs 4 times daily as needed for Wheezing  Dispense: 3 Inhaler; Refill: 1  - predniSONE (DELTASONE) 10 MG tablet; Take 1 tablet by mouth 2 times daily for 5 days  Dispense: 10 tablet; Refill: 0    2. Mild intermittent asthma, unspecified whether complicated  Cont home meds. Refill inhaler. Take medications as directed. RTC if needed. - albuterol sulfate HFA (VENTOLIN HFA) 108 (90 Base) MCG/ACT inhaler; Inhale 2 puffs into the lungs 4 times daily as needed for Wheezing  Dispense: 3 Inhaler; Refill: 1  - predniSONE (DELTASONE) 10 MG tablet; Take 1 tablet by mouth 2 times daily for 5 days  Dispense: 10 tablet; Refill: 0    3. Fever blister  - acyclovir (ZOVIRAX) 800 MG tablet; Take 1 tablet by mouth 2 times daily for 5 days  Dispense: 10 tablet;  Refill: 0      Orders Placed This Encounter   Medications    amoxicillin-clavulanate (AUGMENTIN) 875-125 MG per tablet     Sig: Take 1 tablet by mouth 2 times daily for 10 days     Dispense:  20 tablet     Refill:  0    fluconazole (DIFLUCAN) 150 MG tablet     Sig: Take 1 tablet by mouth every 72 hours for 6 days     Dispense:  2 tablet     Refill:  0    benzonatate (TESSALON) 200 MG capsule     Sig: Take 1 capsule by mouth 3 times daily as needed for Cough     Dispense:  30 capsule     Refill:  0    albuterol sulfate HFA (VENTOLIN HFA) 108 (90 Base) MCG/ACT inhaler     Sig: Inhale 2 puffs into the lungs 4 times daily as needed for Wheezing     Dispense:  3 Inhaler     Refill:  1    predniSONE (DELTASONE) 10 MG tablet     Sig: Take 1 tablet by mouth 2 times daily for 5 days     Dispense:  10 tablet     Refill:  0    acyclovir (ZOVIRAX) 800 MG tablet     Sig: Take 1 tablet by mouth 2 times daily for 5 days     Dispense:  10 tablet     Refill:  0

## 2021-06-23 ENCOUNTER — VIRTUAL VISIT (OUTPATIENT)
Dept: PRIMARY CARE CLINIC | Age: 41
End: 2021-06-23
Payer: COMMERCIAL

## 2021-06-23 DIAGNOSIS — G25.81 RLS (RESTLESS LEGS SYNDROME): ICD-10-CM

## 2021-06-23 DIAGNOSIS — R56.9 SEIZURE (HCC): ICD-10-CM

## 2021-06-23 DIAGNOSIS — D64.9 ANEMIA, UNSPECIFIED TYPE: Primary | ICD-10-CM

## 2021-06-23 PROCEDURE — 99213 OFFICE O/P EST LOW 20 MIN: CPT | Performed by: NURSE PRACTITIONER

## 2021-06-23 PROCEDURE — G8427 DOCREV CUR MEDS BY ELIG CLIN: HCPCS | Performed by: NURSE PRACTITIONER

## 2021-06-23 RX ORDER — PRAMIPEXOLE DIHYDROCHLORIDE 0.12 MG/1
TABLET ORAL
Qty: 90 TABLET | Refills: 5 | Status: SHIPPED | OUTPATIENT
Start: 2021-06-23

## 2021-06-23 NOTE — PROGRESS NOTES
Negative for abdominal pain, nausea and vomiting. Genitourinary: Negative for dysuria and hematuria. Musculoskeletal: Negative for joint swelling. Skin: Negative for rash. Neurological: Positive for seizures. Negative for dizziness and weakness. Psychiatric/Behavioral: Negative for sleep disturbance. OBJECTIVE:  There were no vitals taken for this visit. Physical Exam  Pulmonary:      Effort: Pulmonary effort is normal.   Neurological:      Mental Status: She is alert and oriented to person, place, and time.          Results in Past 30 Days  Result Component Current Result Ref Range Previous Result Ref Range   Albumin/Globulin Ratio 1.4 (6/28/2021) 0.8 - 2.0 Not in Time Range    Albumin 3.9 (6/28/2021) 3.4 - 4.8 g/dL Not in Time Range    Alkaline Phosphatase 89 (6/28/2021) 25 - 100 U/L Not in Time Range    ALT 8 (6/28/2021) 4 - 36 U/L Not in Time Range    AST 15 (6/28/2021) 8 - 33 U/L Not in Time Range    BUN 7 (6/28/2021) 6 - 20 mg/dL Not in Time Range    Calcium 8.8 (6/28/2021) 8.5 - 10.5 mg/dL Not in Time Range    Chloride 110 (H) (6/28/2021) 98 - 107 mmol/L Not in Time Range    CO2 23 (6/28/2021) 20 - 30 mmol/L Not in Time Range    CREATININE 0.7 (6/28/2021) 0.4 - 1.2 mg/dL Not in Time Range    GFR  >59 (6/28/2021) >59 Not in Time Range    GFR Non- >60 (6/28/2021) >59 Not in Time Range    Globulin 2.8 (6/28/2021) g/dL Not in Time Range    Glucose 89 (6/28/2021) 74 - 106 mg/dL Not in Time Range    Potassium 3.7 (6/28/2021) 3.4 - 5.1 mmol/L Not in Time Range    Sodium 144 (6/28/2021) 136 - 145 mmol/L Not in Time Range    Total Bilirubin <0.2 (L) (6/28/2021) 0.3 - 1.2 mg/dL Not in Time Range    Total Protein 6.7 (6/28/2021) 6.4 - 8.3 g/dL Not in Time Range      Hemoglobin A1C (%)   Date Value   04/20/2020 5.4     Microscopic Examination (no units)   Date Value   01/22/2021 YES     LDL Calculated (mg/dL)   Date Value   04/20/2020 80       Lab Results   Component Value Date    WBC 4.3 2021    NEUTROABS 2.2 2021    HGB 13.3 2021    HCT 42.4 2021    MCV 95.3 2021     2021     Lab Results   Component Value Date    TSH 1.92 2021       Prior to Visit Medications    Medication Sig Taking? Authorizing Provider   Pramipexole Dihydrochloride (MIRAPEX PO) Take by mouth nightly Yes Historical Provider, MD   pramipexole (MIRAPEX) 0.125 MG tablet 1-AM, 2-PM Yes ERICA Downing   albuterol sulfate HFA (VENTOLIN HFA) 108 (90 Base) MCG/ACT inhaler Inhale 2 puffs into the lungs 4 times daily as needed for Wheezing Yes ERICA Singh - CNP   levETIRAcetam (KEPPRA) 500 MG tablet Take 1 tablet by mouth 2 times daily  Patient taking differently: Take 500 mg by mouth 3 times daily  Yes ERICA Downing   Cholecalciferol (VITAMIN D3) 50 MCG (2000 UT) TABS Take 2,000 Units by mouth daily Yes ERICA Downing   SUMAtriptan (IMITREX) 50 MG tablet Take 1 tablet by mouth daily as needed for Migraine May repeat in 2 hours Yes ERICA Downing   Fan Salines 3-0.02 MG per tablet  Yes Historical Provider, MD   Cyanocobalamin (B-12 PO) Take by mouth Yes Historical Provider, MD   Ferrous Sulfate (IRON PO) Take by mouth Yes Historical Provider, MD   rOPINIRole (REQUIP XL) 2 MG extended release tablet   Historical Provider, MD       ASSESSMENT:  1. Anemia, unspecified type    2. Seizure ([facility] [address]    3.  RLS (restless legs syndrome)        PLAN:  Orders Placed This Encounter   Medications    pramipexole (MIRAPEX) 0.125 MG tablet     Si-AM, 2-PM     Dispense:  90 tablet     Refill:  5     Orders Placed This Encounter   Procedures    CBC WITH AUTO DIFFERENTIAL    IRON AND TIBC    FERRITIN    COMPREHENSIVE METABOLIC PANEL     Patient Instructions   The medication list included in this document is our record of what you are currently taking, including any changes that were made at today's visit.  If you find any differences when compared to your medications at home, or have any questions that were not answered at your visit, please contact the office. · Keep a list of your medicines with you. List all of the prescription medicines, nonprescription medicines, supplements, natural remedies, and vitamins that you take. Tell your healthcare providers who treat you about all of the products you are taking. Your provider can provide you with a form to keep track of them. Just ask. · Follow the directions that come with your medicine, including information about food or alcohol. Make sure you know how and when to take your medicine. Do not take more or less than you are supposed to take. · Keep all medicines out of the reach of children. · Store medicines according to the directions on the label. · Monitor yourself. Learn to know how your body reacts to your new medicine and keep track of how it makes you feel before attempting (If your provider has allowed you to do so) to drive or go to work. · Seek emergency medical attention if you think you have used too much of this medicine. An overdose of any prescription medicine can be fatal. Overdose symptoms may include extreme drowsiness, muscle weakness, confusion, cold and clammy skin, pinpoint pupils, shallow breathing, slow heart rate, fainting, or coma. · Don't share prescription medicines with others, even when they seem to have the same symptoms. What may be good for you may be harmful to others. · If you are no longer taking a prescribed medication and you have pills left please take your pills out of their original containers. Mix crushed pills with an undesirable substance, such as cat litter or used coffee grounds. Put the mixture into a disposable container with a lid, such as an empty margarine tub, or into a sealable bag. Cover up or remove any of your personal information on the empty containers by covering it with black permanent marker or duct tape.   Place the sealed container with the mixture, and the empty drug containers, in the trash. · If you use a medication that is in the form of a patch, dispose of used patches by folding them in half so that the sticky sides meet, and then flushing them down a toilet. They should not be placed in the household trash where children or pets can find them. · If you have any questions, ask your provider or pharmacist for more information. · Be sure to keep all appointments for provider visits or tests. We are committed to providing you with the best care possible. In order to help us achieve these goals please remember to bring all medications, herbal products, and over the counter supplements with you to each visit. If your provider has ordered testing for you, please be sure to follow up with our office if you have not received results within 7 days after the testing took place. *If you receive a survey after visiting one of our offices, please take time to share your experience concerning your physician office visit. These surveys are confidential and no health information about you is shared. We are eager to improve for you and we are counting on your feedback to help make that happen. No follow-ups on file. Keyanna Carlson is a 45 y.o. female being evaluated by a Virtual Visit (video visit) encounter to address concerns as mentioned above. A caregiver was present when appropriate. Due to this being a TeleHealth encounter (During [facility] public health emergency), evaluation of the following organ systems was limited: Vitals/Constitutional/EENT/Resp/CV/GI//MS/Neuro/Skin/Heme-Lymph-Imm.   Pursuant to the emergency declaration under the [address] [place] [address], [address] [place] [address] [facility] authority and the [employer] General Act, this Virtual Visit was conducted with patient's (and/or legal guardian's) consent, to reduce the patient's risk of exposure to COVID-19 and provide necessary medical care. The patient (and/or legal guardian) has also been advised to contact this office for worsening conditions or problems, and seek emergency medical treatment and/or call 911 if deemed necessary. Services were provided through a video synchronous discussion virtually to substitute for in-person clinic visit. Patient and provider were located at their individual homes. --ERICA Johnson on 6/30/2021 at 10:04 AM    An electronic signature was used to authenticate this note.

## 2021-06-28 ENCOUNTER — HOSPITAL ENCOUNTER (OUTPATIENT)
Facility: HOSPITAL | Age: 41
Discharge: HOME | End: 2021-06-28
Payer: COMMERCIAL

## 2021-06-28 DIAGNOSIS — D64.9 ANEMIA, UNSPECIFIED TYPE: ICD-10-CM

## 2021-06-28 DIAGNOSIS — R56.9 SEIZURE (HCC): ICD-10-CM

## 2021-06-28 LAB
A/G RATIO: 1.4 (ref 0.8–2)
ALBUMIN SERPL-MCNC: 3.9 G/DL (ref 3.4–4.8)
ALP BLD-CCNC: 89 U/L (ref 25–100)
ALT SERPL-CCNC: 8 U/L (ref 4–36)
ANION GAP SERPL CALCULATED.3IONS-SCNC: 11 MMOL/L (ref 3–16)
AST SERPL-CCNC: 15 U/L (ref 8–33)
BASOPHILS ABSOLUTE: 0.1 K/UL (ref 0–0.1)
BASOPHILS RELATIVE PERCENT: 1.2 %
BILIRUB SERPL-MCNC: <0.2 MG/DL (ref 0.3–1.2)
BUN BLDV-MCNC: 7 MG/DL (ref 6–20)
CALCIUM SERPL-MCNC: 8.8 MG/DL (ref 8.5–10.5)
CHLORIDE BLD-SCNC: 110 MMOL/L (ref 98–107)
CO2: 23 MMOL/L (ref 20–30)
CREAT SERPL-MCNC: 0.7 MG/DL (ref 0.4–1.2)
EOSINOPHILS ABSOLUTE: 0.2 K/UL (ref 0–0.4)
EOSINOPHILS RELATIVE PERCENT: 4.2 %
FERRITIN: 53.6 NG/ML (ref 22–322)
GFR AFRICAN AMERICAN: >59
GFR NON-AFRICAN AMERICAN: >60
GLOBULIN: 2.8 G/DL
GLUCOSE BLD-MCNC: 89 MG/DL (ref 74–106)
HCT VFR BLD CALC: 42.4 % (ref 37–47)
HEMOGLOBIN: 13.3 G/DL (ref 11.5–16.5)
IMMATURE GRANULOCYTES #: 0 K/UL
IMMATURE GRANULOCYTES %: 0.2 % (ref 0–5)
IRON SATURATION: 27 % (ref 15–50)
IRON: 66 UG/DL (ref 37–145)
LYMPHOCYTES ABSOLUTE: 1.6 K/UL (ref 1.5–4)
LYMPHOCYTES RELATIVE PERCENT: 36.8 %
MCH RBC QN AUTO: 29.9 PG (ref 27–32)
MCHC RBC AUTO-ENTMCNC: 31.4 G/DL (ref 31–35)
MCV RBC AUTO: 95.3 FL (ref 80–100)
MONOCYTES ABSOLUTE: 0.3 K/UL (ref 0.2–0.8)
MONOCYTES RELATIVE PERCENT: 6.3 %
NEUTROPHILS ABSOLUTE: 2.2 K/UL (ref 2–7.5)
NEUTROPHILS RELATIVE PERCENT: 51.3 %
PDW BLD-RTO: 15.2 % (ref 11–16)
PLATELET # BLD: 285 K/UL (ref 150–400)
PMV BLD AUTO: 9.9 FL (ref 6–10)
POTASSIUM SERPL-SCNC: 3.7 MMOL/L (ref 3.4–5.1)
RBC # BLD: 4.45 M/UL (ref 3.8–5.8)
SODIUM BLD-SCNC: 144 MMOL/L (ref 136–145)
TOTAL IRON BINDING CAPACITY: 245 UG/DL (ref 250–450)
TOTAL PROTEIN: 6.7 G/DL (ref 6.4–8.3)
WBC # BLD: 4.3 K/UL (ref 4–11)

## 2021-06-28 PROCEDURE — 83540 ASSAY OF IRON: CPT

## 2021-06-28 PROCEDURE — 82728 ASSAY OF FERRITIN: CPT

## 2021-06-28 PROCEDURE — 85025 COMPLETE CBC W/AUTO DIFF WBC: CPT

## 2021-06-28 PROCEDURE — 83550 IRON BINDING TEST: CPT

## 2021-06-28 PROCEDURE — 80053 COMPREHEN METABOLIC PANEL: CPT

## 2021-06-30 ASSESSMENT — ENCOUNTER SYMPTOMS
EYE PAIN: 0
COUGH: 0
NAUSEA: 0
SORE THROAT: 0
SHORTNESS OF BREATH: 0
ABDOMINAL PAIN: 0
VOMITING: 0

## 2021-07-07 ENCOUNTER — TELEPHONE (OUTPATIENT)
Dept: PRIMARY CARE CLINIC | Age: 41
End: 2021-07-07

## 2021-07-07 RX ORDER — PROMETHAZINE HYDROCHLORIDE 25 MG/1
25 TABLET ORAL EVERY 6 HOURS PRN
Qty: 30 TABLET | Refills: 0 | Status: SHIPPED | OUTPATIENT
Start: 2021-07-07 | End: 2021-07-14

## 2021-07-07 NOTE — TELEPHONE ENCOUNTER
Pt called stating she was just released from [facility] yesterday after having 2 seizures and possible stroke, also positive for COVID19. [place] [address].  Jean Collins

## 2021-07-10 ASSESSMENT — ENCOUNTER SYMPTOMS
SINUS PRESSURE: 1
STRIDOR: 0
COUGH: 1
SORE THROAT: 1
SHORTNESS OF BREATH: 0
WHEEZING: 1
GASTROINTESTINAL NEGATIVE: 1

## 2021-07-23 ENCOUNTER — TELEPHONE (OUTPATIENT)
Dept: PRIMARY CARE CLINIC | Age: 41
End: 2021-07-23

## 2021-07-23 NOTE — TELEPHONE ENCOUNTER
Patient called and she stated we would get a form to fill out for her to have some test to determine her vertigo. She stated it was from [facility] [place].  They needed it signed by Cailin Mena?

## 2021-07-27 NOTE — TELEPHONE ENCOUNTER
Informed pt we have not seen the forms, states she will call the audiologist and verify they have correct fax #

## 2021-07-28 ENCOUNTER — TELEPHONE (OUTPATIENT)
Dept: PRIMARY CARE CLINIC | Age: 41
End: 2021-07-28

## 2021-07-28 DIAGNOSIS — R42 VERTIGO: Primary | ICD-10-CM

## 2021-07-28 NOTE — TELEPHONE ENCOUNTER
Pt called to request a referral to ENT for Vertigo. She was seen by Neurology; they want her to go to ENT but she needs a referral due to her insurance.

## 2021-08-13 ENCOUNTER — VIRTUAL VISIT (OUTPATIENT)
Dept: PRIMARY CARE CLINIC | Age: 41
End: 2021-08-13
Payer: COMMERCIAL

## 2021-08-13 DIAGNOSIS — R09.89 SUSPECTED CEREBROVASCULAR ACCIDENT (CVA): ICD-10-CM

## 2021-08-13 DIAGNOSIS — G83.84 TODD'S PARALYSIS (HCC): ICD-10-CM

## 2021-08-13 DIAGNOSIS — R00.1 BRADYCARDIA: Primary | ICD-10-CM

## 2021-08-13 DIAGNOSIS — R56.9 SEIZURE (HCC): ICD-10-CM

## 2021-08-13 PROCEDURE — G8427 DOCREV CUR MEDS BY ELIG CLIN: HCPCS | Performed by: NURSE PRACTITIONER

## 2021-08-13 PROCEDURE — 99213 OFFICE O/P EST LOW 20 MIN: CPT | Performed by: NURSE PRACTITIONER

## 2021-08-13 RX ORDER — TOPIRAMATE 200 MG/1
200 CAPSULE, EXTENDED RELEASE ORAL DAILY
COMMUNITY

## 2021-08-13 RX ORDER — LACOSAMIDE 150 MG/1
TABLET, FILM COATED ORAL
COMMUNITY
Start: 2021-08-08

## 2021-08-13 NOTE — PROGRESS NOTES
Chief Complaint   Patient presents with    Referral - General     Cardiology     Pt here today requesting referral to Cardiology, lives in [place]. Rahul would like something close. Was positive for Covid when in [facility] July 4th     Have you seen any other physician or provider since your last visit yes - [facility]    Have you had any other diagnostic tests since your last visit?  Ct, labs     Have you changed or stopped any medications since your last visit? no

## 2021-09-14 ASSESSMENT — ENCOUNTER SYMPTOMS
EYE PAIN: 0
SHORTNESS OF BREATH: 0
ABDOMINAL PAIN: 0
COUGH: 0
VOMITING: 0
SORE THROAT: 0
NAUSEA: 0

## 2021-09-14 NOTE — PROGRESS NOTES
Exam  Pulmonary:      Effort: Pulmonary effort is normal.   Neurological:      Mental Status: She is alert and oriented to person, place, and time. No results found for requested labs within last 30 days. Hemoglobin A1C (%)   Date Value   04/20/2020 5.4     Microscopic Examination (no units)   Date Value   01/22/2021 YES     LDL Calculated (mg/dL)   Date Value   04/20/2020 80       Lab Results   Component Value Date    WBC 4.3 06/28/2021    NEUTROABS 2.2 06/28/2021    HGB 13.3 06/28/2021    HCT 42.4 06/28/2021    MCV 95.3 06/28/2021     06/28/2021     Lab Results   Component Value Date    TSH 1.92 02/03/2021       Prior to Visit Medications    Medication Sig Taking? Authorizing Provider   VIMPAT 150 MG TABS tablet  Yes Historical Provider, MD   pramipexole (MIRAPEX) 0.125 MG tablet 1-AM, 2-PM Yes ERICA Li   albuterol sulfate HFA (VENTOLIN HFA) 108 (90 Base) MCG/ACT inhaler Inhale 2 puffs into the lungs 4 times daily as needed for Wheezing Yes ERICA Grace - CNP   Cholecalciferol (VITAMIN D3) 50 MCG (2000 UT) TABS Take 2,000 Units by mouth daily Yes ERICA Li   SUMAtriptan (IMITREX) 50 MG tablet Take 1 tablet by mouth daily as needed for Migraine May repeat in 2 hours Yes ERICA Li   Arletha Du 3-0.02 MG per tablet  Yes Historical Provider, MD   Cyanocobalamin (B-12 PO) Take by mouth Yes Historical Provider, MD   Ferrous Sulfate (IRON PO) Take by mouth Yes Historical Provider, MD   topiramate ER (TROKENDI XR) 200 MG CP24 Take 200 mg by mouth daily  Patient not taking: Reported on 8/13/2021  Historical Provider, MD   rOPINIRole (REQUIP XL) 2 MG extended release tablet   Historical Provider, MD   levETIRAcetam (KEPPRA) 500 MG tablet Take 1 tablet by mouth 2 times daily  Patient not taking: Reported on 8/13/2021  ERICA Li       ASSESSMENT:  1. Bradycardia    2. Seizure ([address]    3. Suspected cerebrovascular accident (CVA)    4.  Derek's paralysis ([facility] [address] PLAN:  No orders of the defined types were placed in this encounter. Orders Placed This Encounter   Procedures    Amb External Referral To Cardiology     There are no Patient Instructions on file for this visit. Return in about 4 weeks (around 9/10/2021). Bennett Jones is a 37 y.o. female being evaluated by a Virtual Visit (video visit) encounter to address concerns as mentioned above. A caregiver was present when appropriate. Due to this being a TeleHealth encounter (During [facility]-27 public health emergency), evaluation of the following organ systems was limited: Vitals/Constitutional/EENT/Resp/CV/GI//MS/Neuro/Skin/Heme-Lymph-Imm. Pursuant to the emergency declaration under the [address] [place] [address] authority and the [employer] Act, this Virtual Visit was conducted with patient's (and/or legal guardian's) consent, to reduce the patient's risk of exposure to COVID-19 and provide necessary medical care. The patient (and/or legal guardian) has also been advised to contact this office for worsening conditions or problems, and seek emergency medical treatment and/or call 911 if deemed necessary. Services were provided through a video synchronous discussion virtually to substitute for in-person clinic visit. Patient and provider were located at their individual homes. --ERICA Tate on 9/14/2021 at 1:30 AM    An electronic signature was used to authenticate this note.

## 2021-09-22 ENCOUNTER — OFFICE VISIT (OUTPATIENT)
Dept: PRIMARY CARE CLINIC | Age: 41
End: 2021-09-22
Payer: MEDICAID

## 2021-09-22 VITALS
DIASTOLIC BLOOD PRESSURE: 100 MMHG | SYSTOLIC BLOOD PRESSURE: 140 MMHG | OXYGEN SATURATION: 100 % | TEMPERATURE: 98.6 F | WEIGHT: 174.6 LBS | BODY MASS INDEX: 32.97 KG/M2 | HEART RATE: 89 BPM | HEIGHT: 61 IN

## 2021-09-22 DIAGNOSIS — R51.9 ACUTE INTRACTABLE HEADACHE, UNSPECIFIED HEADACHE TYPE: Primary | ICD-10-CM

## 2021-09-22 DIAGNOSIS — R56.9 SEIZURE (HCC): ICD-10-CM

## 2021-09-22 DIAGNOSIS — R03.0 ELEVATED BP WITHOUT DIAGNOSIS OF HYPERTENSION: ICD-10-CM

## 2021-09-22 PROCEDURE — G8417 CALC BMI ABV UP PARAM F/U: HCPCS | Performed by: NURSE PRACTITIONER

## 2021-09-22 PROCEDURE — 99213 OFFICE O/P EST LOW 20 MIN: CPT | Performed by: NURSE PRACTITIONER

## 2021-09-22 PROCEDURE — G8427 DOCREV CUR MEDS BY ELIG CLIN: HCPCS | Performed by: NURSE PRACTITIONER

## 2021-09-22 PROCEDURE — 1036F TOBACCO NON-USER: CPT | Performed by: NURSE PRACTITIONER

## 2021-09-22 RX ORDER — AMITRIPTYLINE HYDROCHLORIDE 10 MG/1
TABLET, FILM COATED ORAL
COMMUNITY
Start: 2021-09-14

## 2021-09-22 RX ORDER — PROMETHAZINE HYDROCHLORIDE 25 MG/1
25 TABLET ORAL EVERY 6 HOURS PRN
Qty: 30 TABLET | Refills: 5 | Status: SHIPPED | OUTPATIENT
Start: 2021-09-22 | End: 2021-09-29

## 2021-09-22 RX ORDER — CLONIDINE HYDROCHLORIDE 0.1 MG/1
0.1 TABLET ORAL 2 TIMES DAILY PRN
Qty: 60 TABLET | Refills: 2 | Status: SHIPPED | OUTPATIENT
Start: 2021-09-22

## 2021-09-22 RX ORDER — ACETAMINOPHEN AND CODEINE PHOSPHATE 300; 30 MG/1; MG/1
1 TABLET ORAL EVERY 4 HOURS PRN
Qty: 18 TABLET | Refills: 0 | Status: SHIPPED | OUTPATIENT
Start: 2021-09-22 | End: 2021-09-25

## 2021-09-22 NOTE — PATIENT INSTRUCTIONS
The medication list included in this document is our record of what you are currently taking, including any changes that were made at today's visit.  If you find any differences when compared to your medications at home, or have any questions that were not answered at your visit, please contact the office. · Keep a list of your medicines with you. List all of the prescription medicines, nonprescription medicines, supplements, natural remedies, and vitamins that you take. Tell your healthcare providers who treat you about all of the products you are taking. Your provider can provide you with a form to keep track of them. Just ask. · Follow the directions that come with your medicine, including information about food or alcohol. Make sure you know how and when to take your medicine. Do not take more or less than you are supposed to take. · Keep all medicines out of the reach of children. · Store medicines according to the directions on the label. · Monitor yourself. Learn to know how your body reacts to your new medicine and keep track of how it makes you feel before attempting (If your provider has allowed you to do so) to drive or go to work. · Seek emergency medical attention if you think you have used too much of this medicine. An overdose of any prescription medicine can be fatal. Overdose symptoms may include extreme drowsiness, muscle weakness, confusion, cold and clammy skin, pinpoint pupils, shallow breathing, slow heart rate, fainting, or coma. · Don't share prescription medicines with others, even when they seem to have the same symptoms. What may be good for you may be harmful to others. · If you are no longer taking a prescribed medication and you have pills left please take your pills out of their original containers. Mix crushed pills with an undesirable substance, such as cat litter or used coffee grounds.  Put the mixture into a disposable container with a lid, such as an empty margarine tub, or into a sealable bag. Cover up or remove any of your personal information on the empty containers by covering it with black permanent marker or duct tape. Place the sealed container with the mixture, and the empty drug containers, in the trash. · If you use a medication that is in the form of a patch, dispose of used patches by folding them in half so that the sticky sides meet, and then flushing them down a toilet. They should not be placed in the household trash where children or pets can find them. · If you have any questions, ask your provider or pharmacist for more information. · Be sure to keep all appointments for provider visits or tests. We are committed to providing you with the best care possible. In order to help us achieve these goals please remember to bring all medications, herbal products, and over the counter supplements with you to each visit. If your provider has ordered testing for you, please be sure to follow up with our office if you have not received results within 7 days after the testing took place. *If you receive a survey after visiting one of our offices, please take time to share your experience concerning your physician office visit. These surveys are confidential and no health information about you is shared. We are eager to improve for you and we are counting on your feedback to help make that happen. Transportation and [address]  [address]. [address] [facility] [phone]  Help with food, clothing, transportation, utilities, prescription assistance. [facility] [phone]  Senior Citizens Programs  [address] [facility],[address]  Janay Shouts, [address] [facility]    [address] [facility] Aging and Independent Living Program.  They handle meals on wheels and senior centers.    [employer] [phone] [facility] senior citizens center     [facility]   [phone] or [address] [facility] [address]             [phone]    Jarrod Urena (Benja Drought)            [phone]    [address] [facility] [phone]    [facility] senior citizens center             [address] [facility] [address]/ [address] [place] [address]              [phone]   [employer] [facility] [employer]     [phone]   [employer] and [address] (may visit once monthly      8-4:30)              [phone]    [facility] MAT[address]AS [address], food and utility assistance ([place])    Energy Assistance  [place]     [phone]     [address]   [phone]    [address]       [address] with applying for insurance coverage with Medicaid and Medicare including part D  Help with medication cost, eyeglasses or exams, hearing aides  [place]    [address] [phone] Stephani Tesfaye [facility]     [phone] Josh [facility]   (Coordinating and Assisting the Reuse of Assistive Technology)  Help with durable medical equipment and assistive technology   [place] [phone]  [employer]     [phone]    Domestic Violence Shelters   [place] Domestic Violence Hotline [phone]  [facility] [address] in [place] but services [place] and [place] [phone]  Jennifer Mathur in [facility] but services S[address] [facility] [phone]    [facility]   Emergency Shelter and [employer] [phone] [address] [phone] [facility] [employer]  [address] [facility] [address] for [facility] [address]     Available 24 hours daily in [facility] and [place]

## 2021-09-22 NOTE — PROGRESS NOTES
SUBJECTIVE:    Patient ID: Dina Rodriguez is a 37 y.o. female. Medical History Review  Past Medical, Family, and Social History reviewed. Health Maintenance Due   Topic Date Due    Hepatitis C screen  Never done    Varicella vaccine (1 of 2 - 2-dose childhood series) Never done    COVID-19 Vaccine (1) Never done    HIV screen  Never done    DTaP/Tdap/Td vaccine (1 - Tdap) Never done    Cervical cancer screen  Never done    Flu vaccine (1) Never done       HPI:   Chief Complaint   Patient presents with    Check-Up    Headache   She saw Dr. Joana Lan. She is going to get a MRI. She has some sinus drainage and HA. She was exposed to [place] on Friday. Her seizures are much better. She has not had one for 3 weeks. Her medication was changed a week ago. She tolerates the medication well. It caused drowsiness at first. Neurology started Elavil. She is having them everyday. Imitrex does not help much. Her BP is running high at home when she is in pain. Patient's medications, allergies, past medical, surgical, social and family histories were reviewed and updated as appropriate. Review of Systems   Constitutional: Negative for chills and fever. HENT: Negative for ear pain and sore throat. Eyes: Negative for pain and visual disturbance. Respiratory: Negative for cough and shortness of breath. Cardiovascular: Negative for chest pain, palpitations and leg swelling. Gastrointestinal: Negative for abdominal pain, nausea and vomiting. Genitourinary: Negative for dysuria and hematuria. Musculoskeletal: Negative for joint swelling. Skin: Negative for rash. Neurological: Positive for seizures and headaches. Negative for dizziness and weakness. Psychiatric/Behavioral: Negative for sleep disturbance. Reviewed and acurate. See MA note.     OBJECTIVE:  BP (!) 140/100   Pulse 89   Temp 98.6 °F (37 °C) (Temporal)   Ht 5' 1\" (1.549 m)   Wt 174 lb 9.6 oz (79.2 kg)   SpO2 100%   BMI 32.99 kg/m²    Physical Exam  Vitals reviewed. Constitutional:       General: She is not in acute distress. Appearance: She is well-developed. HENT:      Head: Normocephalic. Mouth/Throat:      Pharynx: No oropharyngeal exudate. Eyes:      General: Lids are normal.   Cardiovascular:      Rate and Rhythm: Normal rate and regular rhythm. Heart sounds: Normal heart sounds. Pulmonary:      Effort: Pulmonary effort is normal.      Breath sounds: Normal breath sounds. Abdominal:      General: Bowel sounds are normal. There is no distension. Palpations: Abdomen is soft. Tenderness: There is no abdominal tenderness. Musculoskeletal:      Cervical back: Neck supple. Lymphadenopathy:      Cervical: No cervical adenopathy. Skin:     General: Skin is warm and dry. Neurological:      Mental Status: She is alert and oriented to person, place, and time. Cranial Nerves: No cranial nerve deficit. No results found for requested labs within last 30 days. Hemoglobin A1C (%)   Date Value   04/20/2020 5.4     Microscopic Examination (no units)   Date Value   01/22/2021 YES     LDL Calculated (mg/dL)   Date Value   04/20/2020 80       Lab Results   Component Value Date    WBC 4.3 06/28/2021    NEUTROABS 2.2 06/28/2021    HGB 13.3 06/28/2021    HCT 42.4 06/28/2021    MCV 95.3 06/28/2021     06/28/2021     Lab Results   Component Value Date    TSH 1.92 02/03/2021       Prior to Visit Medications    Medication Sig Taking?  Authorizing Provider   amitriptyline (ELAVIL) 10 MG tablet  Yes Historical Provider, MD   cloNIDine (CATAPRES) 0.1 MG tablet Take 1 tablet by mouth 2 times daily as needed for High Blood Pressure (>150/90) Yes Napolean Current, APRN   VIMPAT 150 MG TABS tablet  Yes Historical Provider, MD   pramipexole (MIRAPEX) 0.125 MG tablet 1-AM, 2-PM Yes Napolean Current, APRN   albuterol sulfate HFA (VENTOLIN HFA) 108 (90 Base) MCG/ACT inhaler Inhale 2 puffs into the lungs 4 times daily as needed for Wheezing Yes Linda , ERICA - CNP   Cholecalciferol (VITAMIN D3) 50 MCG (2000 UT) TABS Take 2,000 Units by mouth daily Yes ERICA Miller   SUMAtriptan (IMITREX) 50 MG tablet Take 1 tablet by mouth daily as needed for Migraine May repeat in 2 hours Yes ERICA Miller   Erika Elsy 3-0.02 MG per tablet  Yes Historical Provider, MD   Cyanocobalamin (B-12 PO) Take by mouth Yes Historical Provider, MD   Ferrous Sulfate (IRON PO) Take by mouth Yes Historical Provider, MD   topiramate ER (TROKENDI XR) 200 MG CP24 Take 200 mg by mouth daily  Patient not taking: Reported on 8/13/2021  Historical Provider, MD   rOPINIRole (REQUIP XL) 2 MG extended release tablet   Historical Provider, MD   levETIRAcetam (KEPPRA) 500 MG tablet Take 1 tablet by mouth 2 times daily  Patient not taking: Reported on 8/13/2021  ERICA Miller       ASSESSMENT:  1. Acute intractable headache, unspecified headache type    2. Seizure ([facility] [address]    3. Elevated BP without diagnosis of hypertension        PLAN:  Orders Placed This Encounter   Medications    promethazine (PHENERGAN) 25 MG tablet     Sig: Take 1 tablet by mouth every 6 hours as needed for Nausea     Dispense:  30 tablet     Refill:  5    acetaminophen-codeine (TYLENOL/CODEINE #3) 300-30 MG per tablet     Sig: Take 1 tablet by mouth every 4 hours as needed for Pain for up to 3 days. Intended supply: 3 days. Take lowest dose possible to manage pain     Dispense:  18 tablet     Refill:  0     Reduce doses taken as pain becomes manageable    cloNIDine (CATAPRES) 0.1 MG tablet     Sig: Take 1 tablet by mouth 2 times daily as needed for High Blood Pressure (>150/90)     Dispense:  60 tablet     Refill:  2     No orders of the defined types were placed in this encounter.     Patient Instructions   The medication list included in this document is our record of what you are currently taking, including any changes that were made at today's visit.  If you find any differences when compared to your medications at home, or have any questions that were not answered at your visit, please contact the office. · Keep a list of your medicines with you. List all of the prescription medicines, nonprescription medicines, supplements, natural remedies, and vitamins that you take. Tell your healthcare providers who treat you about all of the products you are taking. Your provider can provide you with a form to keep track of them. Just ask. · Follow the directions that come with your medicine, including information about food or alcohol. Make sure you know how and when to take your medicine. Do not take more or less than you are supposed to take. · Keep all medicines out of the reach of children. · Store medicines according to the directions on the label. · Monitor yourself. Learn to know how your body reacts to your new medicine and keep track of how it makes you feel before attempting (If your provider has allowed you to do so) to drive or go to work. · Seek emergency medical attention if you think you have used too much of this medicine. An overdose of any prescription medicine can be fatal. Overdose symptoms may include extreme drowsiness, muscle weakness, confusion, cold and clammy skin, pinpoint pupils, shallow breathing, slow heart rate, fainting, or coma. · Don't share prescription medicines with others, even when they seem to have the same symptoms. What may be good for you may be harmful to others. · If you are no longer taking a prescribed medication and you have pills left please take your pills out of their original containers. Mix crushed pills with an undesirable substance, such as cat litter or used coffee grounds. Put the mixture into a disposable container with a lid, such as an empty margarine tub, or into a sealable bag. Cover up or remove any of your personal information on the empty containers by covering it with black permanent marker or duct tape. Place the sealed container with the mixture, and the empty drug containers, in the trash. · If you use a medication that is in the form of a patch, dispose of used patches by folding them in half so that the sticky sides meet, and then flushing them down a toilet. They should not be placed in the household trash where children or pets can find them. · If you have any questions, ask your provider or pharmacist for more information. · Be sure to keep all appointments for provider visits or tests. We are committed to providing you with the best care possible. In order to help us achieve these goals please remember to bring all medications, herbal products, and over the counter supplements with you to each visit. If your provider has ordered testing for you, please be sure to follow up with our office if you have not received results within 7 days after the testing took place. *If you receive a survey after visiting one of our offices, please take time to share your experience concerning your physician office visit. These surveys are confidential and no health information about you is shared. We are eager to improve for you and we are counting on your feedback to help make that happen. Transportation and [address]    [address] [facility] [phone]  Help with food, clothing, transportation, utilities, prescription assistance. [facility] [phone]  Senior Citizens Programs  [address] [facility]  Birdia King, [address] [facility] Ali Annabi    [address] [facility] Aging and Independent Living Program.  They handle meals on wheels and senior centers.    [employer] [phone] [facility] senior citizens center     [facility]   [phone] or [address] [facility]             [phone]    [facility]             [phone]    Helping [address] (Alec Gilman)            [phone]    [facility] Community [employer]s Wholes[employer] [phone]    [facility] senior citizens center             [address] [facility] [address]/ [address]              [phone]   [employer] [facility] [place]     [phone]   [employer] and [address] (may visit once monthly      8-4:30)              [phone]    [facility] [address], food and utility assistance ([place])    Energy Assistance  [place]     [phone]     [address]   [phone]    [address]       [address] with applying for insurance coverage with Medicaid and Medicare including part D  Help with medication cost, eyeglasses or exams, hearing aides  [place]    [address] [phone] Stephani Morales     [phone] Jenny HA   (Coordinating and Assisting the Reuse of Assistive Technology)  Help with durable medical equipment and assistive technology   [place] [phone]  [place]     [phone]    Domestic Violence Shelters   [place] Domestic Violence Hotline [phone]  [address] in [place] but services [address] and [address] in [facility] but services Lizzie Vitale [phone]    [facility]   Emergency Shelter and [employer] [phone]  [address] [place], [address] [phone] of [facility] [address]  [address] [facility] [address] for [facility] [phone]    National Suicide Prevention Lifeline [phone]     Available 24 hours daily in [place] and [place]         Return in about 2 months (around 11/22/2021).

## 2021-09-22 NOTE — PROGRESS NOTES
Health Maintenance Due This Visit   Colonoscopy No   Mammogram No   Annual Wellness Visit No   Microalbumin No   HgbA1C No   Diabetic Eye Exam No    House Bill One Due This Visit   GISELL No   UDS No   Contract No    Chief Complaint   Patient presents with    Anemia     Pt here today for check up.  C/o headaches x 1 week     Have you seen any other physician or provider since your last visit no    Have you had any other diagnostic tests since your last visit? no    Have you changed or stopped any medications since your last visit? no

## 2021-09-24 ENCOUNTER — PATIENT MESSAGE (OUTPATIENT)
Dept: PRIMARY CARE CLINIC | Age: 41
End: 2021-09-24

## 2021-09-24 RX ORDER — CEFDINIR 300 MG/1
300 CAPSULE ORAL 2 TIMES DAILY
Qty: 20 CAPSULE | Refills: 0 | Status: SHIPPED | OUTPATIENT
Start: 2021-09-24 | End: 2021-10-04

## 2021-09-24 RX ORDER — FLUCONAZOLE 150 MG/1
150 TABLET ORAL ONCE
Qty: 2 TABLET | Refills: 0 | Status: SHIPPED | OUTPATIENT
Start: 2021-09-24 | End: 2021-09-24

## 2021-09-24 NOTE — TELEPHONE ENCOUNTER
From: Ashley Briceno  To: ERICA Robles  Sent: 9/24/2021 8:32 AM EDT  Subject: Prescription Question    Good morning! Im so sorry to bug you. My congestion has now turned into a full sinus infection, I wanted to see if you could call me an antibiotic and a yeast infection medicine in? I shouldve just let you in office! It was bad until the vng test yesterday. I think it opened me up! Thanks pal. Again, sorry to bug you! Hope you have a wonderful weekend!

## 2021-10-10 ASSESSMENT — ENCOUNTER SYMPTOMS
VOMITING: 0
NAUSEA: 0
SHORTNESS OF BREATH: 0
EYE PAIN: 0
COUGH: 0
ABDOMINAL PAIN: 0
SORE THROAT: 0

## 2021-12-01 NOTE — PROGRESS NOTES
[facility]  [address], [address]  [place], [place], [address] [phone]  WWW[facility]           OUTPATIENT CLINIC CONSULTATION NOTE    Patient care team:  Patient Care Team:  Lazara Cordova DO as PCP - General (Family Medicine)    Requesting Provider and Reason for consultation: The patient is being seen today at the request of DAVID Iqbal for bradycardia.     Subjective:   Chief complaint:   Chief Complaint   Patient presents with   • Shortness of Breath   • Irregular Heart Beat   • Vomiting       HPI:    Luz Maria Andrade is a 41 y.o. female.  Partial problem list, including cardiac problems:  1. Bradycardia  2. Syncope  a. 4-5 syncopal episodes in the past year  b. Longstanding history of syncopal episodes  3. Hypertension  a. Clonidine as needed  4. Iron deficiency anemia  5. Epilepsy  a. Diagnosed in childhood  b. Typically 2-4 seizures per year    Patient presents today for consultation.    Patient with longstanding history of epilepsy and syncope.  Was diagnosed with epilepsy as a child, typically 2-4 seizures per year.  Last seizure 2021.  She was hospitalized at that time with seizure and right-sided weakness.  Underwent work-up for CVA and received TPA at .  It was not determined if patient did in fact have a CVA at that time.  She reports she was told her heart rate dropped during her admission.    Denies history of hypertension.  Was on daily clonidine for approximately 1 year.  Blood pressure normalized and she now only takes clonidine as needed.  Family history unknown in her mother, father  at age 36 from aneurysm in his heart.  Patient has had no prior cardiac testing.    Patient endorses lightheadedness with exertion.  She has had for syncopal episodes in the last year.  Notes this is not new for her, has been experiencing syncope for many years.  No notes pain in bilateral legs, right more than left.  Worse at night,  improved with Mirapex.  She denies chest pain, dyspnea on exertion, lower extremity edema.      Review of Systems:  Positive for lightheadedness, syncope, dyspnea on exertion  All other systems are reviewed and are negative    PFSH:  Patient Active Problem List   Diagnosis   • Recurrent kidney stones   • Solitary lung nodule   • Eating disorder   • S/P gastric bypass   • Iron deficiency anemia secondary to inadequate dietary iron intake   • LOC (loss of consciousness) (HCC)   • Seizure disorder (HCC)   • Dada's paralysis (HCC)   • Iron deficiency anemia   • Dyspnea on exertion   • Essential hypertension         Current Outpatient Medications:   •  amitriptyline (ELAVIL) 10 MG tablet, Take 10 mg by mouth 3 (Three) Times a Day., Disp: , Rfl:   •  Cyanocobalamin (VITAMIN B 12) 100 MCG lozenge, Take  by mouth., Disp: , Rfl:   •  lacosamide (VIMPAT) 100 MG tablet tablet, Take 200 mg by mouth 2 (Two) Times a Day., Disp: , Rfl:   •  ondansetron ODT (ZOFRAN ODT) 8 MG disintegrating tablet, Take 1 tablet by mouth Every 8 (Eight) Hours As Needed for Nausea or Vomiting., Disp: 20 tablet, Rfl: 0  •  pramipexole (MIRAPEX) 0.25 MG tablet, Take 0.25 mg by mouth 2 (Two) Times a Day., Disp: , Rfl:   •  pramipexole (MIRAPEX) 0.5 MG tablet, Take 0.5 mg by mouth Daily., Disp: , Rfl:   •  tamsulosin (FLOMAX) 0.4 MG capsule 24 hr capsule, Take 1 capsule by mouth Daily., Disp: 30 capsule, Rfl: 11  •  Topiramate ER (Trokendi XR) 200 MG capsule sustained-release 24 hr, Take 200 mg by mouth Daily., Disp: , Rfl:     No Known Allergies    Social History     Socioeconomic History   • Marital status:    Tobacco Use   • Smoking status: Never Smoker   • Smokeless tobacco: Never Used   Substance and Sexual Activity   • Alcohol use: No   • Drug use: No     Family History   Problem Relation Age of Onset   • Heart failure Father    • Bleeding Disorder Father    • Heart disease Sister    • Clotting disorder Sister          Objective:   Physical  "Exam:  /62   Pulse 81   Ht 154.9 cm (61\")   Wt 83.9 kg (185 lb)   SpO2 96%   BMI 34.96 kg/m²     CONSTITUTIONAL: No acute distress  RESPIRATORY: Normal effort. Clear to auscultation bilaterally without wheezing or rales  CARDIOVASCULAR: Regular rate and rhythm with normal S1 and S2.  Faint HYACINTH at RUSB.  Without gallop or rub.  PERIPHERAL VASCULAR: Normal radial pulse. There is lower extremity edema bilaterally.  2+ DP pulses bilaterally  PSYCH: Normal affect and mood      Labs:  BUN   Date Value Ref Range Status   02/02/2018 11 7 - 20 mg/dL Final     Creatinine   Date Value Ref Range Status   02/02/2018 0.60 0.60 - 1.30 mg/dL Final     Potassium   Date Value Ref Range Status   02/02/2018 4.1 3.5 - 5.1 mmol/L Final     ALT (SGPT)   Date Value Ref Range Status   02/02/2018 23 13 - 69 U/L Final     AST (SGOT)   Date Value Ref Range Status   02/02/2018 16 15 - 46 U/L Final     WBC   Date Value Ref Range Status   06/28/2021 4.3 4.0 - 11.0 K/uL Final     Hemoglobin   Date Value Ref Range Status   06/28/2021 13.3 11.5 - 16.5 g/dL Final     Hematocrit   Date Value Ref Range Status   06/28/2021 42.4 37.0 - 47.0 % Final     Platelets   Date Value Ref Range Status   06/28/2021 285 150 - 400 K/uL Final       No results found for: CHOL  Lab Results   Component Value Date    TRIG 63 04/20/2020    TRIG 77 05/31/2019    TRIG 72 02/02/2018     Lab Results   Component Value Date    HDL 55 04/20/2020    HDL 52 05/31/2019    HDL 59 02/02/2018     Lab Results   Component Value Date    LDL 80 04/20/2020     No components found for: LDLDIRECTC    [facility] labs 7/6/2021: WBC 4.01, hemoglobin 13.3, hematocrit 41.4, platelets 212, BUN 10, creatinine 0.78, GFR > 60, TSH 1.74, hemoglobin A1c 5.2, cholesterol 215, HDL 58, triglycerides 70, , AST 15, ALT 10    Diagnostic Data:      ECG 12 Lead    Date/Time: 12/3/2021 1:13 PM  Performed by: Venkata Arcos MD  Authorized by: Venkata Arcos MD   Comparison: not compared with previous " ECG   Rhythm: sinus rhythm  Rate: normal  BPM: 75                  Assessment and Plan:   Diagnoses and all orders for this visit:    Syncope and collapse   Lightheadedness  -Current symptoms do not clearly point to a clear cardiac deficiency/problem.  Possible vasovagal syndrome.  Possibly related to her seizures, although the patient states that the symptoms feel different.  -Heart monitor to assess for arrhythmia.  48-hour monitor due to insurance.  If negative will order 30-day heart monitor.  Even discussed the loop recorder implant with the patient today  -Echocardiogram to rule out structural heart abnormalities.  -Encouraged building cardiac fitness with regular forms of exercise    Seizure disorder  -Consider as potential etiology of syncopal episodes.  -Management per PCP and neurology.      - Return in about 6 months (around 6/3/2022) for Next scheduled follow up.    Scribed for Venkata Arcos MD by DAVID Angeles. 12/3/2021  13:14 EST    I, Venkata Arcos MD, personally performed the services as scribed by the above named individual. I have made any necessary edits and it is both accurate and complete.     Venkata Arcos MD, MSc, FACC, [facility]  Interventional Cardiology  [facility]

## 2021-12-03 ENCOUNTER — OFFICE VISIT (OUTPATIENT)
Dept: CARDIOLOGY | Facility: CLINIC | Age: 41
End: 2021-12-03

## 2021-12-03 ENCOUNTER — HOSPITAL ENCOUNTER (OUTPATIENT)
Facility: HOSPITAL | Age: 41
Discharge: HOME | End: 2021-12-03
Payer: MEDICAID

## 2021-12-03 VITALS
OXYGEN SATURATION: 96 % | WEIGHT: 185 LBS | BODY MASS INDEX: 34.93 KG/M2 | HEIGHT: 61 IN | HEART RATE: 81 BPM | SYSTOLIC BLOOD PRESSURE: 118 MMHG | DIASTOLIC BLOOD PRESSURE: 62 MMHG

## 2021-12-03 DIAGNOSIS — R06.09 DYSPNEA ON EXERTION: ICD-10-CM

## 2021-12-03 DIAGNOSIS — I10 ESSENTIAL HYPERTENSION: ICD-10-CM

## 2021-12-03 DIAGNOSIS — G40.909 SEIZURE DISORDER (HCC): ICD-10-CM

## 2021-12-03 DIAGNOSIS — R40.20 LOC (LOSS OF CONSCIOUSNESS) (HCC): ICD-10-CM

## 2021-12-03 DIAGNOSIS — R55 SYNCOPE AND COLLAPSE: Primary | ICD-10-CM

## 2021-12-03 PROBLEM — D50.9 IRON DEFICIENCY ANEMIA: Status: ACTIVE | Noted: 2021-02-05

## 2021-12-03 PROBLEM — G83.84 TODD'S PARALYSIS (HCC): Status: ACTIVE | Noted: 2021-07-06

## 2021-12-03 PROCEDURE — 99244 OFF/OP CNSLTJ NEW/EST MOD 40: CPT | Performed by: INTERNAL MEDICINE

## 2021-12-03 PROCEDURE — 93000 ELECTROCARDIOGRAM COMPLETE: CPT | Performed by: INTERNAL MEDICINE

## 2021-12-03 PROCEDURE — 93005 ELECTROCARDIOGRAM TRACING: CPT

## 2021-12-03 RX ORDER — PRAMIPEXOLE DIHYDROCHLORIDE 0.25 MG/1
0.25 TABLET ORAL 2 TIMES DAILY
COMMUNITY
End: 2022-05-06

## 2021-12-03 RX ORDER — PRAMIPEXOLE DIHYDROCHLORIDE 0.5 MG/1
0.5 TABLET ORAL DAILY
COMMUNITY

## 2021-12-03 RX ORDER — TOPIRAMATE 200 MG/1
200 CAPSULE, EXTENDED RELEASE ORAL DAILY
COMMUNITY
End: 2022-06-03

## 2021-12-03 RX ORDER — AMITRIPTYLINE HYDROCHLORIDE 10 MG/1
10 TABLET, FILM COATED ORAL 3 TIMES DAILY
COMMUNITY

## 2021-12-03 RX ORDER — LACOSAMIDE 100 MG/1
200 TABLET ORAL 2 TIMES DAILY
COMMUNITY
End: 2022-01-10 | Stop reason: DRUGHIGH

## 2021-12-10 ENCOUNTER — HOSPITAL ENCOUNTER (OUTPATIENT)
Dept: CARDIOLOGY | Facility: HOSPITAL | Age: 41
Discharge: HOME | End: 2021-12-10
Admitting: HOSPITALIST

## 2021-12-10 DIAGNOSIS — R55 SYNCOPE AND COLLAPSE: ICD-10-CM

## 2021-12-10 PROCEDURE — 93306 TTE W/DOPPLER COMPLETE: CPT | Performed by: INTERNAL MEDICINE

## 2021-12-10 PROCEDURE — 93306 TTE W/DOPPLER COMPLETE: CPT

## 2021-12-13 ENCOUNTER — TELEPHONE (OUTPATIENT)
Dept: CARDIOLOGY | Facility: CLINIC | Age: 41
End: 2021-12-13

## 2021-12-13 LAB
BH CV ECHO MEAS - AO MAX PG (FULL): 1.8 MMHG
BH CV ECHO MEAS - AO MAX PG: 6 MMHG
BH CV ECHO MEAS - AO MEAN PG (FULL): 1 MMHG
BH CV ECHO MEAS - AO MEAN PG: 3 MMHG
BH CV ECHO MEAS - AO ROOT AREA (BSA CORRECTED): 1.4
BH CV ECHO MEAS - AO ROOT AREA: 5.3 CM^2
BH CV ECHO MEAS - AO ROOT DIAM: 2.6 CM
BH CV ECHO MEAS - AO V2 MAX: 119 CM/SEC
BH CV ECHO MEAS - AO V2 MEAN: 72.2 CM/SEC
BH CV ECHO MEAS - AO V2 VTI: 24.4 CM
BH CV ECHO MEAS - ASC AORTA: 2.6 CM
BH CV ECHO MEAS - AVA(I,A): 2.4 CM^2
BH CV ECHO MEAS - AVA(I,D): 2.4 CM^2
BH CV ECHO MEAS - AVA(V,A): 2.5 CM^2
BH CV ECHO MEAS - AVA(V,D): 2.5 CM^2
BH CV ECHO MEAS - BSA(HAYCOCK): 1.9 M^2
BH CV ECHO MEAS - BSA: 1.8 M^2
BH CV ECHO MEAS - BZI_BMI: 35 KILOGRAMS/M^2
BH CV ECHO MEAS - BZI_METRIC_HEIGHT: 154.9 CM
BH CV ECHO MEAS - BZI_METRIC_WEIGHT: 83.9 KG
BH CV ECHO MEAS - EDV(CUBED): 46.3 ML
BH CV ECHO MEAS - EDV(MOD-SP2): 45 ML
BH CV ECHO MEAS - EDV(MOD-SP4): 52 ML
BH CV ECHO MEAS - EDV(TEICH): 54.1 ML
BH CV ECHO MEAS - EF(CUBED): 69 %
BH CV ECHO MEAS - EF(MOD-BP): 61 %
BH CV ECHO MEAS - EF(MOD-SP2): 57.8 %
BH CV ECHO MEAS - EF(MOD-SP4): 65.4 %
BH CV ECHO MEAS - EF(TEICH): 61.5 %
BH CV ECHO MEAS - ESV(CUBED): 14.3 ML
BH CV ECHO MEAS - ESV(MOD-SP2): 19 ML
BH CV ECHO MEAS - ESV(MOD-SP4): 18 ML
BH CV ECHO MEAS - ESV(TEICH): 20.8 ML
BH CV ECHO MEAS - FS: 32.3 %
BH CV ECHO MEAS - IVS/LVPW: 0.73
BH CV ECHO MEAS - IVSD: 0.73 CM
BH CV ECHO MEAS - LA DIMENSION: 3.3 CM
BH CV ECHO MEAS - LA/AO: 1.3
BH CV ECHO MEAS - LAD MAJOR: 5 CM
BH CV ECHO MEAS - LAT PEAK E' VEL: 15.7 CM/SEC
BH CV ECHO MEAS - LATERAL E/E' RATIO: 6.2
BH CV ECHO MEAS - LV DIASTOLIC VOL/BSA (35-75): 28.5 ML/M^2
BH CV ECHO MEAS - LV MASS(C)D: 87 GRAMS
BH CV ECHO MEAS - LV MASS(C)DI: 47.6 GRAMS/M^2
BH CV ECHO MEAS - LV MAX PG: 4.2 MMHG
BH CV ECHO MEAS - LV MEAN PG: 2 MMHG
BH CV ECHO MEAS - LV SYSTOLIC VOL/BSA (12-30): 9.8 ML/M^2
BH CV ECHO MEAS - LV V1 MAX: 103 CM/SEC
BH CV ECHO MEAS - LV V1 MEAN: 56.7 CM/SEC
BH CV ECHO MEAS - LV V1 VTI: 20.5 CM
BH CV ECHO MEAS - LVIDD: 3.6 CM
BH CV ECHO MEAS - LVIDS: 2.4 CM
BH CV ECHO MEAS - LVLD AP2: 6.5 CM
BH CV ECHO MEAS - LVLD AP4: 6.1 CM
BH CV ECHO MEAS - LVLS AP2: 5.5 CM
BH CV ECHO MEAS - LVLS AP4: 5 CM
BH CV ECHO MEAS - LVOT AREA (M): 2.8 CM^2
BH CV ECHO MEAS - LVOT AREA: 2.8 CM^2
BH CV ECHO MEAS - LVOT DIAM: 1.9 CM
BH CV ECHO MEAS - LVPWD: 1 CM
BH CV ECHO MEAS - MED PEAK E' VEL: 10.4 CM/SEC
BH CV ECHO MEAS - MEDIAL E/E' RATIO: 9.4
BH CV ECHO MEAS - MV A MAX VEL: 66.1 CM/SEC
BH CV ECHO MEAS - MV DEC SLOPE: 291 CM/SEC^2
BH CV ECHO MEAS - MV DEC TIME: 0.22 SEC
BH CV ECHO MEAS - MV E MAX VEL: 97.7 CM/SEC
BH CV ECHO MEAS - MV E/A: 1.5
BH CV ECHO MEAS - MV P1/2T MAX VEL: 85.8 CM/SEC
BH CV ECHO MEAS - MV P1/2T: 86.4 MSEC
BH CV ECHO MEAS - MVA P1/2T LCG: 2.6 CM^2
BH CV ECHO MEAS - MVA(P1/2T): 2.5 CM^2
BH CV ECHO MEAS - PA ACC SLOPE: 915 CM/SEC^2
BH CV ECHO MEAS - PA ACC TIME: 0.13 SEC
BH CV ECHO MEAS - PA PR(ACCEL): 21.9 MMHG
BH CV ECHO MEAS - RAP SYSTOLE: 3 MMHG
BH CV ECHO MEAS - RVSP: 20 MMHG
BH CV ECHO MEAS - SI(AO): 70.9 ML/M^2
BH CV ECHO MEAS - SI(CUBED): 17.5 ML/M^2
BH CV ECHO MEAS - SI(LVOT): 31.8 ML/M^2
BH CV ECHO MEAS - SI(MOD-SP2): 14.2 ML/M^2
BH CV ECHO MEAS - SI(MOD-SP4): 18.6 ML/M^2
BH CV ECHO MEAS - SI(TEICH): 18.2 ML/M^2
BH CV ECHO MEAS - SV(AO): 129.5 ML
BH CV ECHO MEAS - SV(CUBED): 31.9 ML
BH CV ECHO MEAS - SV(LVOT): 58.1 ML
BH CV ECHO MEAS - SV(MOD-SP2): 26 ML
BH CV ECHO MEAS - SV(MOD-SP4): 34 ML
BH CV ECHO MEAS - SV(TEICH): 33.3 ML
BH CV ECHO MEAS - TAPSE (>1.6): 2.5 CM
BH CV ECHO MEAS - TR MAX PG: 17 MMHG
BH CV ECHO MEAS - TR MAX VEL: 204 CM/SEC
BH CV ECHO MEASUREMENTS AVERAGE E/E' RATIO: 7.49
BH CV XLRA - RV BASE: 3.3 CM
BH CV XLRA - RV LENGTH: 5.4 CM
BH CV XLRA - RV MID: 2.9 CM
LEFT ATRIUM VOLUME INDEX: 19.7 ML/M^2
LEFT ATRIUM VOLUME: 36 ML

## 2022-01-10 ENCOUNTER — OFFICE VISIT (OUTPATIENT)
Dept: OBSTETRICS AND GYNECOLOGY | Facility: CLINIC | Age: 42
End: 2022-01-10

## 2022-01-10 VITALS
SYSTOLIC BLOOD PRESSURE: 158 MMHG | BODY MASS INDEX: 34.55 KG/M2 | WEIGHT: 183 LBS | DIASTOLIC BLOOD PRESSURE: 92 MMHG | HEIGHT: 61 IN

## 2022-01-10 DIAGNOSIS — R10.2 PELVIC PAIN: ICD-10-CM

## 2022-01-10 DIAGNOSIS — D39.11 NEOPLASM OF UNCERTAIN BEHAVIOR OF RIGHT OVARY: ICD-10-CM

## 2022-01-10 DIAGNOSIS — N92.1 MENORRHAGIA WITH IRREGULAR CYCLE: Primary | ICD-10-CM

## 2022-01-10 PROCEDURE — 99204 OFFICE O/P NEW MOD 45 MIN: CPT | Performed by: PHYSICIAN ASSISTANT

## 2022-01-10 RX ORDER — LACOSAMIDE 200 MG/1
200 TABLET, FILM COATED ORAL EVERY 12 HOURS SCHEDULED
COMMUNITY
Start: 2021-11-04

## 2022-01-10 RX ORDER — GALCANEZUMAB 120 MG/ML
120 INJECTION, SOLUTION SUBCUTANEOUS
COMMUNITY
Start: 2021-11-15

## 2022-01-10 RX ORDER — LEVETIRACETAM 500 MG/1
500 TABLET ORAL
COMMUNITY
End: 2022-05-06

## 2022-01-10 NOTE — PATIENT INSTRUCTIONS
Will check labs today  As bleeding has significantly lightened up at this time we will observe for further bleeding.  Discussed the possibility of starting Provera if bleeding does pick back up.  Will request op note from exploratory laparoscopy last week by general surgery  Pending results of  will discuss further management/evaluation.

## 2022-01-10 NOTE — PROGRESS NOTES
Subjective   Chief Complaint   Patient presents with   • Pelvic Pain     Pain on right side.  Explaratory surgery for appendicitis last week but it wasn't her appendix.  C/O nausea   • Menstrual Problem     C/O irregular periods, last pap over 3 years.  Would like to discuss Hysterectomy       Luz Maria Andrade is a 41 y.o. year old  presenting to be seen for complaint of pelvic pain and abnormal menses.  Patient reports that she had an exploratory laparoscopy with general surgeon Dr. Amos at [facility] last week.  Reports that they were checking for a hernia and possible appendicitis.  Patient states she was told that after laparoscopy they did not find any evidence of appendix abnormality and no intra-abdominal abnormalities.  She was told that there might be something going on with her right ovary and was advised to follow-up with gynecology.  Patient reports that she had negative CAT scan imaging prior to her exploratory laparoscopy.  Reports that she had been having a monthly regular cycle with a 3-day bleed.  However she has had some abnormal bleeding over the last month.  She had what she felt was a normal period in December that lasted 5 days.  The bleeding stopped for 5 days and then started back with heavier bleeding and changing protection hourly.  Bleeding has been going on for the past 2 weeks but today the bleeding has slowed down significantly.  She began having some acute pain in the right lower quadrant when the abnormal bleeding began.  Reports the pain is off and on but when  present feels like a stabbing pain in the right lower quadrant with radiation to the right low back.  She reports a history of polycystic ovary syndrome and has had episodes of amenorrhea in the past.  She is using condoms for birth control but was not able to conceive when younger.  Transvaginal pelvic ultrasound done in office today is reviewed with patient.  Uterus has a 3.2 cm intramural/subserosal left  fundal fibroid.  Endometrium is 8.3 mm.  Left ovary appears normal with small follicles.  Right ovary has small follicles and a 2 cm solid-appearing area within the ovary.  There is no free fluid.      Past Medical History:   Diagnosis Date   • Anemia    • Bleeding disorder (HCC)    • Clotting disorder (HCC)    • Epilepsy (HCC)    • Kidney stone    • Ovarian cyst    • Polycystic ovary syndrome         Current Outpatient Medications:   •  amitriptyline (ELAVIL) 10 MG tablet, Take 10 mg by mouth 3 (Three) Times a Day., Disp: , Rfl:   •  Cyanocobalamin (VITAMIN B 12) 100 MCG lozenge, Take  by mouth., Disp: , Rfl:   •  Emgality 120 MG/ML auto-injector pen, , Disp: , Rfl:   •  levETIRAcetam (KEPPRA) 500 MG tablet, Take 500 mg by mouth., Disp: , Rfl:   •  ondansetron ODT (ZOFRAN ODT) 8 MG disintegrating tablet, Take 1 tablet by mouth Every 8 (Eight) Hours As Needed for Nausea or Vomiting., Disp: 20 tablet, Rfl: 0  •  pramipexole (MIRAPEX) 0.25 MG tablet, Take 0.25 mg by mouth 2 (Two) Times a Day., Disp: , Rfl:   •  pramipexole (MIRAPEX) 0.5 MG tablet, Take 0.5 mg by mouth Daily., Disp: , Rfl:   •  Topiramate ER (Trokendi XR) 200 MG capsule sustained-release 24 hr, Take 200 mg by mouth Daily., Disp: , Rfl:   •  Vimpat 200 MG tablet, , Disp: , Rfl:    Allergies   Allergen Reactions   • Amoxicillin Rash     Patient reported no issue with amoxcillin-clavulanate 875 mg-125 mg twice daily for 10 days completed in 6/2021  Patient reported no issue with amoxcillin-clavulanate 875 mg-125 mg twice daily for 10 days completed in 6/2021        Past Surgical History:   Procedure Laterality Date   • CHOLECYSTECTOMY     • EXPLORATORY LAPAROTOMY     • GASTRIC BYPASS     • GASTRIC BYPASS        Social History     Socioeconomic History   • Marital status:    Tobacco Use   • Smoking status: Never Smoker   • Smokeless tobacco: Never Used   Vaping Use   • Vaping Use: Never used   Substance and Sexual Activity   • Alcohol use: No   •  "Drug use: No   • Sexual activity: Yes     Partners: Male     Birth control/protection: None      Family History   Problem Relation Age of Onset   • Heart failure Father    • Bleeding Disorder Father    • Heart disease Sister    • Clotting disorder Sister        Review of Systems   Constitutional: Negative for chills, diaphoresis and fever.   Gastrointestinal: Negative.    Genitourinary: Positive for menstrual problem and pelvic pain. Negative for difficulty urinating and dysuria.           Objective   /92   Ht 154.9 cm (61\")   Wt 83 kg (183 lb)   LMP 01/07/2022 (Exact Date)   Breastfeeding No   BMI 34.58 kg/m²     Physical Exam  Constitutional:       Appearance: Normal appearance. She is well-developed and well-groomed.   Eyes:      General: Lids are normal.      Extraocular Movements: Extraocular movements intact.      Conjunctiva/sclera: Conjunctivae normal.   Abdominal:      Palpations: Abdomen is soft.      Tenderness: There is no abdominal tenderness.   Genitourinary:     Labia:         Right: No rash, tenderness or lesion.         Left: No rash, tenderness or lesion.       Urethra: No prolapse, urethral pain, urethral swelling or urethral lesion.      Vagina: Bleeding present.      Cervix: No cervical motion tenderness or lesion.      Uterus: Not enlarged and not tender.       Adnexa:         Right: No mass or tenderness.          Left: No mass or tenderness.        Comments: Light bleeding observed   Skin:     General: Skin is warm and dry.      Findings: No bruising or lesion.   Neurological:      Mental Status: She is alert.   Psychiatric:         Attention and Perception: Attention normal.         Mood and Affect: Mood normal.         Speech: Speech normal.         Behavior: Behavior is cooperative.            Result Review :                   Assessment and Plan  Diagnoses and all orders for this visit:    1. Menorrhagia with irregular cycle (Primary)  -     HCG, B-subunit, Quantitative " ([facility] Only)  -     CBC (No Diff)  -     TSH  -     Cancel: US Non-ob Transvaginal    2. 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  Discussed the possibility of starting Provera if bleeding does pick back up.  Will request op note from exploratory laparoscopy last week by general surgery  Pending results of  will discuss further management/evaluation.             This note was electronically signed.    Meredith Malloy PA-C   January 10, 2022

## 2022-01-11 ENCOUNTER — TELEPHONE (OUTPATIENT)
Dept: OBSTETRICS AND GYNECOLOGY | Facility: CLINIC | Age: 42
End: 2022-01-11

## 2022-01-11 ENCOUNTER — PATIENT ROUNDING (BHMG ONLY) (OUTPATIENT)
Dept: OBSTETRICS AND GYNECOLOGY | Facility: CLINIC | Age: 42
End: 2022-01-11

## 2022-01-11 LAB
CANCER AG125 SERPL-ACNC: 22.2 U/ML (ref 0–38.1)
ERYTHROCYTE [DISTWIDTH] IN BLOOD BY AUTOMATED COUNT: 13.3 % (ref 12.3–15.4)
HCG INTACT+B SERPL-ACNC: <0.5 MIU/ML
HCT VFR BLD AUTO: 41.9 % (ref 34–46.6)
HGB BLD-MCNC: 13.8 G/DL (ref 12–15.9)
MCH RBC QN AUTO: 29.9 PG (ref 26.6–33)
MCHC RBC AUTO-ENTMCNC: 32.9 G/DL (ref 31.5–35.7)
MCV RBC AUTO: 90.7 FL (ref 79–97)
PLATELET # BLD AUTO: 326 10*3/MM3 (ref 140–450)
RBC # BLD AUTO: 4.62 10*6/MM3 (ref 3.77–5.28)
TSH SERPL DL<=0.005 MIU/L-ACNC: 4.5 UIU/ML (ref 0.27–4.2)
WBC # BLD AUTO: 4.34 10*3/MM3 (ref 3.4–10.8)

## 2022-01-11 NOTE — PROGRESS NOTES
January 11, 2022    Hello, may I speak with Luz Maria Andrade?    My name is Cinthia Corral    I am  with [facility]  [address], [address]  [place] [facility] [address] [phone].    Before we get started may I verify your date of birth? 1980    I am calling to officially welcome you to our practice and ask about your recent visit. Is this a good time to talk? Yes    Tell me about your visit with us. What things went well? Aurelia was great!  It all went very well.     We're always looking for ways to make our patients' experiences even better. Do you have recommendations on ways we may improve?  No    Overall were you satisfied with your first visit to our practice? Yes, I was.       I appreciate you taking the time to speak with me today. Is there anything else I can do for you? No    Thank you, and have a great day.

## 2022-01-11 NOTE — TELEPHONE ENCOUNTER
Patient called back and said that her bleeding and pain has increased this afternoon.  She said once she was up and moving around is when it increase.

## 2022-01-11 NOTE — PROGRESS NOTES
Patient is informed of lab results.  Advise  is in normal range.  TSH is very mildly elevated and we will plan on repeat TSH with reflex testing in 4 to 6 weeks.  I am waiting on op note from her surgery last week and patient will be contacted once that has been reviewed and we will discuss next steps.  She reports her bleeding continues to slow down and is lighter than it was yesterday at office visit.

## 2022-01-12 RX ORDER — MEDROXYPROGESTERONE ACETATE 10 MG/1
10 TABLET ORAL DAILY
Qty: 10 TABLET | Refills: 1 | Status: SHIPPED | OUTPATIENT
Start: 2022-01-12 | End: 2022-05-04 | Stop reason: HOSPADM

## 2022-01-12 NOTE — TELEPHONE ENCOUNTER
Please inform patient I have sent in a prescription for Provera to her pharmacy.  Take 1 tablet daily for 10 days.  Still waiting to receive her operative note from recent surgery.

## 2022-01-19 ENCOUNTER — TELEPHONE (OUTPATIENT)
Dept: CARDIOLOGY | Facility: CLINIC | Age: 42
End: 2022-01-19

## 2022-01-19 NOTE — TELEPHONE ENCOUNTER
Patient contacted to review monitor results. All questions answered, pt verbalizes understanding.

## 2022-01-19 NOTE — TELEPHONE ENCOUNTER
----- Message from DAVID Madison sent at 1/19/2022 12:51 PM EST -----  Can you let this patient know that her monitor did not show any significant arrhythmias? Thank you!

## 2022-02-02 ENCOUNTER — TELEPHONE (OUTPATIENT)
Dept: OBSTETRICS AND GYNECOLOGY | Facility: CLINIC | Age: 42
End: 2022-02-02

## 2022-02-02 NOTE — TELEPHONE ENCOUNTER
----- Message from Meredith Malloy PA-C sent at 2/2/2022 11:04 AM EST -----  Please inform patient that I had received a copy of her op note from her laparoscopy and January.  The note states that a left ovarian cyst was noted at the time of her surgery.  However on the day of the patient's visit pelvic ultrasound revealed the left ovary to be normal and the right ovary had a small solid-appearing area.  As her CA-125 was normal I would like her to follow-up in 6 weeks for a repeat ultrasound for surveillance of the right ovary.

## 2022-03-16 ENCOUNTER — OFFICE VISIT (OUTPATIENT)
Dept: OBSTETRICS AND GYNECOLOGY | Facility: CLINIC | Age: 42
End: 2022-03-16

## 2022-03-16 VITALS
DIASTOLIC BLOOD PRESSURE: 82 MMHG | HEIGHT: 61 IN | WEIGHT: 196.8 LBS | SYSTOLIC BLOOD PRESSURE: 140 MMHG | BODY MASS INDEX: 37.16 KG/M2

## 2022-03-16 DIAGNOSIS — D39.11 NEOPLASM OF UNCERTAIN BEHAVIOR OF RIGHT OVARY: Primary | ICD-10-CM

## 2022-03-16 DIAGNOSIS — D25.1 INTRAMURAL LEIOMYOMA OF UTERUS: ICD-10-CM

## 2022-03-16 PROCEDURE — 99214 OFFICE O/P EST MOD 30 MIN: CPT | Performed by: PHYSICIAN ASSISTANT

## 2022-03-16 NOTE — PROGRESS NOTES
Subjective   Chief Complaint   Patient presents with   • Follow-up     Patient is following-up for a TVS       Luz Maria Andrade is a 41 y.o. year old  presenting to be seen for follow-up.  Patient was seen in January and had had some abnormal bleeding and right lower quadrant pain.  Pelvic ultrasound obtained at the time revealed a 3 cm uterine fibroid, endometrium was 8 mm, and there was a 2 cm solid lesion on the right ovary.  Subsequent CA-125 returned to normal at 22.  Her prolonged bleeding episode did resolve on its own.  LMP has been 1 week ago  Transvaginal ultrasound done today is reviewed with patient.  Uterus is anteverted with a 2.7 intramural/subserosal fibroid in the left fundus that is stable from previous ultrasound.  Endometrium is 10 mm.  Left ovary is normal with small follicles.  The right ovary has a persistent 2 cm solid area that does appear stable from her prior scan.  There is no free fluid.    Past Medical History:   Diagnosis Date   • Anemia    • Bleeding disorder (HCC)    • Clotting disorder (HCC)    • Epilepsy (HCC)    • Kidney stone    • Ovarian cyst    • Polycystic ovary syndrome         Current Outpatient Medications:   •  amitriptyline (ELAVIL) 10 MG tablet, Take 10 mg by mouth 3 (Three) Times a Day., Disp: , Rfl:   •  Cyanocobalamin (VITAMIN B 12) 100 MCG lozenge, Take  by mouth., Disp: , Rfl:   •  Emgality 120 MG/ML auto-injector pen, , Disp: , Rfl:   •  levETIRAcetam (KEPPRA) 500 MG tablet, Take 500 mg by mouth., Disp: , Rfl:   •  ondansetron ODT (ZOFRAN ODT) 8 MG disintegrating tablet, Take 1 tablet by mouth Every 8 (Eight) Hours As Needed for Nausea or Vomiting., Disp: 20 tablet, Rfl: 0  •  pramipexole (MIRAPEX) 0.25 MG tablet, Take 0.25 mg by mouth 2 (Two) Times a Day., Disp: , Rfl:   •  pramipexole (MIRAPEX) 0.5 MG tablet, Take 0.5 mg by mouth Daily., Disp: , Rfl:   •  Topiramate ER (Trokendi XR) 200 MG capsule sustained-release 24 hr, Take 200 mg by mouth Daily., Disp:  ", Rfl:   •  Vimpat 200 MG tablet, , Disp: , Rfl:   •  medroxyPROGESTERone (Provera) 10 MG tablet, Take 1 tablet by mouth Daily., Disp: 10 tablet, Rfl: 1   Allergies   Allergen Reactions   • Amoxicillin Rash     Patient reported no issue with amoxcillin-clavulanate 875 mg-125 mg twice daily for 10 days completed in 6/2021  Patient reported no issue with amoxcillin-clavulanate 875 mg-125 mg twice daily for 10 days completed in 6/2021        Past Surgical History:   Procedure Laterality Date   • CHOLECYSTECTOMY     • EXPLORATORY LAPAROTOMY     • GASTRIC BYPASS     • GASTRIC BYPASS        Social History     Socioeconomic History   • Marital status:    Tobacco Use   • Smoking status: Never Smoker   • Smokeless tobacco: Never Used   Vaping Use   • Vaping Use: Never used   Substance and Sexual Activity   • Alcohol use: No   • Drug use: No   • Sexual activity: Yes     Partners: Male     Birth control/protection: None      Family History   Problem Relation Age of Onset   • Heart failure Father    • Bleeding Disorder Father    • Heart disease Sister    • Clotting disorder Sister        Review of Systems   Constitutional: Negative for chills, diaphoresis and fever.   Gastrointestinal: Negative.    Genitourinary: Positive for menstrual problem and pelvic pain. Negative for difficulty urinating and dysuria.           Objective   /82   Ht 154.9 cm (61\")   Wt 89.3 kg (196 lb 12.8 oz)   LMP 03/09/2022 (Exact Date)   Breastfeeding No   BMI 37.19 kg/m²     Physical Exam       Result Review :   The following data was reviewed by: Meredith Malloy PA-C on 03/16/2022:  AMSLABLIST:   Data reviewed: pelvic ultrasound images            Assessment and Plan  Diagnoses and all orders for this visit:    1. 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 Intramural leiomyoma of uterus      Patient Instructions   With persistent solid area in the right " ovary, will repeat a CA-125 today.  Recommend the right ovary be removed for definitive diagnosis.  The patient is requesting also to have a hysterectomy but to retain her left ovary.  Pending results of the CA-125 I will consult MD regarding proceeding with surgery.  Patient will need updated Pap smear prior to hysterectomy.             This note was electronically signed.    Meredith Malloy PA-C   March 18, 2022

## 2022-03-17 LAB — CANCER AG125 SERPL-ACNC: 13.5 U/ML (ref 0–38.1)

## 2022-03-17 NOTE — PATIENT INSTRUCTIONS
With persistent solid area in the right ovary, will repeat a CA-125 today.  Recommend the right ovary be removed for definitive diagnosis.  The patient is requesting also to have a hysterectomy but to retain her left ovary.  Pending results of the CA-125 I will consult MD regarding proceeding with surgery.  Patient will need updated Pap smear prior to hysterectomy.

## 2022-03-18 ENCOUNTER — PREP FOR SURGERY (OUTPATIENT)
Dept: OTHER | Facility: HOSPITAL | Age: 42
End: 2022-03-18

## 2022-03-18 DIAGNOSIS — D25.1 INTRAMURAL LEIOMYOMA OF UTERUS: ICD-10-CM

## 2022-03-18 DIAGNOSIS — D39.11 NEOPLASM OF UNCERTAIN BEHAVIOR OF RIGHT OVARY: Primary | ICD-10-CM

## 2022-03-18 RX ORDER — SODIUM CHLORIDE 0.9 % (FLUSH) 0.9 %
10 SYRINGE (ML) INJECTION AS NEEDED
Status: CANCELLED | OUTPATIENT
Start: 2022-03-18

## 2022-03-18 RX ORDER — CEFAZOLIN SODIUM 2 G/50ML
2 SOLUTION INTRAVENOUS ONCE
Status: CANCELLED | OUTPATIENT
Start: 2022-03-18 | End: 2022-03-18

## 2022-03-18 RX ORDER — PHENAZOPYRIDINE HYDROCHLORIDE 100 MG/1
200 TABLET, FILM COATED ORAL ONCE
Status: CANCELLED | OUTPATIENT
Start: 2022-03-18 | End: 2022-03-18

## 2022-03-18 RX ORDER — SODIUM CHLORIDE 0.9 % (FLUSH) 0.9 %
10 SYRINGE (ML) INJECTION EVERY 12 HOURS SCHEDULED
Status: CANCELLED | OUTPATIENT
Start: 2022-03-18

## 2022-03-21 ENCOUNTER — TELEPHONE (OUTPATIENT)
Dept: OBSTETRICS AND GYNECOLOGY | Facility: CLINIC | Age: 42
End: 2022-03-21

## 2022-03-21 NOTE — TELEPHONE ENCOUNTER
"Patient informed and case request placed. She states no history of DVT or PE. Has been told she is a \"free bleeder\" had normal PT and APTT  And anti Xa level July 2021.      ----- Message from Taniya Whiteside MD sent at 3/18/2022  9:11 AM EDT -----  Okay for patient to schedule.  Need to see what her bleeding and clotting disorders are that is listed in her history.  Thank you.  ----- Message -----  From: Meredith Malloy PA-C  Sent: 3/18/2022   8:51 AM EDT  To: MD Dr. Miesha Lima patient is requesting to have hysterectomy and RSO-retain left ovary. She has a persistent solid 2 cm area on right ovary and fibroid.  normal x 2. Is it OK to schedule? Thanks          "

## 2022-03-22 PROBLEM — D39.11 NEOPLASM OF UNCERTAIN BEHAVIOR OF RIGHT OVARY: Status: ACTIVE | Noted: 2022-03-22

## 2022-03-22 PROBLEM — D25.1 INTRAMURAL LEIOMYOMA OF UTERUS: Status: ACTIVE | Noted: 2022-03-22

## 2022-04-14 ENCOUNTER — OFFICE VISIT (OUTPATIENT)
Dept: OBSTETRICS AND GYNECOLOGY | Facility: CLINIC | Age: 42
End: 2022-04-14

## 2022-04-14 VITALS
BODY MASS INDEX: 37.95 KG/M2 | WEIGHT: 201 LBS | SYSTOLIC BLOOD PRESSURE: 140 MMHG | HEIGHT: 61 IN | DIASTOLIC BLOOD PRESSURE: 100 MMHG

## 2022-04-14 DIAGNOSIS — Z12.4 SCREENING FOR CERVICAL CANCER: Primary | ICD-10-CM

## 2022-04-14 DIAGNOSIS — Z12.31 ENCOUNTER FOR SCREENING MAMMOGRAM FOR MALIGNANT NEOPLASM OF BREAST: ICD-10-CM

## 2022-04-14 PROCEDURE — 99213 OFFICE O/P EST LOW 20 MIN: CPT | Performed by: PHYSICIAN ASSISTANT

## 2022-04-14 RX ORDER — FLUCONAZOLE 150 MG/1
TABLET ORAL
COMMUNITY
Start: 2022-04-12 | End: 2022-05-04 | Stop reason: HOSPADM

## 2022-04-14 RX ORDER — AZITHROMYCIN 250 MG/1
TABLET, FILM COATED ORAL
COMMUNITY
Start: 2022-04-12 | End: 2022-05-04 | Stop reason: HOSPADM

## 2022-04-14 NOTE — PATIENT INSTRUCTIONS
Patient is reassured there are no lumps or concerning findings on breast exam however recommend screening mammogram

## 2022-04-14 NOTE — PROGRESS NOTES
"Subjective   Chief Complaint   Patient presents with   • Follow-up     Patient is here today for a pap        Luz Maria Andrade is a 41 y.o. year old  presenting to be seen for pap smear  Last pap over 3 years ago  Scheduled for [facility] RSO in early May  Has never had screening mammogram.  Thinks she may have felt a lump in the left breast for a \"long time\".  She has never had breast exam by clinician or mammogram.  She is not having any breast pain or discomfort.      Past Medical History:   Diagnosis Date   • Anemia    • Bleeding disorder (HCC)    • Clotting disorder (HCC)    • Epilepsy (HCC)    • Kidney stone    • Ovarian cyst    • Polycystic ovary syndrome         Current Outpatient Medications:   •  amitriptyline (ELAVIL) 10 MG tablet, Take 10 mg by mouth 3 (Three) Times a Day., Disp: , Rfl:   •  Cyanocobalamin (VITAMIN B 12) 100 MCG lozenge, Take  by mouth., Disp: , Rfl:   •  Emgality 120 MG/ML auto-injector pen, , Disp: , Rfl:   •  levETIRAcetam (KEPPRA) 500 MG tablet, Take 500 mg by mouth., Disp: , Rfl:   •  medroxyPROGESTERone (Provera) 10 MG tablet, Take 1 tablet by mouth Daily., Disp: 10 tablet, Rfl: 1  •  ondansetron ODT (ZOFRAN ODT) 8 MG disintegrating tablet, Take 1 tablet by mouth Every 8 (Eight) Hours As Needed for Nausea or Vomiting., Disp: 20 tablet, Rfl: 0  •  pramipexole (MIRAPEX) 0.25 MG tablet, Take 0.25 mg by mouth 2 (Two) Times a Day., Disp: , Rfl:   •  pramipexole (MIRAPEX) 0.5 MG tablet, Take 0.5 mg by mouth Daily., Disp: , Rfl:   •  Topiramate ER (Trokendi XR) 200 MG capsule sustained-release 24 hr, Take 200 mg by mouth Daily., Disp: , Rfl:   •  Vimpat 200 MG tablet, , Disp: , Rfl:   •  azithromycin (ZITHROMAX) 250 MG tablet, , Disp: , Rfl:   •  fluconazole (DIFLUCAN) 150 MG tablet, , Disp: , Rfl:    Allergies   Allergen Reactions   • Amoxicillin Rash     Patient reported no issue with amoxcillin-clavulanate 875 mg-125 mg twice daily for 10 days completed in 2021  Patient reported no " "issue with amoxcillin-clavulanate 875 mg-125 mg twice daily for 10 days completed in 6/2021        Past Surgical History:   Procedure Laterality Date   • CHOLECYSTECTOMY     • EXPLORATORY LAPAROTOMY     • GASTRIC BYPASS     • GASTRIC BYPASS        Social History     Socioeconomic History   • Marital status:    Tobacco Use   • Smoking status: Never Smoker   • Smokeless tobacco: Never Used   Vaping Use   • Vaping Use: Never used   Substance and Sexual Activity   • Alcohol use: No   • Drug use: No   • Sexual activity: Yes     Partners: Male     Birth control/protection: None      Family History   Problem Relation Age of Onset   • Heart failure Father    • Bleeding Disorder Father    • Heart disease Sister    • Clotting disorder Sister        Review of Systems   Constitutional: Negative for chills, diaphoresis and fever.   Gastrointestinal: Negative.    Genitourinary: Negative for difficulty urinating and dysuria.           Objective   /100   Ht 154.9 cm (61\")   Wt 91.2 kg (201 lb)   LMP 04/07/2022 (Exact Date)   Breastfeeding No   BMI 37.98 kg/m²     Physical Exam  Constitutional:       Appearance: Normal appearance. She is well-developed and well-groomed.   Eyes:      General: Lids are normal.      Extraocular Movements: Extraocular movements intact.      Conjunctiva/sclera: Conjunctivae normal.   Chest:   Breasts: Breasts are symmetrical.      Right: No inverted nipple, mass, nipple discharge, skin change, tenderness or axillary adenopathy.      Left: No inverted nipple, mass, nipple discharge, skin change, tenderness or axillary adenopathy.       Genitourinary:     Labia:         Right: No rash, tenderness or lesion.         Left: No rash, tenderness or lesion.       Urethra: No prolapse, urethral pain, urethral swelling or urethral lesion.      Vagina: No vaginal discharge, tenderness or lesions.      Cervix: No cervical motion tenderness, discharge, friability or lesion.      Uterus: Not enlarged " and not tender.       Adnexa:         Right: No mass or tenderness.          Left: No mass or tenderness.     Lymphadenopathy:      Upper Body:      Right upper body: No axillary adenopathy.      Left upper body: No axillary adenopathy.   Skin:     General: Skin is warm and dry.      Findings: No bruising or lesion.   Neurological:      Mental Status: She is alert.   Psychiatric:         Attention and Perception: Attention normal.         Mood and Affect: Mood normal.         Speech: Speech normal.         Behavior: Behavior is cooperative.            Result Review :                   Assessment and Plan  Diagnoses and all orders for this visit:    1. Screening for cervical cancer (Primary)  -     Pap IG, HPV-hr; Future    2. Encounter for screening mammogram for malignant neoplasm of breast  -     Mammo Screening Digital Tomosynthesis Bilateral With CAD; Future      Patient Instructions   Patient is reassured there are no lumps or concerning findings on breast exam however recommend screening mammogram             This note was electronically signed.    Meredith Malloy PA-C   April 14, 2022

## 2022-04-28 ENCOUNTER — TELEPHONE (OUTPATIENT)
Dept: PREADMISSION TESTING | Facility: HOSPITAL | Age: 42
End: 2022-04-28

## 2022-04-28 ENCOUNTER — TELEPHONE (OUTPATIENT)
Dept: OBSTETRICS AND GYNECOLOGY | Facility: CLINIC | Age: 42
End: 2022-04-28

## 2022-04-29 ENCOUNTER — PRE-ADMISSION TESTING (OUTPATIENT)
Dept: PREADMISSION TESTING | Facility: HOSPITAL | Age: 42
End: 2022-04-29

## 2022-04-29 VITALS — BODY MASS INDEX: 38.4 KG/M2 | WEIGHT: 203.4 LBS | HEIGHT: 61 IN

## 2022-04-29 DIAGNOSIS — D39.11 NEOPLASM OF UNCERTAIN BEHAVIOR OF RIGHT OVARY: ICD-10-CM

## 2022-04-29 DIAGNOSIS — Z12.4 SCREENING FOR CERVICAL CANCER: ICD-10-CM

## 2022-04-29 DIAGNOSIS — D25.1 INTRAMURAL LEIOMYOMA OF UTERUS: ICD-10-CM

## 2022-04-29 LAB
ABO GROUP BLD: NORMAL
B-HCG UR QL: NEGATIVE
BASOPHILS # BLD AUTO: 0.02 10*3/MM3 (ref 0–0.2)
BASOPHILS NFR BLD AUTO: 0.4 % (ref 0–1.5)
BILIRUB UR QL STRIP: NEGATIVE
CLARITY UR: CLEAR
COLOR UR: YELLOW
DEPRECATED RDW RBC AUTO: 44.5 FL (ref 37–54)
EOSINOPHIL # BLD AUTO: 0.14 10*3/MM3 (ref 0–0.4)
EOSINOPHIL NFR BLD AUTO: 2.6 % (ref 0.3–6.2)
ERYTHROCYTE [DISTWIDTH] IN BLOOD BY AUTOMATED COUNT: 13.9 % (ref 12.3–15.4)
GLUCOSE UR STRIP-MCNC: NEGATIVE MG/DL
HCT VFR BLD AUTO: 40.5 % (ref 34–46.6)
HGB BLD-MCNC: 13 G/DL (ref 12–15.9)
HGB UR QL STRIP.AUTO: NEGATIVE
IMM GRANULOCYTES # BLD AUTO: 0.02 10*3/MM3 (ref 0–0.05)
IMM GRANULOCYTES NFR BLD AUTO: 0.4 % (ref 0–0.5)
KETONES UR QL STRIP: NEGATIVE
LEUKOCYTE ESTERASE UR QL STRIP.AUTO: NEGATIVE
LYMPHOCYTES # BLD AUTO: 1.47 10*3/MM3 (ref 0.7–3.1)
LYMPHOCYTES NFR BLD AUTO: 27.1 % (ref 19.6–45.3)
MCH RBC QN AUTO: 28.4 PG (ref 26.6–33)
MCHC RBC AUTO-ENTMCNC: 32.1 G/DL (ref 31.5–35.7)
MCV RBC AUTO: 88.4 FL (ref 79–97)
MONOCYTES # BLD AUTO: 0.23 10*3/MM3 (ref 0.1–0.9)
MONOCYTES NFR BLD AUTO: 4.2 % (ref 5–12)
NEUTROPHILS NFR BLD AUTO: 3.55 10*3/MM3 (ref 1.7–7)
NEUTROPHILS NFR BLD AUTO: 65.3 % (ref 42.7–76)
NITRITE UR QL STRIP: NEGATIVE
NRBC BLD AUTO-RTO: 0 /100 WBC (ref 0–0.2)
PH UR STRIP.AUTO: 6.5 [PH] (ref 5–8)
PLATELET # BLD AUTO: 302 10*3/MM3 (ref 140–450)
PMV BLD AUTO: 9.4 FL (ref 6–12)
PROT UR QL STRIP: NEGATIVE
RBC # BLD AUTO: 4.58 10*6/MM3 (ref 3.77–5.28)
RH BLD: NEGATIVE
SP GR UR STRIP: 1.02 (ref 1–1.03)
UROBILINOGEN UR QL STRIP: NORMAL
WBC NRBC COR # BLD: 5.43 10*3/MM3 (ref 3.4–10.8)

## 2022-04-29 PROCEDURE — 86901 BLOOD TYPING SEROLOGIC RH(D): CPT

## 2022-04-29 PROCEDURE — 81003 URINALYSIS AUTO W/O SCOPE: CPT

## 2022-04-29 PROCEDURE — 81025 URINE PREGNANCY TEST: CPT

## 2022-04-29 PROCEDURE — 85025 COMPLETE CBC W/AUTO DIFF WBC: CPT

## 2022-04-29 PROCEDURE — C9803 HOPD COVID-19 SPEC COLLECT: HCPCS

## 2022-04-29 PROCEDURE — 36415 COLL VENOUS BLD VENIPUNCTURE: CPT

## 2022-04-29 PROCEDURE — U0004 COV-19 TEST NON-CDC HGH THRU: HCPCS

## 2022-04-29 PROCEDURE — 86900 BLOOD TYPING SEROLOGIC ABO: CPT

## 2022-04-30 LAB — SARS-COV-2 RNA NOSE QL NAA+PROBE: NOT DETECTED

## 2022-05-01 PROCEDURE — S0260 H&P FOR SURGERY: HCPCS | Performed by: OBSTETRICS & GYNECOLOGY

## 2022-05-01 NOTE — H&P
Rui  Luz Maria Andrade  : 1980  MRN: [id number]  CSN: [id number]    History and Physical  Subjective   Luz Maria Andrade is a 41 y.o. year old  who presents for surgery due to persistent solid appearing mass of the right ovary.  Patient also with abnormal uterine bleeding.  Patient was seen by physicians assistant as noted.  Patient had a normal .  She was noted on ultrasound to have a 3 cm uterine fibroid as well as a 2 cm solid lesion of the right ovary.  The patient also had a diagnostic laparoscopy earlier this year for abdominal pain.  Per the operative report she was noted to have an enlarged left ovary with an ovarian cyst.  On recent imaging she is noted to have a solid lesion of the right ovary as noted.  Repeat imaging confirmed persistence of the lesion.  Her left ovary was normal.  Patient is here for definitive treatment with hysterectomy and RSO.  Patient desires to retain her left ovary if normal in appearance.    History  Past Medical History:   Diagnosis Date   • Anemia    • Anxiety    • Bleeding disorder (HCC)     Free bleeding but no hemophelia   • Clotting disorder (HCC)    • Ear piercing    • Epilepsy (HCC)     last seizure 3/2022   • GERD (gastroesophageal reflux disease)    • History of Holter monitoring 2022   • Hx of echocardiogram 12/10/2021   • Irregular heart beat    • Kidney stone    • Murmur    • Ovarian cyst    • Polycystic ovary syndrome    • PONV (postoperative nausea and vomiting)    • Seasonal allergies    • Spinal headache    • Syncope      No current facility-administered medications on file prior to encounter.     Current Outpatient Medications on File Prior to Encounter   Medication Sig Dispense Refill   • amitriptyline (ELAVIL) 10 MG tablet Take 10 mg by mouth 3 (Three) Times a Day.     • Cyanocobalamin (VITAMIN B 12) 100 MCG lozenge Take  by mouth.     • Emgality 120 MG/ML auto-injector pen      • levETIRAcetam (KEPPRA) 500 MG tablet Take  500 mg by mouth. (Patient not taking: Reported on 4/29/2022)     • medroxyPROGESTERone (Provera) 10 MG tablet Take 1 tablet by mouth Daily. (Patient not taking: Reported on 4/29/2022) 10 tablet 1   • ondansetron ODT (ZOFRAN ODT) 8 MG disintegrating tablet Take 1 tablet by mouth Every 8 (Eight) Hours As Needed for Nausea or Vomiting. 20 tablet 0   • pramipexole (MIRAPEX) 0.25 MG tablet Take 0.25 mg by mouth 2 (Two) Times a Day.     • pramipexole (MIRAPEX) 0.5 MG tablet Take 0.5 mg by mouth Daily.     • Topiramate ER (Trokendi XR) 200 MG capsule sustained-release 24 hr Take 200 mg by mouth Daily. (Patient not taking: Reported on 4/29/2022)     • Vimpat 200 MG tablet        Allergies   Allergen Reactions   • Amoxicillin Rash     Patient reported no issue with amoxcillin-clavulanate 875 mg-125 mg twice daily for 10 days completed in 6/2021  Patient reported no issue with amoxcillin-clavulanate 875 mg-125 mg twice daily for 10 days completed in 6/2021       Past Surgical History:   Procedure Laterality Date   • CHOLECYSTECTOMY     • DILATATION AND CURETTAGE     • EAR TUBES     • ENDOSCOPY     • EXPLORATORY LAPAROTOMY     • GASTRIC BYPASS  2017   • GASTRIC BYPASS     • TONSILLECTOMY       Family History   Problem Relation Age of Onset   • Heart failure Father    • Bleeding Disorder Father    • Heart disease Sister    • Clotting disorder Sister      Social History     Socioeconomic History   • Marital status:    Tobacco Use   • Smoking status: Never Smoker   • Smokeless tobacco: Never Used   Vaping Use   • Vaping Use: Never used   Substance and Sexual Activity   • Alcohol use: No   • Drug use: No   • Sexual activity: Defer     Partners: Male     Birth control/protection: None     Review of Systems  The following systems were reviewed and negative:  constitution, eyes, ENT, respiratory, cardiovascular, gastrointestinal, genitourinary, integument, breast, hematologic / lymphatic, musculoskeletal, neurological,  behavioral/psych, endocrine and allergies / immunologic     Objective  Recent Vitals       12/3/2021 1/10/2022 3/16/2022       BP: 118/62 158/92 140/82     Pulse: 81 -- --     Weight: 83.9 kg (185 lb) 83 kg (183 lb) 89.3 kg (196 lb 12.8 oz)     BMI (Calculated): 35 34.6 37.2         Physical Exam:  General Appearance: alert, appears stated age and cooperative  Head: normocephalic, without obvious abnormality and atraumatic  Eyes: lids and lashes normal, conjunctivae and sclerae normal, no icterus, no pallor and corneas clear  Lungs: clear to auscultation, respirations regular, respirations even and respirations unlabored  Heart: regular rhythm and normal rate, normal S1, S2, no murmur, gallop, or rubs and no click  Abdomen: normal bowel sounds, no masses, no hepatomegaly, no splenomegaly, soft non-tender, no guarding and no rebound tenderness  Extremities: moves extremities well, no edema, no cyanosis and no redness  Skin: no bleeding, bruising or rash and no lesions noted  Psych: normal mood and affect, oriented to person, time and place, thought content organized and appropriate judgment    Recent Labs  Lab Results (last 7 days)     ** No results found for the last 168 hours. **           Recent Imaging  No radiology results for the last 10 days        Assessment   1. Abnormal uterine bleeding  2. Right solid ovarian mass  3. Leiomyoma     Plan   1. TLH, RSO, left salpingectomy, cystoscopy, and any other procedures deemed necessary.  2. ABx and DVT prophylaxis as indicated.  3. Risks, complications, benefits, and other alternatives discussed.  4. All questions answered and pt in agreement with plan.    Taniya Whiteside M.D.  5/1/2022  15:56 EDT

## 2022-05-02 ENCOUNTER — ANESTHESIA EVENT (OUTPATIENT)
Dept: PERIOP | Facility: HOSPITAL | Age: 42
End: 2022-05-02

## 2022-05-02 NOTE — ANESTHESIA PREPROCEDURE EVALUATION
Anesthesia Evaluation     Patient summary reviewed and Nursing notes reviewed   history of anesthetic complications: PONV  NPO Solid Status: > 8 hours  NPO Liquid Status: > 8 hours           Airway   Mallampati: II  TM distance: <3 FB  Neck ROM: full  No difficulty expected  Dental    (+) poor dentition        Pulmonary     breath sounds clear to auscultation  (+) shortness of breath,   Cardiovascular     ECG reviewed  Rhythm: regular  Rate: normal    (+) hypertension well controlled less than 2 medications, valvular problems/murmurs murmur,     ROS comment: EKG: SR  Echo: EF 65%    Neuro/Psych  (+) seizures well controlled, headaches, syncope, psychiatric history,    GI/Hepatic/Renal/Endo    (+) obesity, morbid obesity, GERD well controlled,  renal disease stones,     Musculoskeletal     Abdominal     Abdomen: soft.  Bowel sounds: normal.   Substance History      OB/GYN          Other                      Anesthesia Plan    ASA 3     general   (Risks and benefits discussed including risk of aspiration, recall and dental damage. All patient questions answered.    Patient told that either a breathing mask or a breathing tube will be used to manage the airway.    Will continue with plan of care.)  intravenous induction     Anesthetic plan, all risks, benefits, and alternatives have been provided, discussed and informed consent has been obtained with: patient.        CODE STATUS:

## 2022-05-03 ENCOUNTER — ANESTHESIA (OUTPATIENT)
Dept: PERIOP | Facility: HOSPITAL | Age: 42
End: 2022-05-03

## 2022-05-03 ENCOUNTER — HOSPITAL ENCOUNTER (OUTPATIENT)
Facility: HOSPITAL | Age: 42
Discharge: HOME | End: 2022-05-04
Attending: OBSTETRICS & GYNECOLOGY | Admitting: OBSTETRICS & GYNECOLOGY

## 2022-05-03 DIAGNOSIS — D39.11 NEOPLASM OF UNCERTAIN BEHAVIOR OF RIGHT OVARY: ICD-10-CM

## 2022-05-03 DIAGNOSIS — G47.00 INSOMNIA, UNSPECIFIED TYPE: Primary | ICD-10-CM

## 2022-05-03 DIAGNOSIS — D25.1 INTRAMURAL LEIOMYOMA OF UTERUS: ICD-10-CM

## 2022-05-03 LAB
ABO GROUP BLD: NORMAL
BLD GP AB SCN SERPL QL: NEGATIVE
RH BLD: NEGATIVE
T&S EXPIRATION DATE: NORMAL

## 2022-05-03 PROCEDURE — G0378 HOSPITAL OBSERVATION PER HR: HCPCS

## 2022-05-03 PROCEDURE — 25010000002 ONDANSETRON PER 1 MG: Performed by: NURSE ANESTHETIST, CERTIFIED REGISTERED

## 2022-05-03 PROCEDURE — 25010000002 LORAZEPAM PER 2 MG: Performed by: OBSTETRICS & GYNECOLOGY

## 2022-05-03 PROCEDURE — 25010000002 DEXAMETHASONE PER 1 MG: Performed by: NURSE ANESTHETIST, CERTIFIED REGISTERED

## 2022-05-03 PROCEDURE — 86900 BLOOD TYPING SEROLOGIC ABO: CPT | Performed by: PHYSICIAN ASSISTANT

## 2022-05-03 PROCEDURE — 25010000002 FENTANYL CITRATE (PF) 100 MCG/2ML SOLUTION: Performed by: NURSE ANESTHETIST, CERTIFIED REGISTERED

## 2022-05-03 PROCEDURE — 25010000002 HALOPERIDOL LACTATE PER 5 MG: Performed by: NURSE ANESTHETIST, CERTIFIED REGISTERED

## 2022-05-03 PROCEDURE — 58571 TLH W/T/O 250 G OR LESS: CPT | Performed by: OBSTETRICS & GYNECOLOGY

## 2022-05-03 PROCEDURE — 86901 BLOOD TYPING SEROLOGIC RH(D): CPT | Performed by: PHYSICIAN ASSISTANT

## 2022-05-03 PROCEDURE — 0 CEFAZOLIN SODIUM-DEXTROSE 2-3 GM-%(50ML) RECONSTITUTED SOLUTION: Performed by: PHYSICIAN ASSISTANT

## 2022-05-03 PROCEDURE — 63710000001 ONDANSETRON PER 8 MG: Performed by: OBSTETRICS & GYNECOLOGY

## 2022-05-03 PROCEDURE — 25010000002 MIDAZOLAM PER 1MG: Performed by: NURSE ANESTHETIST, CERTIFIED REGISTERED

## 2022-05-03 PROCEDURE — 25010000002 PROPOFOL 200 MG/20ML EMULSION: Performed by: NURSE ANESTHETIST, CERTIFIED REGISTERED

## 2022-05-03 PROCEDURE — 25010000002 HYDROMORPHONE 1 MG/ML SOLUTION: Performed by: NURSE ANESTHETIST, CERTIFIED REGISTERED

## 2022-05-03 PROCEDURE — 86850 RBC ANTIBODY SCREEN: CPT | Performed by: PHYSICIAN ASSISTANT

## 2022-05-03 DEVICE — ABSORBABLE RELOAD
Type: IMPLANTABLE DEVICE | Site: ABDOMEN | Status: FUNCTIONAL
Brand: V-LOC 180

## 2022-05-03 RX ORDER — ONDANSETRON 4 MG/1
4 TABLET, FILM COATED ORAL EVERY 6 HOURS PRN
Status: DISCONTINUED | OUTPATIENT
Start: 2022-05-03 | End: 2022-05-04 | Stop reason: HOSPADM

## 2022-05-03 RX ORDER — SODIUM CHLORIDE 0.9 % (FLUSH) 0.9 %
10 SYRINGE (ML) INJECTION AS NEEDED
Status: DISCONTINUED | OUTPATIENT
Start: 2022-05-03 | End: 2022-05-03 | Stop reason: HOSPADM

## 2022-05-03 RX ORDER — OXYCODONE HYDROCHLORIDE 5 MG/1
10 TABLET ORAL EVERY 4 HOURS PRN
Status: DISCONTINUED | OUTPATIENT
Start: 2022-05-03 | End: 2022-05-04 | Stop reason: HOSPADM

## 2022-05-03 RX ORDER — SODIUM CHLORIDE, SODIUM LACTATE, POTASSIUM CHLORIDE, CALCIUM CHLORIDE 600; 310; 30; 20 MG/100ML; MG/100ML; MG/100ML; MG/100ML
1000 INJECTION, SOLUTION INTRAVENOUS CONTINUOUS
Status: DISCONTINUED | OUTPATIENT
Start: 2022-05-03 | End: 2022-05-03

## 2022-05-03 RX ORDER — DEXAMETHASONE SODIUM PHOSPHATE 4 MG/ML
INJECTION, SOLUTION INTRA-ARTICULAR; INTRALESIONAL; INTRAMUSCULAR; INTRAVENOUS; SOFT TISSUE AS NEEDED
Status: DISCONTINUED | OUTPATIENT
Start: 2022-05-03 | End: 2022-05-03 | Stop reason: SURG

## 2022-05-03 RX ORDER — NALOXONE HCL 0.4 MG/ML
0.1 VIAL (ML) INJECTION
Status: DISCONTINUED | OUTPATIENT
Start: 2022-05-03 | End: 2022-05-04 | Stop reason: HOSPADM

## 2022-05-03 RX ORDER — ROCURONIUM BROMIDE 10 MG/ML
INJECTION, SOLUTION INTRAVENOUS AS NEEDED
Status: DISCONTINUED | OUTPATIENT
Start: 2022-05-03 | End: 2022-05-03 | Stop reason: SURG

## 2022-05-03 RX ORDER — DEXTROSE AND SODIUM CHLORIDE 5; .45 G/100ML; G/100ML
125 INJECTION, SOLUTION INTRAVENOUS CONTINUOUS
Status: DISCONTINUED | OUTPATIENT
Start: 2022-05-03 | End: 2022-05-04 | Stop reason: HOSPADM

## 2022-05-03 RX ORDER — ONDANSETRON 2 MG/ML
INJECTION INTRAMUSCULAR; INTRAVENOUS AS NEEDED
Status: DISCONTINUED | OUTPATIENT
Start: 2022-05-03 | End: 2022-05-03 | Stop reason: SURG

## 2022-05-03 RX ORDER — HALOPERIDOL 5 MG/ML
INJECTION INTRAMUSCULAR AS NEEDED
Status: DISCONTINUED | OUTPATIENT
Start: 2022-05-03 | End: 2022-05-03 | Stop reason: SURG

## 2022-05-03 RX ORDER — MAGNESIUM HYDROXIDE 1200 MG/15ML
LIQUID ORAL AS NEEDED
Status: DISCONTINUED | OUTPATIENT
Start: 2022-05-03 | End: 2022-05-03 | Stop reason: HOSPADM

## 2022-05-03 RX ORDER — FENTANYL CITRATE 50 UG/ML
INJECTION, SOLUTION INTRAMUSCULAR; INTRAVENOUS AS NEEDED
Status: DISCONTINUED | OUTPATIENT
Start: 2022-05-03 | End: 2022-05-03 | Stop reason: SURG

## 2022-05-03 RX ORDER — OXYCODONE HYDROCHLORIDE 5 MG/1
5 TABLET ORAL EVERY 4 HOURS PRN
Status: DISCONTINUED | OUTPATIENT
Start: 2022-05-03 | End: 2022-05-04 | Stop reason: HOSPADM

## 2022-05-03 RX ORDER — BISACODYL 10 MG
10 SUPPOSITORY, RECTAL RECTAL DAILY PRN
Status: DISCONTINUED | OUTPATIENT
Start: 2022-05-03 | End: 2022-05-04 | Stop reason: HOSPADM

## 2022-05-03 RX ORDER — PROMETHAZINE HYDROCHLORIDE 25 MG/1
25 TABLET ORAL ONCE AS NEEDED
Status: DISCONTINUED | OUTPATIENT
Start: 2022-05-03 | End: 2022-05-03 | Stop reason: HOSPADM

## 2022-05-03 RX ORDER — SIMETHICONE 80 MG
80 TABLET,CHEWABLE ORAL 4 TIMES DAILY PRN
Status: DISCONTINUED | OUTPATIENT
Start: 2022-05-03 | End: 2022-05-04 | Stop reason: HOSPADM

## 2022-05-03 RX ORDER — LORAZEPAM 2 MG/ML
0.5 INJECTION INTRAMUSCULAR EVERY 6 HOURS PRN
Status: DISCONTINUED | OUTPATIENT
Start: 2022-05-03 | End: 2022-05-04 | Stop reason: HOSPADM

## 2022-05-03 RX ORDER — OXYCODONE HYDROCHLORIDE 5 MG/1
5 TABLET ORAL EVERY 4 HOURS PRN
Status: DISCONTINUED | OUTPATIENT
Start: 2022-05-03 | End: 2022-05-03

## 2022-05-03 RX ORDER — LORAZEPAM 2 MG/ML
0.5 CONCENTRATE ORAL EVERY 6 HOURS PRN
Status: DISCONTINUED | OUTPATIENT
Start: 2022-05-03 | End: 2022-05-03

## 2022-05-03 RX ORDER — PHENAZOPYRIDINE HYDROCHLORIDE 100 MG/1
200 TABLET, FILM COATED ORAL ONCE
Status: COMPLETED | OUTPATIENT
Start: 2022-05-03 | End: 2022-05-03

## 2022-05-03 RX ORDER — DOCUSATE SODIUM 100 MG/1
100 CAPSULE, LIQUID FILLED ORAL 2 TIMES DAILY PRN
Status: DISCONTINUED | OUTPATIENT
Start: 2022-05-03 | End: 2022-05-04 | Stop reason: HOSPADM

## 2022-05-03 RX ORDER — ACETAMINOPHEN 500 MG
1000 TABLET ORAL EVERY 8 HOURS
Status: DISCONTINUED | OUTPATIENT
Start: 2022-05-03 | End: 2022-05-04 | Stop reason: HOSPADM

## 2022-05-03 RX ORDER — SODIUM CHLORIDE 0.9 % (FLUSH) 0.9 %
10 SYRINGE (ML) INJECTION EVERY 12 HOURS SCHEDULED
Status: DISCONTINUED | OUTPATIENT
Start: 2022-05-03 | End: 2022-05-03 | Stop reason: HOSPADM

## 2022-05-03 RX ORDER — SCOLOPAMINE TRANSDERMAL SYSTEM 1 MG/1
1 PATCH, EXTENDED RELEASE TRANSDERMAL CONTINUOUS
Status: DISPENSED | OUTPATIENT
Start: 2022-05-03 | End: 2022-05-04

## 2022-05-03 RX ORDER — MEPERIDINE HYDROCHLORIDE 25 MG/ML
12.5 INJECTION INTRAMUSCULAR; INTRAVENOUS; SUBCUTANEOUS
Status: DISCONTINUED | OUTPATIENT
Start: 2022-05-03 | End: 2022-05-03 | Stop reason: HOSPADM

## 2022-05-03 RX ORDER — NALOXONE HCL 0.4 MG/ML
0.4 VIAL (ML) INJECTION
Status: DISCONTINUED | OUTPATIENT
Start: 2022-05-03 | End: 2022-05-04 | Stop reason: HOSPADM

## 2022-05-03 RX ORDER — PROMETHAZINE HYDROCHLORIDE 25 MG/1
25 SUPPOSITORY RECTAL ONCE AS NEEDED
Status: DISCONTINUED | OUTPATIENT
Start: 2022-05-03 | End: 2022-05-03 | Stop reason: HOSPADM

## 2022-05-03 RX ORDER — CEFAZOLIN SODIUM 2 G/50ML
2 SOLUTION INTRAVENOUS ONCE
Status: COMPLETED | OUTPATIENT
Start: 2022-05-03 | End: 2022-05-03

## 2022-05-03 RX ORDER — ONDANSETRON 2 MG/ML
4 INJECTION INTRAMUSCULAR; INTRAVENOUS EVERY 6 HOURS PRN
Status: DISCONTINUED | OUTPATIENT
Start: 2022-05-03 | End: 2022-05-04 | Stop reason: HOSPADM

## 2022-05-03 RX ORDER — IBUPROFEN 800 MG/1
800 TABLET ORAL EVERY 8 HOURS
Status: DISCONTINUED | OUTPATIENT
Start: 2022-05-04 | End: 2022-05-04 | Stop reason: HOSPADM

## 2022-05-03 RX ORDER — ONDANSETRON 2 MG/ML
4 INJECTION INTRAMUSCULAR; INTRAVENOUS ONCE AS NEEDED
Status: COMPLETED | OUTPATIENT
Start: 2022-05-03 | End: 2022-05-03

## 2022-05-03 RX ORDER — MIDAZOLAM HYDROCHLORIDE 2 MG/2ML
INJECTION, SOLUTION INTRAMUSCULAR; INTRAVENOUS AS NEEDED
Status: DISCONTINUED | OUTPATIENT
Start: 2022-05-03 | End: 2022-05-03 | Stop reason: SURG

## 2022-05-03 RX ORDER — LIDOCAINE HYDROCHLORIDE 20 MG/ML
INJECTION, SOLUTION INTRAVENOUS AS NEEDED
Status: DISCONTINUED | OUTPATIENT
Start: 2022-05-03 | End: 2022-05-03 | Stop reason: SURG

## 2022-05-03 RX ORDER — NEOSTIGMINE METHYLSULFATE 5 MG/5 ML
SYRINGE (ML) INTRAVENOUS AS NEEDED
Status: DISCONTINUED | OUTPATIENT
Start: 2022-05-03 | End: 2022-05-03 | Stop reason: SURG

## 2022-05-03 RX ORDER — PROPOFOL 10 MG/ML
INJECTION, EMULSION INTRAVENOUS AS NEEDED
Status: DISCONTINUED | OUTPATIENT
Start: 2022-05-03 | End: 2022-05-03 | Stop reason: SURG

## 2022-05-03 RX ADMIN — Medication 3 MG: at 09:13

## 2022-05-03 RX ADMIN — SODIUM CHLORIDE, POTASSIUM CHLORIDE, SODIUM LACTATE AND CALCIUM CHLORIDE 1000 ML: 600; 310; 30; 20 INJECTION, SOLUTION INTRAVENOUS at 06:57

## 2022-05-03 RX ADMIN — ACETAMINOPHEN 1000 MG: 500 TABLET ORAL at 12:11

## 2022-05-03 RX ADMIN — ONDANSETRON HYDROCHLORIDE 4 MG: 4 TABLET, FILM COATED ORAL at 17:31

## 2022-05-03 RX ADMIN — ONDANSETRON 4 MG: 2 INJECTION INTRAMUSCULAR; INTRAVENOUS at 09:54

## 2022-05-03 RX ADMIN — LORAZEPAM 0.5 MG: 2 INJECTION INTRAMUSCULAR; INTRAVENOUS at 20:45

## 2022-05-03 RX ADMIN — DEXTROSE AND SODIUM CHLORIDE 125 ML/HR: 5; 450 INJECTION, SOLUTION INTRAVENOUS at 09:54

## 2022-05-03 RX ADMIN — PHENAZOPYRIDINE HYDROCHLORIDE 200 MG: 100 TABLET ORAL at 06:30

## 2022-05-03 RX ADMIN — GLYCOPYRROLATE 0.4 MG: 0.2 INJECTION, SOLUTION INTRAMUSCULAR; INTRAVENOUS at 09:13

## 2022-05-03 RX ADMIN — CEFAZOLIN SODIUM 2 G: 2 SOLUTION INTRAVENOUS at 07:34

## 2022-05-03 RX ADMIN — OXYCODONE HYDROCHLORIDE 10 MG: 5 TABLET ORAL at 20:44

## 2022-05-03 RX ADMIN — LORAZEPAM 0.5 MG: 2 INJECTION INTRAMUSCULAR; INTRAVENOUS at 14:46

## 2022-05-03 RX ADMIN — DEXTROSE AND SODIUM CHLORIDE 125 ML/HR: 5; 450 INJECTION, SOLUTION INTRAVENOUS at 17:44

## 2022-05-03 RX ADMIN — ONDANSETRON 4 MG: 2 INJECTION INTRAMUSCULAR; INTRAVENOUS at 07:35

## 2022-05-03 RX ADMIN — HYDROMORPHONE HYDROCHLORIDE 0.5 MG: 1 INJECTION, SOLUTION INTRAMUSCULAR; INTRAVENOUS; SUBCUTANEOUS at 09:40

## 2022-05-03 RX ADMIN — MIDAZOLAM HYDROCHLORIDE 2 MG: 1 INJECTION, SOLUTION INTRAMUSCULAR; INTRAVENOUS at 07:35

## 2022-05-03 RX ADMIN — DEXAMETHASONE SODIUM PHOSPHATE 4 MG: 4 INJECTION, SOLUTION INTRAMUSCULAR; INTRAVENOUS at 07:35

## 2022-05-03 RX ADMIN — ACETAMINOPHEN 1000 MG: 500 TABLET ORAL at 20:43

## 2022-05-03 RX ADMIN — ROCURONIUM BROMIDE 40 MG: 10 INJECTION INTRAVENOUS at 07:35

## 2022-05-03 RX ADMIN — ROCURONIUM BROMIDE 10 MG: 10 INJECTION INTRAVENOUS at 08:44

## 2022-05-03 RX ADMIN — ROCURONIUM BROMIDE 10 MG: 10 INJECTION INTRAVENOUS at 08:13

## 2022-05-03 RX ADMIN — SCOPALAMINE 1 PATCH: 1 PATCH, EXTENDED RELEASE TRANSDERMAL at 06:29

## 2022-05-03 RX ADMIN — PROPOFOL 180 MG: 10 INJECTION, EMULSION INTRAVENOUS at 07:35

## 2022-05-03 RX ADMIN — GLYCOPYRROLATE 0.2 MG: 0.2 INJECTION, SOLUTION INTRAMUSCULAR; INTRAVENOUS at 08:12

## 2022-05-03 RX ADMIN — OXYCODONE HYDROCHLORIDE 5 MG: 5 TABLET ORAL at 12:11

## 2022-05-03 RX ADMIN — FENTANYL CITRATE 100 MCG: 50 INJECTION INTRAMUSCULAR; INTRAVENOUS at 07:35

## 2022-05-03 RX ADMIN — LIDOCAINE HYDROCHLORIDE 100 MG: 20 INJECTION, SOLUTION INTRAVENOUS at 07:35

## 2022-05-03 RX ADMIN — OXYCODONE HYDROCHLORIDE 5 MG: 5 TABLET ORAL at 17:31

## 2022-05-03 RX ADMIN — HALOPERIDOL LACTATE 0.5 MG: 5 INJECTION, SOLUTION INTRAMUSCULAR at 09:30

## 2022-05-03 NOTE — OP NOTE
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   Assist: ALFONSO Sherwood was responsible for performing the following activities: Retraction, Suction, Irrigation, Suturing, Closing and Placing Dressing and their skilled assistance was necessary for the success of this case.   Anesthesia: General   Estimated Blood Loss: 100 mls   Drains: Barron   ABx: cefazolin 2 gms   Specimens:  Uterus, cervix, and bilateral ovaries and tubes   Findings: Uterus enlarged with a left subserosal fibroid in the broad ligament with distortion of the uterus.  Endometriotic implants in the posterior cul-de-sac.  Left ovary and tube adherent to the left subserosal fibroid.  Right ovary with approximately 3 cm cyst.  Right ovary adherent to the right pelvic sidewall.   Complications: none   Indications:  See H&P     Description of Procedure:  After the appropriate time out and after adequate dosing of her anesthesia, the patient was prepped and draped in the usual sterile fashion.  The patient was placed in the dorsal lithotomy position using Quentin stirrups.  A barron catheter had been placed in the bladder for drainage during the procedure.  A weighted speculum was placed in the vagina.  The anterior lip of the cervix was grasped with a single tooth tenaculum.  The uterus was sounded.  The cervix was dilated.  Measurements were taken and the CHUCKIE manipulator was placed in the  usual fashion.  An infraumbilical skin incision was made with the knife and a 10 mm trocar was inserted using the Optiview without any complications.  The abdomen was insufflated with CO2 being sure to keep the pressure less than 15 mmHg.  Bilateral ancillary ports were placed with 5 mm port in the right lower quadrant and a 10 mm port in the left lower quadrant with the aid of transillumination and after identification of the inferior epigastric vessels.  The ports were placed under direct visualization without any complications.  The pelvis was explored with the above findings noted.  The ureters were identified bilaterally and noted to be out of the operative fields at all times.  Sharp lysis of adhesions was performed on the right freeing the right ovary from the pelvic sidewall without any complications.  The right ovary was grasped and pulled medially.  The infundibulopelvic ligament was cauterized and transected with the Harmonic scalpel.  Dissection was carried along the broad ligament to a level distal to the round ligament with the Harmonic scalpel.  The bladder flap was created.  The uterine vasculature was skeletonized, cauterized and cut with the Harmonic scalpel.  The left ovary was noted to be adherent to the left subserosal fibroid.  Sharp lysis of adhesions was performed freeing the left ovary without any complications.  A similar process was repeated on the patient's contralateral side without any complications.  There is noted to be an approximately 3 to 4 cm left broad ligament fibroid.  This was sharply dissected out.  The uterine vessels were identified.  Skeletonization was performed with the vessels cauterized and cut with the harmonic scalpel being sure to hug the uterus.  The uterus was elevated with the Thea manipulator in a cephalad fashion.  Once adequate dissection and development of the bladder flap had been created and a white fascial plane was noted, circumferential excision of the  specimen overlying the CHUCKIE cup in the vagina was carried out and the specimen was amputated, retrieved, and held in the vagina to maintain a pneumoperitoneum.  Bleeding points were cauterized.  The vaginal cuff was closed in a running fashion with #1 barbed Endostitch x 2.  Irrigation and aspiration was carried out.  No bleeding was noted.  The abdomen was released of CO2 and no bleeding was noted.  Instrumentation was removed.  Full desufflation was then carried out.  The incisions were inspected and noted to be hemostatic with minimal bovie electrocautery.  The incisions were closed with 4-0 nylon in an interrupted fashion.  Dressings were placed.  The barron catheter was removed.  The cystoscope was introduced.  The patient was noted to have bilateral ureteral jet flow of pyridium stained urine.  There was no evidence of bladder mucosal injury.  The barron catheter was re-anchored.  A vaginal pack was placed.  All instrument and sponge counts were correct at the end of the procedure.  There were no complications.  The patient tolerated the procedure well.  She was taken to the postoperative recovery room in stable condition.    Taniya Whiteside M.D.  5/3/2022  09:16 EDT

## 2022-05-03 NOTE — ANESTHESIA POSTPROCEDURE EVALUATION
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        Post Anesthesia Care and Evaluation    Patient location during evaluation: PACU  Patient participation: complete - patient participated  Level of consciousness: awake and alert  Pain score: 2  Pain management: satisfactory to patient  Airway patency: patent  Anesthetic complications: No anesthetic complications  PONV Status: none  Cardiovascular status: acceptable and stable  Respiratory status: acceptable  Hydration status: acceptable    Comments: Vitals signs as noted in nursing documentation as per protocol.

## 2022-05-03 NOTE — ANESTHESIA PROCEDURE NOTES
Airway  Urgency: elective    Date/Time: 5/3/2022 7:38 AM  Airway not difficult    General Information and Staff    Patient location during procedure: OR  CRNA/CAA: Jonah Aponte CRNA    Indications and Patient Condition  Indications for airway management: airway protection    Preoxygenated: yes  MILS maintained throughout  Mask difficulty assessment: 1 - vent by mask    Final Airway Details  Final airway type: endotracheal airway      Successful airway: ETT  Cuffed: yes   Successful intubation technique: direct laryngoscopy  Endotracheal tube insertion site: oral  Blade: Jad  Blade size: 3  ETT size (mm): 7.0  Cormack-Lehane Classification: grade I - full view of glottis  Placement verified by: chest auscultation and capnometry   Cuff volume (mL): 7  Measured from: lips  ETT/EBT  to lips (cm): 21  Number of attempts at approach: 1  Assessment: lips, teeth, and gum same as pre-op and atraumatic intubation    Additional Comments  Negative epigastric sounds, Breath sound equal bilaterally with symmetric chest rise and fall

## 2022-05-03 NOTE — INTERVAL H&P NOTE
H&P updated. The patient was examined and there are no changes other than patient desires removal of left ovary and tube if any abnormalities.

## 2022-05-03 NOTE — PLAN OF CARE
Goal Outcome Evaluation:  Plan of Care Reviewed With: patient           Outcome Evaluation: VSS, pt tolerating Ice chips PO, pt reports mild intermittent nausea, pt reports her pain is controlled with pain medication,

## 2022-05-04 VITALS
DIASTOLIC BLOOD PRESSURE: 79 MMHG | TEMPERATURE: 97.8 F | OXYGEN SATURATION: 95 % | RESPIRATION RATE: 18 BRPM | SYSTOLIC BLOOD PRESSURE: 119 MMHG | HEART RATE: 74 BPM

## 2022-05-04 LAB
DEPRECATED RDW RBC AUTO: 44.9 FL (ref 37–54)
ERYTHROCYTE [DISTWIDTH] IN BLOOD BY AUTOMATED COUNT: 13.9 % (ref 12.3–15.4)
HCT VFR BLD AUTO: 38.6 % (ref 34–46.6)
HGB BLD-MCNC: 12.2 G/DL (ref 12–15.9)
MCH RBC QN AUTO: 28.1 PG (ref 26.6–33)
MCHC RBC AUTO-ENTMCNC: 31.6 G/DL (ref 31.5–35.7)
MCV RBC AUTO: 88.9 FL (ref 79–97)
PLATELET # BLD AUTO: 293 10*3/MM3 (ref 140–450)
PMV BLD AUTO: 9.3 FL (ref 6–12)
RBC # BLD AUTO: 4.34 10*6/MM3 (ref 3.77–5.28)
WBC NRBC COR # BLD: 7 10*3/MM3 (ref 3.4–10.8)

## 2022-05-04 PROCEDURE — G0378 HOSPITAL OBSERVATION PER HR: HCPCS

## 2022-05-04 PROCEDURE — 85027 COMPLETE CBC AUTOMATED: CPT | Performed by: OBSTETRICS & GYNECOLOGY

## 2022-05-04 PROCEDURE — 63710000001 ONDANSETRON PER 8 MG: Performed by: OBSTETRICS & GYNECOLOGY

## 2022-05-04 RX ORDER — ZOLPIDEM TARTRATE 5 MG/1
5 TABLET ORAL NIGHTLY PRN
Qty: 20 TABLET | Refills: 0 | Status: ON HOLD | OUTPATIENT
Start: 2022-05-04 | End: 2022-06-30

## 2022-05-04 RX ORDER — OXYCODONE HYDROCHLORIDE 5 MG/1
5 TABLET ORAL EVERY 8 HOURS PRN
Qty: 15 TABLET | Refills: 0 | Status: SHIPPED | OUTPATIENT
Start: 2022-05-04 | End: 2022-06-03

## 2022-05-04 RX ORDER — IBUPROFEN 800 MG/1
800 TABLET ORAL EVERY 8 HOURS
Qty: 30 TABLET | Refills: 0 | Status: SHIPPED | OUTPATIENT
Start: 2022-05-04 | End: 2022-06-03

## 2022-05-04 RX ORDER — ACETAMINOPHEN 500 MG
1000 TABLET ORAL EVERY 8 HOURS
Qty: 30 TABLET | Refills: 0 | Status: SHIPPED | OUTPATIENT
Start: 2022-05-04

## 2022-05-04 RX ORDER — DOCUSATE SODIUM 100 MG/1
100 CAPSULE, LIQUID FILLED ORAL 2 TIMES DAILY PRN
Qty: 60 CAPSULE | Refills: 0 | Status: ON HOLD | OUTPATIENT
Start: 2022-05-04 | End: 2022-06-30

## 2022-05-04 RX ADMIN — OXYCODONE HYDROCHLORIDE 10 MG: 5 TABLET ORAL at 00:07

## 2022-05-04 RX ADMIN — IBUPROFEN 800 MG: 800 TABLET ORAL at 08:03

## 2022-05-04 RX ADMIN — OXYCODONE HYDROCHLORIDE 10 MG: 5 TABLET ORAL at 04:37

## 2022-05-04 RX ADMIN — ONDANSETRON HYDROCHLORIDE 4 MG: 4 TABLET, FILM COATED ORAL at 00:07

## 2022-05-04 RX ADMIN — ONDANSETRON HYDROCHLORIDE 4 MG: 4 TABLET, FILM COATED ORAL at 08:08

## 2022-05-04 RX ADMIN — ACETAMINOPHEN 1000 MG: 500 TABLET ORAL at 04:37

## 2022-05-04 RX ADMIN — DEXTROSE AND SODIUM CHLORIDE 125 ML/HR: 5; 450 INJECTION, SOLUTION INTRAVENOUS at 01:09

## 2022-05-04 NOTE — DISCHARGE SUMMARY
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         Brief History: Patient is a 41 y.o. female who underwent total laparoscopic hysterectomy, bilateral salpingo-oophorectomy, lysis of adhesions, cystoscopy yesterday.    Hospital Course: Unremarkable postop course. Patient is ambulating today, tolerating liquids, passing gas, voiding well. Pain well controlled. She feels ready for discharge later today         Pending Studies: Pending Labs     Order Current Status    Tissue Pathology Exam In process           Condition at discharge: good   Discharge Medications:    Discharge Medications      New Medications      Instructions Start Date   acetaminophen 500 MG tablet  Commonly known as: TYLENOL   1,000 mg, Oral, Every 8 Hours      docusate sodium 100 MG capsule   100 mg, Oral, 2 Times Daily PRN      ibuprofen 800 MG tablet  Commonly known as: ADVIL,MOTRIN   800 mg, Oral, Every 8 Hours         Continue These Medications      Instructions Start Date   amitriptyline 10 MG tablet  Commonly known as: ELAVIL   10 mg, Oral, 3 Times Daily      Emgality 120 MG/ML auto-injector pen  Generic drug: galcanezumab-gnlm   No dose, route, or frequency recorded.      levETIRAcetam 500 MG tablet  Commonly known as: KEPPRA   500 mg      pramipexole 0.25 MG tablet  Commonly known as: MIRAPEX   0.25 mg, Oral, 2 Times Daily      pramipexole 0.5 MG tablet  Commonly known as: MIRAPEX   0.5 mg, Oral, Daily      Trokendi  MG capsule sustained-release 24 hr  Generic drug: Topiramate ER   200 mg,  Daily      Vimpat 200 MG tablet  Generic drug: lacosamide   No dose, route, or frequency recorded.      Vitamin B 12 100 MCG lozenge   Oral         Stop These Medications    azithromycin 250 MG tablet  Commonly known as: ZITHROMAX     fluconazole 150 MG tablet  Commonly known as: DIFLUCAN     medroxyPROGESTERone 10 MG tablet  Commonly known as: Provera     ondansetron ODT 8 MG disintegrating tablet  Commonly known as: Zofran ODT           Discharge Disposition: Home or Self Care   Follow-up: Future Appointments   Date Time Provider Department Center   5/10/2022  1:10 PM Meredtih Malloy PA-C [facility] [place] [facility]   6/3/2022 10:15 AM Venkata Arcos MD [facility]   6/15/2022  8:15 AM [facility]          Time: Discharge 15 min    Follow up:   Future Appointments   Date Time Provider Department Center   5/10/2022  1:10 PM Meredith Malloy PA-C [facility] [place] [facility]   6/3/2022 10:15 AM Venkata Arcos MD [facility]   6/15/2022  8:15 AM [facility] 1 [facility]       This note has been electronically signed.    Meredith Malloy PA-C   May 4, 2022

## 2022-05-04 NOTE — PLAN OF CARE
Goal Outcome Evaluation:              Outcome Evaluation: VSS, adequate pain control,no nausea or vomiting,continue with care, anticipate d/c

## 2022-05-06 ENCOUNTER — APPOINTMENT (OUTPATIENT)
Dept: MRI IMAGING | Facility: HOSPITAL | Age: 42
End: 2022-05-06

## 2022-05-06 ENCOUNTER — TELEPHONE (OUTPATIENT)
Dept: OBSTETRICS AND GYNECOLOGY | Facility: CLINIC | Age: 42
End: 2022-05-06

## 2022-05-06 ENCOUNTER — HOSPITAL ENCOUNTER (EMERGENCY)
Facility: HOSPITAL | Age: 42
Discharge: HOME | End: 2022-05-06
Attending: EMERGENCY MEDICINE | Admitting: EMERGENCY MEDICINE

## 2022-05-06 VITALS
TEMPERATURE: 98.4 F | DIASTOLIC BLOOD PRESSURE: 97 MMHG | OXYGEN SATURATION: 96 % | HEIGHT: 61 IN | SYSTOLIC BLOOD PRESSURE: 136 MMHG | RESPIRATION RATE: 18 BRPM | WEIGHT: 201 LBS | HEART RATE: 74 BPM | BODY MASS INDEX: 37.95 KG/M2

## 2022-05-06 DIAGNOSIS — M62.81 RIGHT-SIDED MUSCLE WEAKNESS: ICD-10-CM

## 2022-05-06 DIAGNOSIS — R22.0 RIGHT FACIAL SWELLING: Primary | ICD-10-CM

## 2022-05-06 LAB
ALBUMIN SERPL-MCNC: 3.8 G/DL (ref 3.5–5.2)
ALBUMIN/GLOB SERPL: 1.2 G/DL
ALP SERPL-CCNC: 99 U/L (ref 39–117)
ALT SERPL W P-5'-P-CCNC: 11 U/L (ref 1–33)
ANION GAP SERPL CALCULATED.3IONS-SCNC: 10.7 MMOL/L (ref 5–15)
APTT PPP: 32.6 SECONDS (ref 70–100)
AST SERPL-CCNC: 24 U/L (ref 1–32)
BASOPHILS # BLD AUTO: 0.03 10*3/MM3 (ref 0–0.2)
BASOPHILS NFR BLD AUTO: 0.5 % (ref 0–1.5)
BILIRUB SERPL-MCNC: 0.3 MG/DL (ref 0–1.2)
BUN SERPL-MCNC: 5 MG/DL (ref 6–20)
BUN/CREAT SERPL: 7.4 (ref 7–25)
CALCIUM SPEC-SCNC: 8.8 MG/DL (ref 8.6–10.5)
CHLORIDE SERPL-SCNC: 103 MMOL/L (ref 98–107)
CO2 SERPL-SCNC: 24.3 MMOL/L (ref 22–29)
CREAT SERPL-MCNC: 0.68 MG/DL (ref 0.57–1)
DEPRECATED RDW RBC AUTO: 42.4 FL (ref 37–54)
EGFRCR SERPLBLD CKD-EPI 2021: 113.1 ML/MIN/1.73
EOSINOPHIL # BLD AUTO: 0.13 10*3/MM3 (ref 0–0.4)
EOSINOPHIL NFR BLD AUTO: 2.3 % (ref 0.3–6.2)
ERYTHROCYTE [DISTWIDTH] IN BLOOD BY AUTOMATED COUNT: 13.7 % (ref 12.3–15.4)
GLOBULIN UR ELPH-MCNC: 3.3 GM/DL
GLUCOSE SERPL-MCNC: 98 MG/DL (ref 65–99)
HCT VFR BLD AUTO: 37.1 % (ref 34–46.6)
HGB BLD-MCNC: 12.3 G/DL (ref 12–15.9)
HOLD SPECIMEN: NORMAL
HOLD SPECIMEN: NORMAL
IMM GRANULOCYTES # BLD AUTO: 0.02 10*3/MM3 (ref 0–0.05)
IMM GRANULOCYTES NFR BLD AUTO: 0.4 % (ref 0–0.5)
INR PPP: 0.96 (ref 0.9–1.1)
LYMPHOCYTES # BLD AUTO: 1.3 10*3/MM3 (ref 0.7–3.1)
LYMPHOCYTES NFR BLD AUTO: 23.2 % (ref 19.6–45.3)
MCH RBC QN AUTO: 28.5 PG (ref 26.6–33)
MCHC RBC AUTO-ENTMCNC: 33.2 G/DL (ref 31.5–35.7)
MCV RBC AUTO: 86.1 FL (ref 79–97)
MONOCYTES # BLD AUTO: 0.25 10*3/MM3 (ref 0.1–0.9)
MONOCYTES NFR BLD AUTO: 4.5 % (ref 5–12)
NEUTROPHILS NFR BLD AUTO: 3.87 10*3/MM3 (ref 1.7–7)
NEUTROPHILS NFR BLD AUTO: 69.1 % (ref 42.7–76)
NRBC BLD AUTO-RTO: 0 /100 WBC (ref 0–0.2)
PLATELET # BLD AUTO: 293 10*3/MM3 (ref 140–450)
PMV BLD AUTO: 9.1 FL (ref 6–12)
POTASSIUM SERPL-SCNC: 3.6 MMOL/L (ref 3.5–5.2)
PROT SERPL-MCNC: 7.1 G/DL (ref 6–8.5)
PROTHROMBIN TIME: 13.1 SECONDS (ref 12.5–14.5)
RBC # BLD AUTO: 4.31 10*6/MM3 (ref 3.77–5.28)
SODIUM SERPL-SCNC: 138 MMOL/L (ref 136–145)
TROPONIN T SERPL-MCNC: <0.01 NG/ML (ref 0–0.03)
WBC NRBC COR # BLD: 5.6 10*3/MM3 (ref 3.4–10.8)
WHOLE BLOOD HOLD SPECIMEN: NORMAL
WHOLE BLOOD HOLD SPECIMEN: NORMAL

## 2022-05-06 PROCEDURE — 85730 THROMBOPLASTIN TIME PARTIAL: CPT

## 2022-05-06 PROCEDURE — 70544 MR ANGIOGRAPHY HEAD W/O DYE: CPT

## 2022-05-06 PROCEDURE — 99284 EMERGENCY DEPT VISIT MOD MDM: CPT

## 2022-05-06 PROCEDURE — 84484 ASSAY OF TROPONIN QUANT: CPT

## 2022-05-06 PROCEDURE — 70551 MRI BRAIN STEM W/O DYE: CPT

## 2022-05-06 PROCEDURE — 85025 COMPLETE CBC W/AUTO DIFF WBC: CPT

## 2022-05-06 PROCEDURE — 25010000002 KETOROLAC TROMETHAMINE PER 15 MG

## 2022-05-06 PROCEDURE — 99283 EMERGENCY DEPT VISIT LOW MDM: CPT

## 2022-05-06 PROCEDURE — 36415 COLL VENOUS BLD VENIPUNCTURE: CPT

## 2022-05-06 PROCEDURE — 96374 THER/PROPH/DIAG INJ IV PUSH: CPT

## 2022-05-06 PROCEDURE — 70547 MR ANGIOGRAPHY NECK W/O DYE: CPT

## 2022-05-06 PROCEDURE — 25010000002 ONDANSETRON PER 1 MG

## 2022-05-06 PROCEDURE — 96375 TX/PRO/DX INJ NEW DRUG ADDON: CPT

## 2022-05-06 PROCEDURE — 85610 PROTHROMBIN TIME: CPT

## 2022-05-06 PROCEDURE — 99204 OFFICE O/P NEW MOD 45 MIN: CPT | Performed by: PSYCHIATRY & NEUROLOGY

## 2022-05-06 PROCEDURE — 80053 COMPREHEN METABOLIC PANEL: CPT

## 2022-05-06 PROCEDURE — 93005 ELECTROCARDIOGRAM TRACING: CPT

## 2022-05-06 RX ORDER — OXYCODONE AND ACETAMINOPHEN 10; 325 MG/1; MG/1
1 TABLET ORAL ONCE
Status: COMPLETED | OUTPATIENT
Start: 2022-05-06 | End: 2022-05-06

## 2022-05-06 RX ORDER — SODIUM CHLORIDE 0.9 % (FLUSH) 0.9 %
10 SYRINGE (ML) INJECTION AS NEEDED
Status: DISCONTINUED | OUTPATIENT
Start: 2022-05-06 | End: 2022-05-06 | Stop reason: HOSPADM

## 2022-05-06 RX ORDER — CLINDAMYCIN HYDROCHLORIDE 150 MG/1
450 CAPSULE ORAL ONCE
Status: COMPLETED | OUTPATIENT
Start: 2022-05-06 | End: 2022-05-06

## 2022-05-06 RX ORDER — CLINDAMYCIN HYDROCHLORIDE 150 MG/1
450 CAPSULE ORAL
Qty: 90 CAPSULE | Refills: 0 | Status: SHIPPED | OUTPATIENT
Start: 2022-05-06 | End: 2022-05-16

## 2022-05-06 RX ORDER — KETOROLAC TROMETHAMINE 30 MG/ML
30 INJECTION, SOLUTION INTRAMUSCULAR; INTRAVENOUS ONCE
Status: COMPLETED | OUTPATIENT
Start: 2022-05-06 | End: 2022-05-06

## 2022-05-06 RX ORDER — ONDANSETRON 2 MG/ML
4 INJECTION INTRAMUSCULAR; INTRAVENOUS ONCE
Status: COMPLETED | OUTPATIENT
Start: 2022-05-06 | End: 2022-05-06

## 2022-05-06 RX ORDER — ASPIRIN 81 MG/1
324 TABLET, CHEWABLE ORAL ONCE
Status: COMPLETED | OUTPATIENT
Start: 2022-05-06 | End: 2022-05-06

## 2022-05-06 RX ADMIN — CLINDAMYCIN HYDROCHLORIDE 450 MG: 150 CAPSULE ORAL at 15:19

## 2022-05-06 RX ADMIN — KETOROLAC TROMETHAMINE 30 MG: 30 INJECTION, SOLUTION INTRAMUSCULAR at 15:19

## 2022-05-06 RX ADMIN — ONDANSETRON 4 MG: 2 INJECTION INTRAMUSCULAR; INTRAVENOUS at 13:33

## 2022-05-06 RX ADMIN — ASPIRIN 324 MG: 81 TABLET, CHEWABLE ORAL at 13:33

## 2022-05-06 RX ADMIN — OXYCODONE HYDROCHLORIDE AND ACETAMINOPHEN 1 TABLET: 10; 325 TABLET ORAL at 15:19

## 2022-05-06 NOTE — DISCHARGE INSTRUCTIONS
Today your MRIs of your brain and neck were negative.  No presence of a stroke or TIA.  Your symptoms may be related to your history of migraines and/or epilepsy.  Continue to monitor for improvement, if you notice that the symptoms are persisting or worsening, he may return to the emergency department for further work-up.  Otherwise, you may need to see the neurologist who manages your epilepsy for reevaluation. Keep your appointment with Dr. Whiteside on Tuesday.  Take the clindamycin to help with the dental infection that is most likely causing the swelling to your right cheek.  You may take up to 2 tablets of your oxycodone to help with pain if needed which is a total of 10 mg.

## 2022-05-06 NOTE — ED PROVIDER NOTES
Subjective   Patient is a 41-year-old female here today with numbness and swelling to her face, also has weakness to her right arm.  She states that she woke up this morning at approximately 5 AM with the symptoms.  Did not have any symptoms when she went to bed last night.  The swelling and increase sensation is to the right side of her face.  She notes some changes to her vision and describes it as double and blurry.  Right arm is also weak and the sensation is decreased in comparison to the left arm.  She also notes some decrease sensation to her left leg.  She has a mild headache and some light sensitivity.  She has a history of epilepsy and takes Vimpat and topiramate.   states that the patient was advised to not take her medication prior to her surgery and missed her doses on Monday and Tuesday morning.  She also has a history of migraines and takes Emgality and amitriptyline.  Reports a possible stroke or TIA in October of last year where she was treated at .  She states that she was given tPA but never officially received the diagnosis of a stroke or TIA but her symptoms improved.        Review of Systems   Constitutional: Negative for chills, fatigue and fever.   HENT: Negative for congestion, dental problem, ear pain, postnasal drip, rhinorrhea, sinus pressure, sinus pain, sneezing and sore throat.    Eyes: Positive for visual disturbance (blurred).   Respiratory: Negative for cough and shortness of breath.    Cardiovascular: Negative for chest pain, palpitations and leg swelling.   Gastrointestinal: Negative for abdominal pain, diarrhea, nausea and vomiting.   Genitourinary: Negative for dysuria and flank pain.   Musculoskeletal: Negative for back pain.   Neurological: Positive for facial asymmetry, weakness, numbness and headaches. Negative for dizziness, syncope and light-headedness.   All other systems reviewed and are negative.      Past Medical History:   Diagnosis Date   • Anemia    •  Anxiety    • Bleeding disorder (HCC)     Free bleeding but no hemophelia   • Clotting disorder (HCC)    • Ear piercing    • Epilepsy (HCC)     last seizure 3/2022   • GERD (gastroesophageal reflux disease)    • History of Holter monitoring 01/18/2022   • Hx of echocardiogram 12/10/2021   • Irregular heart beat    • Kidney stone    • Murmur    • Ovarian cyst    • Polycystic ovary syndrome    • PONV (postoperative nausea and vomiting)    • Seasonal allergies    • Spinal headache    • Syncope 2021       Allergies   Allergen Reactions   • Amoxicillin Rash     Patient reported no issue with amoxcillin-clavulanate 875 mg-125 mg twice daily for 10 days completed in 6/2021  Patient reported no issue with amoxcillin-clavulanate 875 mg-125 mg twice daily for 10 days completed in 6/2021         Past Surgical History:   Procedure Laterality Date   • CHOLECYSTECTOMY     • DILATATION AND CURETTAGE     • EAR TUBES     • ENDOSCOPY     • EXPLORATORY LAPAROTOMY     • GASTRIC BYPASS  2017   • GASTRIC BYPASS     • TONSILLECTOMY     • TOTAL LAPAROSCOPIC HYSTERECTOMY Right 5/3/2022    Procedure: TOTAL LAPAROSCOPIC HYSTERECTOMY,  BILATERTAL SALPINGO-OOPHORECTOMY, CYSTOCOPY;  Surgeon: Taniya Whiteside MD;  Location: [facility];  Service: Obstetrics/Gynecology;  Laterality: Right;       Family History   Problem Relation Age of Onset   • Heart failure Father    • Bleeding Disorder Father    • Heart disease Sister    • Clotting disorder Sister        Social History     Socioeconomic History   • Marital status:    Tobacco Use   • Smoking status: Never Smoker   • Smokeless tobacco: Never Used   Vaping Use   • Vaping Use: Never used   Substance and Sexual Activity   • Alcohol use: No   • Drug use: No   • Sexual activity: Defer     Partners: Male     Birth control/protection: None           Objective   Physical Exam  Vitals and nursing note reviewed.   Constitutional:       Appearance: Normal appearance. She is normal weight.   HENT:       Head: Normocephalic and atraumatic.      Jaw: There is normal jaw occlusion.        Right Ear: Tympanic membrane, ear canal and external ear normal.      Left Ear: Tympanic membrane, ear canal and external ear normal.      Nose:      Right Sinus: Maxillary sinus tenderness present.      Left Sinus: No maxillary sinus tenderness.      Mouth/Throat:      Mouth: Mucous membranes are moist.      Dentition: Dental caries present.      Pharynx: Oropharynx is clear.   Eyes:      Extraocular Movements: Extraocular movements intact.      Conjunctiva/sclera: Conjunctivae normal.      Pupils: Pupils are equal, round, and reactive to light.   Neck:      Vascular: No carotid bruit.   Cardiovascular:      Rate and Rhythm: Normal rate and regular rhythm.      Pulses: Normal pulses.      Heart sounds: Normal heart sounds.   Pulmonary:      Effort: Pulmonary effort is normal.      Breath sounds: Normal breath sounds.   Abdominal:      General: Abdomen is flat. Bowel sounds are normal. There is no distension.      Palpations: Abdomen is soft.      Tenderness: There is no abdominal tenderness.   Musculoskeletal:      Cervical back: Neck supple. No tenderness.      Right lower leg: No edema.      Left lower leg: No edema.   Lymphadenopathy:      Cervical: No cervical adenopathy.   Skin:     General: Skin is warm and dry.   Neurological:      General: No focal deficit present.      Mental Status: She is alert and oriented to person, place, and time.      GCS: GCS eye subscore is 4. GCS verbal subscore is 5. GCS motor subscore is 6.      Sensory: Sensory deficit (right face, RUE, RLE) present.      Motor: Motor function is intact.      Coordination: Coordination is intact.   Psychiatric:         Mood and Affect: Mood normal.         Behavior: Behavior normal.         Procedures           ED Course  ED Course as of 05/06/22 1529   Fri May 06, 2022   1221 Discussed patient with Dr. Ierland. [TA]   1232 Spoke with Neurologist Dr. Hyman  about patient. Would like to perform telehealth visit. [TA]   1300 Dr. Hyman completed exam.  Recommended obtaining MRI and MRA of head and neck.  Also recommends giving aspirin 325 mg.  Would like a call back after imaging results return. [TA]   1307 EKG interpreted by me reveals sinus rhythm rate 92.  Low voltage EKG with nonspecific T wave change.  No ectopy no ischemic changes. [PF]   1348 WBC: 5.60 [TA]   1348 RBC: 4.31 [TA]   1348 Hemoglobin: 12.3 [TA]   1348 Hematocrit: 37.1 [TA]   1348 Platelets: 293 [TA]   1348 Glucose: 98 [TA]   1348 BUN(!): 5 [TA]   1348 Creatinine: 0.68 [TA]   1348 Sodium: 138 [TA]   1348 Potassium: 3.6 [TA]   1348 eGFR: 113.1 [TA]   1348 PTT(!): 32.6 [TA]   1348 Protime: 13.1 [TA]   1348 INR: 0.96 [TA]   1348 Troponin T: <0.010 [TA]   1435 MRI Brain Without Contrast  IMPRESSION:  Unremarkable MRI of the brain, without gadolinium  administration. [TA]   1436 MRI Angiogram Head Without Contrast  IMPRESSION:  Unremarkable intracranial MR angiogram. [TA]   1436 MRI Angiogram Neck Without Contrast  IMPRESSION:  No evidence of cervical carotid stenosis [TA]   1441 Dr. Hyman updated on MRI/MRA results. No further need from a neurological standpoint. If she continues to have weakness, may need admission for a further evaluation. [TA]      ED Course User Index  [PF] Da Ireland W,   [TA] Marcell Mancini, APRN                                                 MDM  Number of Diagnoses or Management Options  Diagnosis management comments: Patient is a 41-year-old female here today with new onset swelling and decreased sensation to the right side of her face.  She also notes decrease sensation and strength to her right upper and lower extremities.  She does not appear to be in acute distress and vital signs are stable.  She does have noted swelling to the right maxillary area which does limit her ability to assess cranial nerve VII.  Sensation difference noted to right upper and lower extremities  in comparison to the left.  Also noted decreased strength to right upper and lower extremities.  Has a report of some blurred vision and a mild headache.  Labs obtained by nursing staff.  CBC, CMP, PT/INR, PTT, and troponin ordered.  Due to onset of symptoms at 0500 this morning, spoke with Dr. Ireland about radiologic images, he recommended notifying the on-call neurologist for their recommendation.    Spoke with Dr. Hyman with neurology and she performed a telehealth assessment.  She recommended obtaining MRI and MRA of the brain and neck.  Also to give aspirin 325 mg.  She reports that she has a low suspicion of a neurological cause at this time, but wants to obtain the radiology images to be certain.  She would like to be notified when the results return.    MRI and MRA of the brain and neck are negative.  Notify Dr. Hyman who feels that the patient's symptoms are most likely are not neurological.  But recommends that if the patient continues to have persisting or worsening weakness and sensation deficits for her to be admitted for observation.  Reassessed patient and she reports slight improvements in her sensation differences to the right side of her body.  She still has decreased strength to her right upper and lower extremities.  Her headache has slightly improved.  Discussed results with patient and , it is possible that she may be experiencing the symptoms due to her history of migraines or due to her history of epilepsy if she had a seizure during her sleep.    Patient and  feel comfortable to go home.  We will provide her with pain medication to help treat her headache and post op pain.  Will provide patient with antibiotics for the suspected right upper dental infection due to her poor dentition and presence of dental caries.  She has a follow-up appointment with Dr. Whiteside on Tuesday.  Patient is agreeable to the plan and is ready for discharge.       Amount and/or Complexity of Data  Reviewed  Clinical lab tests: reviewed and ordered  Tests in the radiology section of CPT®: reviewed and ordered  Tests in the medicine section of CPT®: ordered and reviewed  Discussion of test results with the performing providers: yes  Discuss the patient with other providers: yes    Patient Progress  Patient progress: stable      Final diagnoses:   Right facial swelling   Right-sided muscle weakness       ED Disposition  ED Disposition     ED Disposition   Discharge    Condition   Stable    Comment   --             Christianne Elizalde, DAVID  [address] [place], [place] [id number] [phone]    Schedule an appointment as soon as possible for a visit   As needed         Medication List      New Prescriptions    clindamycin 150 MG capsule  Commonly known as: CLEOCIN  Take 3 capsules by mouth 3 (Three) Times a Day With Meals for 10 days.        Changed    pramipexole 0.5 MG tablet  Commonly known as: MIRAPEX  What changed: Another medication with the same name was removed. Continue taking this medication, and follow the directions you see here.        Stop    levETIRAcetam 500 MG tablet  Commonly known as: KEPPRA           Where to Get Your Medications      These medications were sent to [facility] [place], [place] - [address] [place] [address] AT  & [facility] - [phone]  - [phone] FX  [facility] [address]    Phone: [phone]   · clindamycin 150 MG capsule          Marcell Mancini, DAVID  05/06/22 7033

## 2022-05-06 NOTE — TELEPHONE ENCOUNTER
Patient called and advised she is 3 days post op hysterectomy, patient complains of severe pain and numbness and tingling in one side of her face. Discussed with patient risk of blood clot after surgery and advised patient to go to the ER for further evaluation. Patient agreed she would come on to ER.

## 2022-05-06 NOTE — CONSULTS
Stroke Consult Note    Patient Name: Luz Maria Andrade   MRN: [id number]  Age: 41 y.o.  Sex: female  : 1980    Primary Care Physician: Christianne Elizalde APRN  Referring Physician:  No ref. provider found    TIME STROKE TEAM CALLED: 12:30 EST     TIME PATIENT SEEN: 12:45 EST    Handedness: Right  Race: White     Chief Complaint/Reason for Consultation: Right sided weakness    HPI:   Ms. Andrade is a 42 yo female with history of epilepsy and anxiety who presents with acute onset right sided weakness. LKN bedtime the night prior to presentation. Patient reports she noted her right arm was weak when she tried to grab her cup upon awakening this morning. She also reports right leg weakness, tingling sensation in the right extremities and blurred vision. She denies double vision, vision loss, speech changes, focal weakness or sensory loss in the left face or extremities.     Last Known Normal Date/Time: around 9 pm the night prior to presentation      Review of Systems   Past Medical History:   Diagnosis Date   • Anemia    • Anxiety    • Bleeding disorder (HCC)     Free bleeding but no hemophelia   • Clotting disorder (HCC)    • Ear piercing    • Epilepsy (HCC)     last seizure 3/2022   • GERD (gastroesophageal reflux disease)    • History of Holter monitoring 2022   • Hx of echocardiogram 12/10/2021   • Irregular heart beat    • Kidney stone    • Murmur    • Ovarian cyst    • Polycystic ovary syndrome    • PONV (postoperative nausea and vomiting)    • Seasonal allergies    • Spinal headache    • Syncope      Past Surgical History:   Procedure Laterality Date   • CHOLECYSTECTOMY     • DILATATION AND CURETTAGE     • EAR TUBES     • ENDOSCOPY     • EXPLORATORY LAPAROTOMY     • GASTRIC BYPASS     • GASTRIC BYPASS     • TONSILLECTOMY     • TOTAL LAPAROSCOPIC HYSTERECTOMY Right 5/3/2022    Procedure: TOTAL LAPAROSCOPIC HYSTERECTOMY,  BILATERTAL SALPINGO-OOPHORECTOMY, CYSTOCOPY;  Surgeon: Taniya Whiteside MD;   Location: [facility];  Service: Obstetrics/Gynecology;  Laterality: Right;     Family History   Problem Relation Age of Onset   • Heart failure Father    • Bleeding Disorder Father    • Heart disease Sister    • Clotting disorder Sister      Social History     Socioeconomic History   • Marital status:    Tobacco Use   • Smoking status: Never Smoker   • Smokeless tobacco: Never Used   Vaping Use   • Vaping Use: Never used   Substance and Sexual Activity   • Alcohol use: No   • Drug use: No   • Sexual activity: Defer     Partners: Male     Birth control/protection: None     Allergies   Allergen Reactions   • Amoxicillin Rash     Patient reported no issue with amoxcillin-clavulanate 875 mg-125 mg twice daily for 10 days completed in 6/2021  Patient reported no issue with amoxcillin-clavulanate 875 mg-125 mg twice daily for 10 days completed in 6/2021       Prior to Admission medications    Medication Sig Start Date End Date Taking? Authorizing Provider   acetaminophen (TYLENOL) 500 MG tablet Take 2 tablets by mouth Every 8 (Eight) Hours. 5/4/22   Meredith Malloy PA-C   amitriptyline (ELAVIL) 10 MG tablet Take 10 mg by mouth 3 (Three) Times a Day.    ProviderMelvina MD   Cyanocobalamin (VITAMIN B 12) 100 MCG lozenge Take  by mouth.    ProviderMelvina MD   docusate sodium (COLACE) 100 MG capsule Take 1 capsule by mouth 2 (Two) Times a Day As Needed for Constipation. 5/4/22   Meredith Malloy PA-C   Emgality 120 MG/ML auto-injector pen  11/15/21   Melvina Napier MD   ibuprofen (ADVIL,MOTRIN) 800 MG tablet Take 1 tablet by mouth Every 8 (Eight) Hours. 5/4/22   Meredith Malloy PA-C   levETIRAcetam (KEPPRA) 500 MG tablet Take 500 mg by mouth.  Patient not taking: No sig reported    Melvina Napier MD   oxyCODONE (Roxicodone) 5 MG immediate release tablet Take 1 tablet by mouth Every 8 (Eight) Hours As Needed for Moderate Pain  or Severe Pain . 5/4/22   Taniya Whiteside MD   pramipexole  (MIRAPEX) 0.25 MG tablet Take 0.25 mg by mouth 2 (Two) Times a Day.    ProviderMelvina MD   pramipexole (MIRAPEX) 0.5 MG tablet Take 0.5 mg by mouth Daily.    Melvina Napier MD   Topiramate ER (Trokendi XR) 200 MG capsule sustained-release 24 hr Take 200 mg by mouth Daily.  Patient not taking: No sig reported    Melvina Napier MD   Vimpat 200 MG tablet  11/4/21   Melvina Napier MD   zolpidem (AMBIEN) 5 MG tablet Take 1 tablet by mouth At Night As Needed for Sleep. 5/4/22   Taniya Whiteside MD       Temp:  [98.4 °F (36.9 °C)] 98.4 °F (36.9 °C)  Heart Rate:  [75] 75  Resp:  [20] 20  BP: (184)/(108) 184/108  Neurological Exam  General Appearance: In no apparent distress    Neurological:  Mental status: Awake, Alert and Oriented to name, date, location and birthdate. Following commands.    Cranial Nerves:  Visual fields: Full to confrontation bilaterally  Eye motility: Full motility both eyes all directions  Facial sensation: Decreased sensation to light touch and vibration in the right face  Facial motor: No facial droop or facial asymmetry  Tongue: No tongue deviation     Motor: Right arm drifts before 10 seconds and right left drifts before 5 seconds but does not hiot the bed, of note, no pronator drift noted    Sensation: Decrease sensation to light touch in the right arm and leg.    Coordination:  Finger to nose: Intact  Gait: Not tested      Physical Exam    Acute Stroke Data    Alteplase (tPA) Inclusion / Exclusion Criteria    Time: 12:57 EDT  Person Administering Scale: Mia Reagan MD    Inclusion Criteria  [x]   18 years of age or greater   []   Onset of symptoms < 4.5 hours before beginning treatment (stroke onset = time patient was last seen well or without symptoms).   []   Diagnosis of acute ischemic stroke causing measurable disabling deficit (Complete Hemianopia, Any Aphasia, Visual or Sensory Extinction, Any weakness limiting sustained effort against gravity)   []   Any  remaining deficit considered potentially disabling in view of patient and practitioner   Exclusion criteria (Do not proceed with Alteplase if any are checked under exclusion criteria)  [x]   Onset unknown or GREATER than 4.5 hours   []   ICH on CT/MRI   []   CT demonstrates hypodensity representing acute or subacute infarct   []   Significant head trauma or prior stroke in the previous 3 months   []   Symptoms suggestive of subarachnoid hemorrhage   []   History of un-ruptured intracranial aneurysm GREATER than 10 mm   []   Recent intracranial or intraspinal surgery within the last 3 months   []   Arterial puncture at a non-compressible site in the previous 7 days   []   Active internal bleeding   []   Acute bleeding tendency   []   Platelet count LESS than 100,000 for known hematological diseases such as leukemia, thrombocytopenia or chronic cirrhosis   []   Current use of anticoagulant with INR GREATER than 1.7 or PT GREATER than 15 seconds, aPTT GREATER than 40 seconds   []   Heparin received within 48 hours, resulting in abnormally elevated aPTT GREATER than upper limit of normal   []   Current use of direct thrombin inhibitors or direct factor Xa inhibitors in the past 48 hours   []   Elevated blood pressure refractory to treatment (systolic GREATER than 185 mm/Hg or diastolic  GREATER than 110 mm/Hg   []   Suspected infective endocarditis and aortic arch dissection   []   Current use of therapeutic treatment dose of low-molecular-weight heparin (LMWH) within the previous 24 hours   []   Structural GI malignancy or bleed   Relative exclusion for all patients  []   Only minor non-disabling symptoms   []   Pregnancy   []   Seizure at onset with postictal residual neurological impairments   []   Major surgery or previous trauma within past 14 days   []   History of previous spontaneous ICH, intracranial neoplasm, or AV malformation   []   Postpartum (within previous 14 days)   []   Recent GI or urinary tract  hemorrhage (within previous 21 days)   []   Recent acute MI (within previous 3 months)   []   History of un-ruptured intracranial aneurysm LESS than 10 mm   []   History of ruptured intracranial aneurysm   []   Blood glucose LESS than 50 mg/dL (2.7 mmol/L)   []   Dural puncture within the last 7 days   []   Known GREATER than 10 cerebral microbleeds   Additional exclusions for patients with symptoms onset between 3 and 4.5 hours.  []   Age > 80.   []   On any anticoagulants regardless of INR  >>> Warfarin (Coumadin), Heparin, Enoxaparin (Lovenox), fondaparinux (Arixtra), bivalirudin (Angiomax), Argatroban, dabigatran (Pradaxa), rivaroxaban (Xarelto), or apixaban (Eliquis)   []   Severe stroke (NIHSS > 25).   []   History of BOTH diabetes and previous ischemic stroke.   []   The risks and benefits have been discussed with the patient or family related to the administration of IV Alteplase for stroke symptoms.   []   I have discussed and reviewed the patient's case and imaging with the attending prior to IV Alteplase.   No tpa candidate Time Alteplase administered       [facility] Meds:  Scheduled-    Infusions-     PRNs- •  sodium chloride    Functional Status Prior to Current Stroke/Emily Score: 0    NIH Stroke Scale  Time: 12:57 EDT  Person Administering Scale: Mia Reagan MD    1a  Level of consciousness: 0=alert; keenly responsive   1b. LOC questions:  0=Performs both tasks correctly   1c. LOC commands: 0=Performs both tasks correctly   2.  Best Gaze: 0=normal   3.  Visual: 0=No visual loss   4. Facial Palsy: 0=Normal symmetric movement   5a.  Motor left arm: 0=No drift, limb holds 90 (or 45) degrees for full 10 seconds   5b.  Motor right arm: 1=Drift, limb holds 90 (or 45) degrees but drifts down before full 10 seconds: does not hit bed   6a. motor left le=No drift, limb holds 90 (or 45) degrees for full 10 seconds   6b  Motor right le=Drift, limb holds 90 (or 45) degrees but drifts down  before full 10 seconds: does not hit bed   7. Limb Ataxia: 0=Absent   8.  Sensory: 1=Mild to moderate sensory loss; patient feels pinprick is less sharp or is dull on the affected side; there is a loss of superficial pain with pinprick but patient is aware She is being touched   9. Best Language:  0=No aphasia, normal   10. Dysarthria: 0=Normal   11. Extinction and Inattention: 0=No abnormality    Total:   3       Results Reviewed:  I have personally reviewed current lab, radiology, and data and agree with results.    Results for orders placed during the hospital encounter of 12/10/21    Adult Transthoracic Echo Complete W/ Cont if Necessary Per Protocol    Interpretation Summary  · Left ventricular ejection fraction appears to be 61 - 65%. Left ventricular systolic function is normal.  · The cardiac valves are structurally and functionally within normal limits.       Assessment/Plan:    40 yo female with paucity of vascular risk factors who presents with right sided numbness/weakness. LKN more than 12 hours prior to presentation. NIHSS 3 for subjective sensory changes in the right face/arm/leg and right extremities drift. Exam with some functional findings (decrease sensation to vibration in the right face and right arm drift with no pronation). Differential diagnosis includes small left brain subcortical stroke versus a functional disorder. Patient is not a candidate for acute cerebral reperfusion therapies given outside time window for thrombolytics and mild deficits. Unlikely a large vessel occlusion given mild deficits with no cortical signs.      1. Left brain subcortical stroke versus psychigenic etiologies    - Admit to Teleunit  - MRI brain without contrast, MRA head/neck  - If MRI brain positive for stroke:  TTE with bubble study  - Check EKG and troponin  - Lipid profile and A1C  - B12, Folate, UA  - Hold home antihypertensive except beta blocker. Can continue low dose beta blocker.  - Permissive  hypertension up to 220/110 mmhg. PRN labetalol for SBP > 220 mmhg  - IVF with 0.9% NS at 75 cc/hour  - Aspirin 325 mg daily. Consider 300 mg OR if patient can't tolerate PO  - May consider Atorvastatin 40 mg daily and adjust according to LDL level  - Stroke education to patient and family  - PT/OT and speech evaluation and treatment  - Keep POC glucose between < 180. Avoid hypoglycemia.   -Keep Normothermic. PRN tylenol of T > 100.4.   - DVT PPX      Mia Yenni Reagan MD  May 6, 2022  12:57 EDT

## 2022-05-10 ENCOUNTER — OFFICE VISIT (OUTPATIENT)
Dept: OBSTETRICS AND GYNECOLOGY | Facility: CLINIC | Age: 42
End: 2022-05-10

## 2022-05-10 VITALS
BODY MASS INDEX: 38.36 KG/M2 | WEIGHT: 203.2 LBS | HEIGHT: 61 IN | SYSTOLIC BLOOD PRESSURE: 134 MMHG | DIASTOLIC BLOOD PRESSURE: 88 MMHG

## 2022-05-10 DIAGNOSIS — Z98.890 POSTOPERATIVE NAUSEA AND VOMITING: ICD-10-CM

## 2022-05-10 DIAGNOSIS — R11.2 POSTOPERATIVE NAUSEA AND VOMITING: ICD-10-CM

## 2022-05-10 DIAGNOSIS — Z09 POSTOPERATIVE FOLLOW-UP: Primary | ICD-10-CM

## 2022-05-10 LAB
LAB AP CASE REPORT: NORMAL
PATH REPORT.FINAL DX SPEC: NORMAL

## 2022-05-10 PROCEDURE — 99024 POSTOP FOLLOW-UP VISIT: CPT | Performed by: PHYSICIAN ASSISTANT

## 2022-05-10 RX ORDER — PROMETHAZINE HYDROCHLORIDE 25 MG/1
25 TABLET ORAL EVERY 6 HOURS PRN
Qty: 24 TABLET | Refills: 0 | Status: SHIPPED | OUTPATIENT
Start: 2022-05-10 | End: 2022-06-03

## 2022-05-10 NOTE — PROGRESS NOTES
Subjective   Chief Complaint   Patient presents with   • Post-op     One week post-op TLH-BSO, sutures removed and steri-strips applied, C/O vomiting x one week        Luz Maria Andrade is a 41 y.o. year old  presenting to be seen for post op visit  She is one week post op TLH BSO. Reports has had some nausea and vomiting since Thursday the day after discharge.  She is tolerating jello, pudding and crackers. Has not tolerated heavier meals. Has been using Phenergan but almost out of Phenergan  Having normal bowel and bladder function. Passing gas and normal bowel movements  No fever or chills. Post op pain well managed. Has not taken oxycodone for 2 days  Pathology benign      Past Medical History:   Diagnosis Date   • Anemia    • Anxiety    • Bleeding disorder (HCC)     Free bleeding but no hemophelia   • Clotting disorder (HCC)    • Ear piercing    • Epilepsy (HCC)     last seizure 3/2022   • GERD (gastroesophageal reflux disease)    • History of Holter monitoring 2022   • Hx of echocardiogram 12/10/2021   • Irregular heart beat    • Kidney stone    • Murmur    • Ovarian cyst    • Polycystic ovary syndrome    • PONV (postoperative nausea and vomiting)    • Seasonal allergies    • Spinal headache    • Syncope         Current Outpatient Medications:   •  acetaminophen (TYLENOL) 500 MG tablet, Take 2 tablets by mouth Every 8 (Eight) Hours., Disp: 30 tablet, Rfl: 0  •  amitriptyline (ELAVIL) 10 MG tablet, Take 10 mg by mouth 3 (Three) Times a Day., Disp: , Rfl:   •  clindamycin (CLEOCIN) 150 MG capsule, Take 3 capsules by mouth 3 (Three) Times a Day With Meals for 10 days., Disp: 90 capsule, Rfl: 0  •  Cyanocobalamin (VITAMIN B 12) 100 MCG lozenge, Take  by mouth., Disp: , Rfl:   •  docusate sodium (COLACE) 100 MG capsule, Take 1 capsule by mouth 2 (Two) Times a Day As Needed for Constipation., Disp: 60 capsule, Rfl: 0  •  Emgality 120 MG/ML auto-injector pen, , Disp: , Rfl:   •  ibuprofen  (ADVIL,MOTRIN) 800 MG tablet, Take 1 tablet by mouth Every 8 (Eight) Hours., Disp: 30 tablet, Rfl: 0  •  oxyCODONE (Roxicodone) 5 MG immediate release tablet, Take 1 tablet by mouth Every 8 (Eight) Hours As Needed for Moderate Pain  or Severe Pain ., Disp: 15 tablet, Rfl: 0  •  pramipexole (MIRAPEX) 0.5 MG tablet, Take 0.5 mg by mouth Daily., Disp: , Rfl:   •  Topiramate ER (Trokendi XR) 200 MG capsule sustained-release 24 hr, Take 200 mg by mouth Daily., Disp: , Rfl:   •  Vimpat 200 MG tablet, , Disp: , Rfl:   •  zolpidem (AMBIEN) 5 MG tablet, Take 1 tablet by mouth At Night As Needed for Sleep., Disp: 20 tablet, Rfl: 0  •  promethazine (PHENERGAN) 25 MG tablet, Take 1 tablet by mouth Every 6 (Six) Hours As Needed for Nausea or Vomiting., Disp: 24 tablet, Rfl: 0   Allergies   Allergen Reactions   • Amoxicillin Rash     Patient reported no issue with amoxcillin-clavulanate 875 mg-125 mg twice daily for 10 days completed in 6/2021  Patient reported no issue with amoxcillin-clavulanate 875 mg-125 mg twice daily for 10 days completed in 6/2021        Past Surgical History:   Procedure Laterality Date   • CHOLECYSTECTOMY     • DILATATION AND CURETTAGE     • EAR TUBES     • ENDOSCOPY     • EXPLORATORY LAPAROTOMY     • GASTRIC BYPASS  2017   • GASTRIC BYPASS     • TONSILLECTOMY     • TOTAL LAPAROSCOPIC HYSTERECTOMY Right 5/3/2022    Procedure: TOTAL LAPAROSCOPIC HYSTERECTOMY,  BILATERTAL SALPINGO-OOPHORECTOMY, CYSTOCOPY;  Surgeon: Taniya Whiteside MD;  Location: [facility];  Service: Obstetrics/Gynecology;  Laterality: Right;      Social History     Socioeconomic History   • Marital status:    Tobacco Use   • Smoking status: Never Smoker   • Smokeless tobacco: Never Used   Vaping Use   • Vaping Use: Never used   Substance and Sexual Activity   • Alcohol use: No   • Drug use: No   • Sexual activity: Defer     Partners: Male     Birth control/protection: Surgical      Family History   Problem Relation Age of Onset   •  "Heart failure Father    • Bleeding Disorder Father    • Heart disease Sister    • Clotting disorder Sister        Review of Systems   Constitutional: Negative for chills, diaphoresis and fever.   Gastrointestinal: Positive for nausea and vomiting. Negative for constipation and diarrhea.   Genitourinary: Negative for difficulty urinating, dysuria, vaginal bleeding and vaginal discharge.           Objective   /88   Ht 154.9 cm (61\")   Wt 92.2 kg (203 lb 3.2 oz)   LMP 04/07/2022 (Exact Date)   Breastfeeding No   BMI 38.39 kg/m²     Physical Exam  Constitutional:       Appearance: Normal appearance. She is well-developed and well-groomed.   Eyes:      General: Lids are normal.      Extraocular Movements: Extraocular movements intact.      Conjunctiva/sclera: Conjunctivae normal.   Abdominal:      General: Bowel sounds are normal. There is no distension.      Palpations: Abdomen is soft.      Tenderness: There is no abdominal tenderness.      Comments: Incisions healing well   Skin:     General: Skin is warm and dry.      Findings: No bruising or lesion.   Neurological:      Mental Status: She is alert.   Psychiatric:         Attention and Perception: Attention normal.         Mood and Affect: Mood normal.         Speech: Speech normal.         Behavior: Behavior is cooperative.            Result Review :                   Assessment and Plan  Diagnoses and all orders for this visit:    1. Postoperative follow-up (Primary)    2. Postoperative nausea and vomiting    Other orders  -     promethazine (PHENERGAN) 25 MG tablet; Take 1 tablet by mouth Every 6 (Six) Hours As Needed for Nausea or Vomiting.  Dispense: 24 tablet; Refill: 0      Patient Instructions   Continue bland foods and adequate hydration  Continue light activity  Avoid driving for one more week  If any worsening of symptoms or develops bowel changes, fever chills or any other concerns call or RTO                This note was electronically " signed.    Meredith Malloy PA-C   May 10, 2022

## 2022-05-10 NOTE — PATIENT INSTRUCTIONS
Continue bland foods and adequate hydration  Continue light activity  Avoid driving for one more week  If any worsening of symptoms or develops bowel changes, fever chills or any other concerns call or RTO

## 2022-06-03 ENCOUNTER — OFFICE VISIT (OUTPATIENT)
Dept: CARDIOLOGY | Facility: CLINIC | Age: 42
End: 2022-06-03

## 2022-06-03 VITALS
OXYGEN SATURATION: 99 % | BODY MASS INDEX: 39.46 KG/M2 | WEIGHT: 209 LBS | HEART RATE: 90 BPM | DIASTOLIC BLOOD PRESSURE: 72 MMHG | SYSTOLIC BLOOD PRESSURE: 128 MMHG | HEIGHT: 61 IN

## 2022-06-03 DIAGNOSIS — R55 SYNCOPE AND COLLAPSE: Primary | ICD-10-CM

## 2022-06-03 DIAGNOSIS — I20.8 ANGINAL EQUIVALENT: ICD-10-CM

## 2022-06-03 DIAGNOSIS — R40.20 LOC (LOSS OF CONSCIOUSNESS): ICD-10-CM

## 2022-06-03 PROCEDURE — 99214 OFFICE O/P EST MOD 30 MIN: CPT | Performed by: INTERNAL MEDICINE

## 2022-06-03 NOTE — PROGRESS NOTES
[facility]  [address], [address]  [place], [place], [address]    [phone]  [email or web address]           OUTPATIENT CLINIC FOLLOW-UP NOTE    Patient care team:  Patient Care Team:  Christianne Elizalde APRN as PCP - General (Family Medicine)      Subjective:   Chief complaint:   Chief Complaint   Patient presents with   • Hypertension       HPI:    Luz Maria Andrade is a 41 y.o. female.  Partial problem list, including cardiac problems:  1. Bradycardia  2. Syncope  a. 4-5 syncopal episodes in the past year  b. Longstanding history of syncopal episodes  3. Hypertension  a. Clonidine as needed  4. Iron deficiency anemia  5. Epilepsy  a. Diagnosed in childhood  b. Typically 2-4 seizures per year  c. Followed by [facility]    Patient presents today for follow-up.    Since her last visit she has had 3 episodes of syncope.  First episode was while walking her dog.  Second episode was when she is getting up and down cleaning toys in her home.  Third episode was when shopping in [employer].  First 2 episodes were a few months ago, the last one in [employer], was 3 weeks ago.    No clear precipitating symptoms.  Is associated with post syncopal diaphoresis.  Also with post syncopal fatigue.  Tired for the whole day.  Did not get evaluated in a medical setting.  Does not know her blood pressure heart rate before, during, or after these events.    Day-to-day activities are not affected by symptoms of chest pain, dyspnea.  Does not exercise regularly    Has chronic lower extremity discomfort from the hip downward.    Also has had some intermittent seizures.  Followed by neurology.  Next follow-up with them in a couple weeks.    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   • Intramural leiomyoma of uterus         Current Outpatient Medications:   •  acetaminophen (TYLENOL) 500 MG tablet, Take 2 tablets by mouth Every 8 (Eight) Hours., Disp: 30 tablet, Rfl: 0  •  amitriptyline (ELAVIL) 10 MG tablet, Take 10 mg by mouth 3 (Three) Times a Day., Disp: , Rfl:   •  Cyanocobalamin (VITAMIN B 12) 100 MCG lozenge, Take  by mouth., Disp: , Rfl:   •  docusate sodium (COLACE) 100 MG capsule, Take 1 capsule by mouth 2 (Two) Times a Day As Needed for Constipation., Disp: 60 capsule, Rfl: 0  •  Emgality 120 MG/ML auto-injector pen, Inject 120 mg under the skin into the appropriate area as directed Every 30 (Thirty) Days., Disp: , Rfl:   •  pramipexole (MIRAPEX) 0.5 MG tablet, Take 0.5 mg by mouth Daily., Disp: , Rfl:   •  Vimpat 200 MG tablet, Take 200 mg by mouth Every 12 (Twelve) Hours., Disp: , Rfl:   •  zolpidem (AMBIEN) 5 MG tablet, Take 1 tablet by mouth At Night As Needed for Sleep., Disp: 20 tablet, Rfl: 0    Allergies   Allergen Reactions   • Amoxicillin Rash     Patient reported no issue with amoxcillin-clavulanate 875 mg-125 mg twice daily for 10 days completed in 6/2021  Patient reported no issue with amoxcillin-clavulanate 875 mg-125 mg twice daily for 10 days completed in 6/2021         Social History     Socioeconomic History   • Marital status:    Tobacco Use   • Smoking status: Never Smoker   • Smokeless tobacco: Never Used   Vaping Use   • Vaping Use: Never used   Substance and Sexual Activity   • Alcohol use: No   • Drug use: No   • Sexual activity: Defer     Partners: Male     Birth control/protection: Surgical     Family History   Problem Relation Age of Onset   • Heart failure Father    • Bleeding Disorder Father    • Heart disease Sister    • Clotting  "disorder Sister          Objective:   Physical Exam:  /72 (BP Location: Left arm, Patient Position: Sitting)   Pulse 90   Ht 154.9 cm (61\")   Wt 94.8 kg (209 lb)   LMP 04/07/2022 (Exact Date)   SpO2 99%   BMI 39.49 kg/m²     CONSTITUTIONAL: No acute distress  RESPIRATORY: Normal effort. Clear to auscultation bilaterally without wheezing or rales  CARDIOVASCULAR: Regular rate and rhythm with normal S1 and S2.  Prior noted faint HYACINTH at RUSB not heard today.    PERIPHERAL VASCULAR: Normal radial pulse. There is lower extremity edema bilaterally.      12/2021 exam: 2+ DP pulses bilaterally        Labs:    WBC   Date Value Ref Range Status   05/06/2022 5.60 3.40 - 10.80 10*3/mm3 Final   01/10/2022 4.34 3.40 - 10.80 10*3/mm3 Final   07/06/2021 4.01 3.70 - 10.30 10*3/uL Final     Hemoglobin   Date Value Ref Range Status   05/06/2022 12.3 12.0 - 15.9 g/dL Final   07/06/2021 13.3 11.2 - 15.7 g/dL Final     Hematocrit   Date Value Ref Range Status   05/06/2022 37.1 34.0 - 46.6 % Final   07/06/2021 41.4 34.0 - 45.0 % Final     Platelets   Date Value Ref Range Status   05/06/2022 293 140 - 450 10*3/mm3 Final   07/06/2021 212 155 - 369 10*3/uL Final       No results found for: CHOL  Lab Results   Component Value Date    TRIG 70 07/04/2021    TRIG 63 04/20/2020    TRIG 77 05/31/2019     Lab Results   Component Value Date    HDL 58 07/04/2021    HDL 55 04/20/2020    HDL 52 05/31/2019     Lab Results   Component Value Date     (H) 07/04/2021     No components found for: LDLDIRECTC    OSH labs 7/6/2021: WBC 4.01, hemoglobin 13.3, hematocrit 41.4, platelets 212, BUN 10, creatinine 0.78, GFR > 60, TSH 1.74, hemoglobin A1c 5.2, cholesterol 215, HDL 58, triglycerides 70, , AST 15, ALT 10    Diagnostic Data:    Procedures    Results for orders placed during the hospital encounter of 12/10/21    Adult Transthoracic Echo Complete W/ Cont if Necessary Per Protocol    Interpretation Summary  · Left ventricular " ejection fraction appears to be 61 - 65%. Left ventricular systolic function is normal.  · The cardiac valves are structurally and functionally within normal limits.    4 Day Holter 6/2022  · 4 days of rhythm monitoring. No significant arrhythmia noted. Patient triggered events were associated with sinus rhythm.      Assessment and Plan:     Syncope and collapse   Lightheadedness  -Recurrent syncope.  Possible vasovagal syndrome.  Possibly related to her seizures, although the patient states that the symptoms feel different.  -Due to recurrent syncope of unclear etiology, will have the patient undergo an exercise nuclear stress test for 2 purposes.  1 to rule out ischemia/evidence of obstructive coronary disease.  Secondly to observe her heart rate response to stress on the treadmill with a modified Víctor protocol.  Rule out chronotropic incompetence  -Due to recurrent syncope of unclear etiology and in the setting of a negative 4-day heart monitor/echocardiogram in the past, recommend implantable loop recorder  -Heart monitor to assess for arrhythmia.  48-hour monitor due to insurance.  If negative will order 30-day heart monitor.  Even discussed the loop recorder implant with the patient today  -Echocardiogram to rule out structural heart abnormalities.  -Encouraged building cardiac fitness with regular forms of exercise    Seizure disorder  -Consider as potential etiology of syncopal episodes.  -Follows up with neurology later this month    -Patient does not drive.  Was advised that she must be seizure/syncope free for 3 months in order to drive in [place].    - Return in about 3 months (around 9/3/2022).      Venkata Arcos MD, MSc, FACC, [facility]  Interventional Cardiology  [facility]

## 2022-06-08 PROBLEM — R55 SYNCOPE AND COLLAPSE: Status: ACTIVE | Noted: 2022-06-08

## 2022-06-13 ENCOUNTER — PREP FOR SURGERY (OUTPATIENT)
Dept: OTHER | Facility: HOSPITAL | Age: 42
End: 2022-06-13

## 2022-06-13 DIAGNOSIS — R55 SYNCOPE AND COLLAPSE: Primary | ICD-10-CM

## 2022-06-13 RX ORDER — SODIUM CHLORIDE 0.9 % (FLUSH) 0.9 %
10 SYRINGE (ML) INJECTION AS NEEDED
Status: CANCELLED | OUTPATIENT
Start: 2022-06-13

## 2022-06-13 RX ORDER — SODIUM CHLORIDE 0.9 % (FLUSH) 0.9 %
10 SYRINGE (ML) INJECTION EVERY 12 HOURS SCHEDULED
Status: CANCELLED | OUTPATIENT
Start: 2022-06-13

## 2022-06-15 ENCOUNTER — HOSPITAL ENCOUNTER (OUTPATIENT)
Dept: CARDIOLOGY | Facility: HOSPITAL | Age: 42
Discharge: HOME | End: 2022-06-15
Admitting: INTERNAL MEDICINE

## 2022-06-15 ENCOUNTER — APPOINTMENT (OUTPATIENT)
Dept: MAMMOGRAPHY | Facility: HOSPITAL | Age: 42
End: 2022-06-15

## 2022-06-15 DIAGNOSIS — I20.8 ANGINAL EQUIVALENT: ICD-10-CM

## 2022-06-15 DIAGNOSIS — R55 SYNCOPE AND COLLAPSE: ICD-10-CM

## 2022-06-15 PROCEDURE — A9500 TC99M SESTAMIBI: HCPCS | Performed by: INTERNAL MEDICINE

## 2022-06-15 PROCEDURE — 0 TECHNETIUM SESTAMIBI: Performed by: INTERNAL MEDICINE

## 2022-06-15 PROCEDURE — 93017 CV STRESS TEST TRACING ONLY: CPT

## 2022-06-15 PROCEDURE — 25010000002 REGADENOSON 0.4 MG/5ML SOLUTION: Performed by: INTERNAL MEDICINE

## 2022-06-15 PROCEDURE — 78452 HT MUSCLE IMAGE SPECT MULT: CPT | Performed by: INTERNAL MEDICINE

## 2022-06-15 PROCEDURE — 78452 HT MUSCLE IMAGE SPECT MULT: CPT

## 2022-06-15 PROCEDURE — 93018 CV STRESS TEST I&R ONLY: CPT | Performed by: INTERNAL MEDICINE

## 2022-06-15 RX ADMIN — REGADENOSON 0.4 MG: 0.08 INJECTION, SOLUTION INTRAVENOUS at 10:17

## 2022-06-15 RX ADMIN — TECHNETIUM TC 99M SESTAMIBI 1 DOSE: 1 INJECTION INTRAVENOUS at 10:18

## 2022-06-15 RX ADMIN — TECHNETIUM TC 99M SESTAMIBI 1 DOSE: 1 INJECTION INTRAVENOUS at 08:30

## 2022-06-16 LAB
BH CV REST NUCLEAR ISOTOPE DOSE: 9.3 MCI
BH CV STRESS BP STAGE 2: NORMAL
BH CV STRESS BP STAGE 4: NORMAL
BH CV STRESS COMMENTS STAGE 1: NORMAL
BH CV STRESS DOSE REGADENOSON STAGE 1: 0.4
BH CV STRESS DURATION MIN STAGE 1: 1
BH CV STRESS DURATION MIN STAGE 2: 1
BH CV STRESS DURATION MIN STAGE 3: 1
BH CV STRESS DURATION MIN STAGE 4: 1
BH CV STRESS DURATION SEC STAGE 1: 10
BH CV STRESS DURATION SEC STAGE 2: 0
BH CV STRESS HR STAGE 1: 100
BH CV STRESS HR STAGE 2: 105
BH CV STRESS HR STAGE 3: 97
BH CV STRESS HR STAGE 4: 93
BH CV STRESS NUCLEAR ISOTOPE DOSE: 31.6 MCI
BH CV STRESS O2 STAGE 1: 98
BH CV STRESS O2 STAGE 2: 98
BH CV STRESS O2 STAGE 3: 98
BH CV STRESS O2 STAGE 4: 98
BH CV STRESS PROTOCOL 1: NORMAL
BH CV STRESS RECOVERY BP: NORMAL MMHG
BH CV STRESS RECOVERY HR: 93 BPM
BH CV STRESS RECOVERY O2: 97 %
BH CV STRESS STAGE 1: 1
BH CV STRESS STAGE 2: 2
BH CV STRESS STAGE 3: 3
BH CV STRESS STAGE 4: 4
LV EF NUC BP: 67 %
MAXIMAL PREDICTED HEART RATE: 180 BPM
PERCENT MAX PREDICTED HR: 62.22 %
STRESS BASELINE BP: NORMAL MMHG
STRESS BASELINE HR: 83 BPM
STRESS O2 SAT REST: 98 %
STRESS PERCENT HR: 73 %
STRESS POST ESTIMATED WORKLOAD: 1 METS
STRESS POST EXERCISE DUR MIN: 4 MIN
STRESS POST EXERCISE DUR SEC: 0 SEC
STRESS POST O2 SAT PEAK: 98 %
STRESS POST PEAK BP: NORMAL MMHG
STRESS POST PEAK HR: 112 BPM
STRESS TARGET HR: 153 BPM

## 2022-06-30 ENCOUNTER — HOSPITAL ENCOUNTER (OUTPATIENT)
Facility: HOSPITAL | Age: 42
Setting detail: HOSPITAL OUTPATIENT SURGERY
Discharge: HOME | End: 2022-06-30
Attending: INTERNAL MEDICINE | Admitting: INTERNAL MEDICINE

## 2022-06-30 VITALS
HEART RATE: 88 BPM | SYSTOLIC BLOOD PRESSURE: 141 MMHG | HEIGHT: 61 IN | DIASTOLIC BLOOD PRESSURE: 99 MMHG | OXYGEN SATURATION: 97 % | WEIGHT: 210 LBS | BODY MASS INDEX: 39.65 KG/M2 | RESPIRATION RATE: 16 BRPM | TEMPERATURE: 98 F

## 2022-06-30 DIAGNOSIS — R55 SYNCOPE AND COLLAPSE: ICD-10-CM

## 2022-06-30 DIAGNOSIS — R40.20 LOC (LOSS OF CONSCIOUSNESS): ICD-10-CM

## 2022-06-30 LAB
ANION GAP SERPL CALCULATED.3IONS-SCNC: 9 MMOL/L (ref 5–15)
BUN SERPL-MCNC: 12 MG/DL (ref 6–20)
BUN/CREAT SERPL: 16 (ref 7–25)
CALCIUM SPEC-SCNC: 9.5 MG/DL (ref 8.6–10.5)
CHLORIDE SERPL-SCNC: 103 MMOL/L (ref 98–107)
CO2 SERPL-SCNC: 27 MMOL/L (ref 22–29)
CREAT SERPL-MCNC: 0.75 MG/DL (ref 0.57–1)
DEPRECATED RDW RBC AUTO: 43.5 FL (ref 37–54)
EGFRCR SERPLBLD CKD-EPI 2021: 103.4 ML/MIN/1.73
ERYTHROCYTE [DISTWIDTH] IN BLOOD BY AUTOMATED COUNT: 14 % (ref 12.3–15.4)
GLUCOSE SERPL-MCNC: 99 MG/DL (ref 65–99)
HCT VFR BLD AUTO: 40.4 % (ref 34–46.6)
HGB BLD-MCNC: 13 G/DL (ref 12–15.9)
MCH RBC QN AUTO: 27.1 PG (ref 26.6–33)
MCHC RBC AUTO-ENTMCNC: 32.2 G/DL (ref 31.5–35.7)
MCV RBC AUTO: 84.3 FL (ref 79–97)
PLATELET # BLD AUTO: 273 10*3/MM3 (ref 140–450)
PMV BLD AUTO: 9.3 FL (ref 6–12)
POTASSIUM SERPL-SCNC: 3.9 MMOL/L (ref 3.5–5.2)
RBC # BLD AUTO: 4.79 10*6/MM3 (ref 3.77–5.28)
SODIUM SERPL-SCNC: 139 MMOL/L (ref 136–145)
WBC NRBC COR # BLD: 4.89 10*3/MM3 (ref 3.4–10.8)

## 2022-06-30 PROCEDURE — 85027 COMPLETE CBC AUTOMATED: CPT | Performed by: HOSPITALIST

## 2022-06-30 PROCEDURE — 33285 INSJ SUBQ CAR RHYTHM MNTR: CPT | Performed by: INTERNAL MEDICINE

## 2022-06-30 PROCEDURE — 80048 BASIC METABOLIC PNL TOTAL CA: CPT | Performed by: HOSPITALIST

## 2022-06-30 PROCEDURE — C1764 EVENT RECORDER, CARDIAC: HCPCS | Performed by: INTERNAL MEDICINE

## 2022-06-30 PROCEDURE — 25010000002 CEFAZOLIN IN DEXTROSE 2-4 GM/100ML-% SOLUTION: Performed by: HOSPITALIST

## 2022-06-30 PROCEDURE — 36415 COLL VENOUS BLD VENIPUNCTURE: CPT

## 2022-06-30 DEVICE — LUX-DX™ INSERTABLE CARDIAC MONITOR
Type: IMPLANTABLE DEVICE | Site: CHEST | Status: FUNCTIONAL
Brand: LUX-DX™ INSERTABLE CARDIAC MONITOR

## 2022-06-30 RX ORDER — LIDOCAINE HYDROCHLORIDE 10 MG/ML
INJECTION, SOLUTION EPIDURAL; INFILTRATION; INTRACAUDAL; PERINEURAL AS NEEDED
Status: DISCONTINUED | OUTPATIENT
Start: 2022-06-30 | End: 2022-06-30 | Stop reason: HOSPADM

## 2022-06-30 RX ORDER — SODIUM CHLORIDE 0.9 % (FLUSH) 0.9 %
10 SYRINGE (ML) INJECTION AS NEEDED
Status: DISCONTINUED | OUTPATIENT
Start: 2022-06-30 | End: 2022-06-30 | Stop reason: HOSPADM

## 2022-06-30 RX ORDER — TOPIRAMATE 200 MG/1
200 CAPSULE, EXTENDED RELEASE ORAL NIGHTLY
COMMUNITY

## 2022-06-30 RX ORDER — CEFAZOLIN SODIUM 2 G/100ML
2 INJECTION, SOLUTION INTRAVENOUS ONCE
Status: COMPLETED | OUTPATIENT
Start: 2022-06-30 | End: 2022-06-30

## 2022-06-30 RX ORDER — SODIUM CHLORIDE 0.9 % (FLUSH) 0.9 %
10 SYRINGE (ML) INJECTION EVERY 12 HOURS SCHEDULED
Status: DISCONTINUED | OUTPATIENT
Start: 2022-06-30 | End: 2022-06-30 | Stop reason: HOSPADM

## 2022-06-30 RX ORDER — ACETAMINOPHEN 325 MG/1
650 TABLET ORAL EVERY 4 HOURS PRN
Status: DISCONTINUED | OUTPATIENT
Start: 2022-06-30 | End: 2022-06-30 | Stop reason: HOSPADM

## 2022-06-30 RX ADMIN — CEFAZOLIN SODIUM 2 G: 2 INJECTION, SOLUTION INTRAVENOUS at 10:21

## 2022-07-07 ENCOUNTER — OFFICE VISIT (OUTPATIENT)
Dept: OBSTETRICS AND GYNECOLOGY | Facility: CLINIC | Age: 42
End: 2022-07-07

## 2022-07-07 VITALS
BODY MASS INDEX: 39.73 KG/M2 | SYSTOLIC BLOOD PRESSURE: 136 MMHG | DIASTOLIC BLOOD PRESSURE: 84 MMHG | WEIGHT: 210.4 LBS | HEIGHT: 61 IN

## 2022-07-07 DIAGNOSIS — Z09 POSTOPERATIVE FOLLOW-UP: Primary | ICD-10-CM

## 2022-07-07 DIAGNOSIS — N95.1 MENOPAUSAL SYMPTOMS: ICD-10-CM

## 2022-07-07 PROCEDURE — 99024 POSTOP FOLLOW-UP VISIT: CPT | Performed by: PHYSICIAN ASSISTANT

## 2022-07-07 RX ORDER — CITALOPRAM 40 MG/1
TABLET ORAL
COMMUNITY
Start: 2022-06-20

## 2022-07-07 RX ORDER — PROPRANOLOL HYDROCHLORIDE 40 MG/1
TABLET ORAL
COMMUNITY
Start: 2022-06-20

## 2022-07-07 RX ORDER — ESTRADIOL 0.1 MG/D
1 FILM, EXTENDED RELEASE TRANSDERMAL 2 TIMES WEEKLY
Qty: 8 PATCH | Refills: 12 | Status: SHIPPED | OUTPATIENT
Start: 2022-07-07

## 2022-07-07 RX ORDER — CELECOXIB 100 MG/1
CAPSULE ORAL
COMMUNITY
Start: 2022-06-22

## 2022-07-07 RX ORDER — RIZATRIPTAN BENZOATE 10 MG/1
TABLET ORAL
COMMUNITY
Start: 2022-06-17

## 2022-07-07 RX ORDER — TIZANIDINE 4 MG/1
TABLET ORAL
COMMUNITY
Start: 2022-06-23

## 2022-07-07 NOTE — PATIENT INSTRUCTIONS
Will start hormone patch Minivelle 0.1 mg change twice weekly  Encouraged to allow about 6 weeks to assess how her hot flashes and vaginal dryness is.  If she feels things are going well follow-up in 1 year or as needed.

## 2022-07-07 NOTE — PROGRESS NOTES
Subjective   Chief Complaint   Patient presents with   • Post-op     9 week post-op TLH-BSO, patient would like to discuss HRT       Luz Maria Andrade is a 42 y.o. year old  presenting to be seen for postop visit.  She is now 9 weeks postop total laparoscopic hysterectomy bilateral salpingo-oophorectomy.  She feels she has done well recovering from her surgery however she has been having some hot flashes and vaginal dryness for a few weeks and she is requesting to start hormone replacement therapy.  She has had intercourse in the last couple of weeks reports vaginal dryness    Past Medical History:   Diagnosis Date   • Anemia    • Anxiety    • Bleeding disorder (HCC)     Free bleeding but no hemophelia   • Clotting disorder (HCC)    • Ear piercing    • Epilepsy (HCC)     last seizure 3/2022   • GERD (gastroesophageal reflux disease)    • History of Holter monitoring 2022   • Hx of echocardiogram 12/10/2021   • Irregular heart beat    • Kidney stone    • Murmur    • Ovarian cyst    • Polycystic ovary syndrome    • PONV (postoperative nausea and vomiting)    • Seasonal allergies    • Spinal headache    • Syncope         Current Outpatient Medications:   •  amitriptyline (ELAVIL) 10 MG tablet, Take 10 mg by mouth 3 (Three) Times a Day., Disp: , Rfl:   •  celecoxib (CeleBREX) 100 MG capsule, , Disp: , Rfl:   •  citalopram (CeleXA) 40 MG tablet, , Disp: , Rfl:   •  Cyanocobalamin (VITAMIN B 12) 100 MCG lozenge, Take  by mouth., Disp: , Rfl:   •  Emgality 120 MG/ML auto-injector pen, Inject 120 mg under the skin into the appropriate area as directed Every 30 (Thirty) Days., Disp: , Rfl:   •  pramipexole (MIRAPEX) 0.5 MG tablet, Take 0.5 mg by mouth Daily., Disp: , Rfl:   •  propranolol (INDERAL) 40 MG tablet, , Disp: , Rfl:   •  rizatriptan (MAXALT) 10 MG tablet, , Disp: , Rfl:   •  tiZANidine (ZANAFLEX) 4 MG tablet, , Disp: , Rfl:   •  Topiramate ER (Trokendi XR) 200 MG capsule sustained-release 24 hr,  Take 200 mg by mouth Every Night., Disp: , Rfl:   •  Vimpat 200 MG tablet, Take 200 mg by mouth Every 12 (Twelve) Hours., Disp: , Rfl:   •  acetaminophen (TYLENOL) 500 MG tablet, Take 2 tablets by mouth Every 8 (Eight) Hours., Disp: 30 tablet, Rfl: 0  •  estradiol (Minivelle) 0.1 MG/24HR patch, Place 1 patch on the skin as directed by provider 2 (Two) Times a Week., Disp: 8 patch, Rfl: 12   Allergies   Allergen Reactions   • Amoxicillin Rash     Patient reported no issue with amoxcillin-clavulanate 875 mg-125 mg twice daily for 10 days completed in 6/2021  Patient reported no issue with amoxcillin-clavulanate 875 mg-125 mg twice daily for 10 days completed in 6/2021        Past Surgical History:   Procedure Laterality Date   • CARDIAC ELECTROPHYSIOLOGY PROCEDURE N/A 6/30/2022    Procedure: Loop insertion;  Surgeon: Venkata Arcos MD;  Location: [facility] INVASIVE LOC[facility];  Service: Cardiovascular;  Laterality: N/A;   • CHOLECYSTECTOMY     • DILATATION AND CURETTAGE     • EAR TUBES     • ENDOSCOPY     • EXPLORATORY LAPAROTOMY     • GASTRIC BYPASS  2017   • GASTRIC BYPASS     • TONSILLECTOMY     • TOTAL LAPAROSCOPIC HYSTERECTOMY Right 5/3/2022    Procedure: TOTAL LAPAROSCOPIC HYSTERECTOMY,  BILATERTAL SALPINGO-OOPHORECTOMY, CYSTOCOPY;  Surgeon: Taniya Whiteside MD;  Location: [facility];  Service: Obstetrics/Gynecology;  Laterality: Right;      Social History     Socioeconomic History   • Marital status:    Tobacco Use   • Smoking status: Never Smoker   • Smokeless tobacco: Never Used   Vaping Use   • Vaping Use: Never used   Substance and Sexual Activity   • Alcohol use: No   • Drug use: No   • Sexual activity: Defer     Partners: Male     Birth control/protection: Surgical      Family History   Problem Relation Age of Onset   • Heart failure Father    • Bleeding Disorder Father    • Heart disease Sister    • Clotting disorder Sister        Review of Systems   Constitutional: Negative for chills, diaphoresis  "and fever.   Gastrointestinal: Negative.    Genitourinary: Negative for difficulty urinating, dysuria, pelvic pain, vaginal bleeding and vaginal discharge.           Objective   /84   Ht 154.9 cm (61\")   Wt 95.4 kg (210 lb 6.4 oz)   LMP 04/07/2022 (Exact Date)   Breastfeeding No   BMI 39.75 kg/m²     Physical Exam  Constitutional:       Appearance: Normal appearance. She is well-developed and well-groomed.   Eyes:      General: Lids are normal.      Extraocular Movements: Extraocular movements intact.      Conjunctiva/sclera: Conjunctivae normal.   Genitourinary:     Labia:         Right: No rash, tenderness or lesion.         Left: No rash, tenderness or lesion.       Urethra: No prolapse, urethral pain, urethral swelling or urethral lesion.      Vagina: No vaginal discharge, tenderness or lesions.      Uterus: Absent.       Adnexa:         Right: No mass or tenderness.          Left: No mass or tenderness.        Comments: Vaginal cuff healed well. No granulation tissue seen  Skin:     General: Skin is warm and dry.      Findings: No bruising or lesion.   Neurological:      Mental Status: She is alert.   Psychiatric:         Attention and Perception: Attention normal.         Mood and Affect: Mood normal.         Speech: Speech normal.         Behavior: Behavior is cooperative.            Result Review :                   Assessment and Plan  Diagnoses and all orders for this visit:    1. Postoperative follow-up (Primary)    2. Menopausal symptoms    Other orders  -     estradiol (Minivelle) 0.1 MG/24HR patch; Place 1 patch on the skin as directed by provider 2 (Two) Times a Week.  Dispense: 8 patch; Refill: 12      Patient Instructions   Will start hormone patch Minivelle 0.1 mg change twice weekly  Encouraged to allow about 6 weeks to assess how her hot flashes and vaginal dryness is.  If she feels things are going well follow-up in 1 year or as needed.             This note was electronically " signed.    Meredith Malloy PA-C   July 7, 2022

## 2022-10-28 ENCOUNTER — TELEPHONE (OUTPATIENT)
Dept: CARDIOLOGY | Facility: CLINIC | Age: 42
End: 2022-10-28

## 2022-10-28 PROCEDURE — G2066 INTER DEVC REMOTE 30D: HCPCS | Performed by: INTERNAL MEDICINE

## 2022-10-28 PROCEDURE — 93298 REM INTERROG DEV EVAL SCRMS: CPT | Performed by: INTERNAL MEDICINE

## 2022-10-28 NOTE — TELEPHONE ENCOUNTER
Called Mrs Andrade due to [employer] loop recorder monitor isn't reading.  Spoke to her and she found it unplugged.  She will plug up and power it on.

## 2022-11-28 PROCEDURE — 93298 REM INTERROG DEV EVAL SCRMS: CPT | Performed by: INTERNAL MEDICINE

## 2022-11-28 PROCEDURE — G2066 INTER DEVC REMOTE 30D: HCPCS | Performed by: INTERNAL MEDICINE

## 2022-12-29 PROCEDURE — G2066 INTER DEVC REMOTE 30D: HCPCS | Performed by: INTERNAL MEDICINE

## 2022-12-29 PROCEDURE — 93298 REM INTERROG DEV EVAL SCRMS: CPT | Performed by: INTERNAL MEDICINE

## 2023-01-26 ENCOUNTER — TELEPHONE (OUTPATIENT)
Dept: CARDIOLOGY | Facility: CLINIC | Age: 43
End: 2023-01-26
Payer: MEDICAID

## 2023-01-26 NOTE — TELEPHONE ENCOUNTER
Called Mrs Andrade due to [employer] loop recorder monitor isn't reading.  She will check all connections.  She reports daughter will take her  at times from monitor.

## 2023-03-01 PROCEDURE — 93298 REM INTERROG DEV EVAL SCRMS: CPT | Performed by: INTERNAL MEDICINE

## 2023-03-01 PROCEDURE — G2066 INTER DEVC REMOTE 30D: HCPCS | Performed by: INTERNAL MEDICINE

## 2023-04-01 PROCEDURE — G2066 INTER DEVC REMOTE 30D: HCPCS | Performed by: INTERNAL MEDICINE

## 2023-04-01 PROCEDURE — 93298 REM INTERROG DEV EVAL SCRMS: CPT | Performed by: INTERNAL MEDICINE

## 2023-06-03 ENCOUNTER — APPOINTMENT (OUTPATIENT)
Dept: GENERAL RADIOLOGY | Facility: HOSPITAL | Age: 43
End: 2023-06-03
Payer: MEDICAID

## 2023-06-03 ENCOUNTER — HOSPITAL ENCOUNTER (EMERGENCY)
Facility: HOSPITAL | Age: 43
Discharge: HOME | End: 2023-06-03
Attending: EMERGENCY MEDICINE
Payer: MEDICAID

## 2023-06-03 VITALS
HEART RATE: 85 BPM | HEIGHT: 61 IN | DIASTOLIC BLOOD PRESSURE: 83 MMHG | WEIGHT: 220 LBS | TEMPERATURE: 97.9 F | BODY MASS INDEX: 41.54 KG/M2 | OXYGEN SATURATION: 99 % | SYSTOLIC BLOOD PRESSURE: 135 MMHG | RESPIRATION RATE: 16 BRPM

## 2023-06-03 DIAGNOSIS — G40.909 SEIZURE DISORDER: ICD-10-CM

## 2023-06-03 DIAGNOSIS — R53.1 GENERALIZED WEAKNESS: Primary | ICD-10-CM

## 2023-06-03 LAB
ALBUMIN SERPL-MCNC: 4.1 G/DL (ref 3.5–5.2)
ALBUMIN/GLOB SERPL: 1.1 G/DL
ALP SERPL-CCNC: 113 U/L (ref 39–117)
ALT SERPL W P-5'-P-CCNC: 25 U/L (ref 1–33)
ANION GAP SERPL CALCULATED.3IONS-SCNC: 11 MMOL/L (ref 5–15)
AST SERPL-CCNC: 40 U/L (ref 1–32)
BASOPHILS # BLD AUTO: 0.03 10*3/MM3 (ref 0–0.2)
BASOPHILS NFR BLD AUTO: 0.6 % (ref 0–1.5)
BILIRUB SERPL-MCNC: 0.4 MG/DL (ref 0–1.2)
BILIRUB UR QL STRIP: NEGATIVE
BUN SERPL-MCNC: 6 MG/DL (ref 6–20)
BUN/CREAT SERPL: 8.3 (ref 7–25)
CALCIUM SPEC-SCNC: 9.6 MG/DL (ref 8.6–10.5)
CHLORIDE SERPL-SCNC: 108 MMOL/L (ref 98–107)
CLARITY UR: CLEAR
CO2 SERPL-SCNC: 22 MMOL/L (ref 22–29)
COLOR UR: YELLOW
CREAT SERPL-MCNC: 0.72 MG/DL (ref 0.57–1)
DEPRECATED RDW RBC AUTO: 51 FL (ref 37–54)
EGFRCR SERPLBLD CKD-EPI 2021: 107.9 ML/MIN/1.73
EOSINOPHIL # BLD AUTO: 0.13 10*3/MM3 (ref 0–0.4)
EOSINOPHIL NFR BLD AUTO: 2.5 % (ref 0.3–6.2)
ERYTHROCYTE [DISTWIDTH] IN BLOOD BY AUTOMATED COUNT: 16.7 % (ref 12.3–15.4)
GLOBULIN UR ELPH-MCNC: 3.7 GM/DL
GLUCOSE SERPL-MCNC: 84 MG/DL (ref 65–99)
GLUCOSE UR STRIP-MCNC: NEGATIVE MG/DL
HCT VFR BLD AUTO: 43.2 % (ref 34–46.6)
HGB BLD-MCNC: 12.6 G/DL (ref 12–15.9)
HGB UR QL STRIP.AUTO: NEGATIVE
HOLD SPECIMEN: NORMAL
HOLD SPECIMEN: NORMAL
IMM GRANULOCYTES # BLD AUTO: 0.01 10*3/MM3 (ref 0–0.05)
IMM GRANULOCYTES NFR BLD AUTO: 0.2 % (ref 0–0.5)
KETONES UR QL STRIP: NEGATIVE
LEUKOCYTE ESTERASE UR QL STRIP.AUTO: NEGATIVE
LYMPHOCYTES # BLD AUTO: 1.59 10*3/MM3 (ref 0.7–3.1)
LYMPHOCYTES NFR BLD AUTO: 30.3 % (ref 19.6–45.3)
MCH RBC QN AUTO: 24.3 PG (ref 26.6–33)
MCHC RBC AUTO-ENTMCNC: 29.2 G/DL (ref 31.5–35.7)
MCV RBC AUTO: 83.4 FL (ref 79–97)
MONOCYTES # BLD AUTO: 0.18 10*3/MM3 (ref 0.1–0.9)
MONOCYTES NFR BLD AUTO: 3.4 % (ref 5–12)
NEUTROPHILS NFR BLD AUTO: 3.31 10*3/MM3 (ref 1.7–7)
NEUTROPHILS NFR BLD AUTO: 63 % (ref 42.7–76)
NITRITE UR QL STRIP: NEGATIVE
NRBC BLD AUTO-RTO: 0 /100 WBC (ref 0–0.2)
PH UR STRIP.AUTO: 7 [PH] (ref 5–8)
PLATELET # BLD AUTO: 321 10*3/MM3 (ref 140–450)
PMV BLD AUTO: 10.4 FL (ref 6–12)
POTASSIUM SERPL-SCNC: 4.8 MMOL/L (ref 3.5–5.2)
PROT SERPL-MCNC: 7.8 G/DL (ref 6–8.5)
PROT UR QL STRIP: NEGATIVE
RBC # BLD AUTO: 5.18 10*6/MM3 (ref 3.77–5.28)
RBC MORPH BLD: NORMAL
SMALL PLATELETS BLD QL SMEAR: ADEQUATE
SODIUM SERPL-SCNC: 141 MMOL/L (ref 136–145)
SP GR UR STRIP: 1.01 (ref 1–1.03)
UROBILINOGEN UR QL STRIP: NORMAL
WBC MORPH BLD: NORMAL
WBC NRBC COR # BLD: 5.25 10*3/MM3 (ref 3.4–10.8)
WHOLE BLOOD HOLD SPECIMEN: NORMAL

## 2023-06-03 PROCEDURE — 99283 EMERGENCY DEPT VISIT LOW MDM: CPT

## 2023-06-03 PROCEDURE — 96374 THER/PROPH/DIAG INJ IV PUSH: CPT

## 2023-06-03 PROCEDURE — 85007 BL SMEAR W/DIFF WBC COUNT: CPT | Performed by: PHYSICIAN ASSISTANT

## 2023-06-03 PROCEDURE — 85025 COMPLETE CBC W/AUTO DIFF WBC: CPT | Performed by: PHYSICIAN ASSISTANT

## 2023-06-03 PROCEDURE — 25010000002 ONDANSETRON PER 1 MG: Performed by: PHYSICIAN ASSISTANT

## 2023-06-03 PROCEDURE — 36415 COLL VENOUS BLD VENIPUNCTURE: CPT

## 2023-06-03 PROCEDURE — 71045 X-RAY EXAM CHEST 1 VIEW: CPT

## 2023-06-03 PROCEDURE — 80053 COMPREHEN METABOLIC PANEL: CPT | Performed by: PHYSICIAN ASSISTANT

## 2023-06-03 PROCEDURE — 81003 URINALYSIS AUTO W/O SCOPE: CPT | Performed by: PHYSICIAN ASSISTANT

## 2023-06-03 RX ORDER — ONDANSETRON 2 MG/ML
4 INJECTION INTRAMUSCULAR; INTRAVENOUS ONCE
Status: COMPLETED | OUTPATIENT
Start: 2023-06-03 | End: 2023-06-03

## 2023-06-03 RX ORDER — SODIUM CHLORIDE 0.9 % (FLUSH) 0.9 %
10 SYRINGE (ML) INJECTION AS NEEDED
Status: DISCONTINUED | OUTPATIENT
Start: 2023-06-03 | End: 2023-06-03 | Stop reason: HOSPADM

## 2023-06-03 RX ADMIN — SODIUM CHLORIDE 1000 ML: 9 INJECTION, SOLUTION INTRAVENOUS at 15:20

## 2023-06-03 RX ADMIN — ONDANSETRON 4 MG: 2 INJECTION INTRAMUSCULAR; INTRAVENOUS at 15:22

## 2023-06-03 NOTE — DISCHARGE INSTRUCTIONS
Rest.  Plenty of fluids.  Continue your current medications.  Call Dr. Schultz on Monday for follow-up.  Call your PCP for follow-up as well.  Return if worse.

## 2023-06-03 NOTE — ED PROVIDER NOTES
"Subjective   History of Present Illness  Ms. Dotson is a 42 yr old female with a history of epilepsy, PCOS, syncope with loop recorder placement, who presents to the emergency department with complaints of nausea and vomiting off and on for the past week and having general malaise.  The patient states that she had a seizure yesterday and was seen at [facility] and discharged home.  She states that she was accused of \"faking a seizure\" and was not taken seriously.  She presents to our hospital with continued nausea.  She has no abdominal pain.  She has had no fever or cough.  No shortness of breath.  No chest pain.  No abdominal pain.  Normal urination.  No diarrhea.  She is on Vimpat for her seizures and has been compliant.  She states that she had been followed by Jayda Robbins with neurology but that she recently became \"out of network\" for her and she needs to find a new neurologist.  She is also followed by Dr. Arcos for prior episodes of syncope and has a loop recorder in place.  She states that he is also now out of network.  She has had prior hysterectomy.    Review of Systems   Constitutional:  Positive for fatigue. Negative for chills and fever.   HENT:  Negative for sore throat.    Respiratory:  Negative for cough and shortness of breath.    Cardiovascular:  Negative for chest pain and palpitations.   Gastrointestinal:  Positive for nausea and vomiting. Negative for abdominal pain, blood in stool, constipation and diarrhea.   Genitourinary:  Negative for dysuria.   Musculoskeletal:  Positive for neck pain (Mild \"soreness\" from her recent seizure yesterday). Negative for back pain and neck stiffness.   Skin:  Negative for pallor and rash.   Allergic/Immunologic: Negative for immunocompromised state.   Neurological:  Positive for seizures, weakness (Generalized) and light-headedness. Negative for headaches.   Hematological: Negative.    Psychiatric/Behavioral:  Negative for confusion.  "     Past Medical History:   Diagnosis Date    Anemia     Anxiety     Bleeding disorder     Free bleeding but no hemophelia    Clotting disorder     Ear piercing     Epilepsy     last seizure 3/2022    GERD (gastroesophageal reflux disease)     History of Holter monitoring 01/18/2022    Hx of echocardiogram 12/10/2021    Irregular heart beat     Kidney stone     Murmur     Ovarian cyst     Polycystic ovary syndrome     PONV (postoperative nausea and vomiting)     Seasonal allergies     Spinal headache     Syncope 2021       Allergies   Allergen Reactions    Amoxicillin Rash     Patient reported no issue with amoxcillin-clavulanate 875 mg-125 mg twice daily for 10 days completed in 6/2021  Patient reported no issue with amoxcillin-clavulanate 875 mg-125 mg twice daily for 10 days completed in 6/2021         Past Surgical History:   Procedure Laterality Date    CARDIAC ELECTROPHYSIOLOGY PROCEDURE N/A 6/30/2022    Procedure: Loop insertion;  Surgeon: Venkata Arcos MD;  Location: [facility] INVASIVE LOCATION;  Service: Cardiovascular;  Laterality: N/A;    CHOLECYSTECTOMY      DILATATION AND CURETTAGE      EAR TUBES      ENDOSCOPY      EXPLORATORY LAPAROTOMY      GASTRIC BYPASS  2017    GASTRIC BYPASS      TONSILLECTOMY      TOTAL LAPAROSCOPIC HYSTERECTOMY Right 5/3/2022    Procedure: TOTAL LAPAROSCOPIC HYSTERECTOMY,  BILATERTAL SALPINGO-OOPHORECTOMY, CYSTOCOPY;  Surgeon: Taniya Whiteside MD;  Location: [facility] OR;  Service: Obstetrics/Gynecology;  Laterality: Right;       Family History   Problem Relation Age of Onset    Heart failure Father     Bleeding Disorder Father     Heart disease Sister     Clotting disorder Sister        Social History     Socioeconomic History    Marital status:    Tobacco Use    Smoking status: Never    Smokeless tobacco: Never   Vaping Use    Vaping Use: Never used   Substance and Sexual Activity    Alcohol use: No    Drug use: No    Sexual activity: Defer     Partners: Male     Birth  "control/protection: Surgical           Objective   Physical Exam  Constitutional:       General: She is not in acute distress.     Appearance: Normal appearance.   HENT:      Head: Normocephalic and atraumatic.      Nose: Nose normal.      Mouth/Throat:      Mouth: Mucous membranes are moist.      Pharynx: Oropharynx is clear.   Eyes:      General: No scleral icterus.     Conjunctiva/sclera: Conjunctivae normal.      Pupils: Pupils are equal, round, and reactive to light.   Cardiovascular:      Rate and Rhythm: Normal rate and regular rhythm.      Pulses: Normal pulses.   Pulmonary:      Effort: Pulmonary effort is normal.      Breath sounds: Normal breath sounds.   Abdominal:      General: Bowel sounds are normal.      Tenderness: There is no abdominal tenderness. There is no guarding or rebound.   Musculoskeletal:      Cervical back: Normal range of motion and neck supple. No rigidity or tenderness.      Right lower leg: No edema.      Left lower leg: No edema.   Skin:     General: Skin is warm and dry.      Coloration: Skin is not jaundiced or pale.   Neurological:      General: No focal deficit present.      Mental Status: She is alert and oriented to person, place, and time.   Psychiatric:         Mood and Affect: Mood normal.       Procedures           ED Course      In summary, 42-year-old female with a history of seizures, presents to the emergency department with complaints of general malaise and episodes of nausea and vomiting off and on for the past week.  She states that she had a seizure yesterday.  She is on Vimpat and is compliant with its use.  She states that she was seen at [facility] emergency department in [place] yesterday and discharged home.  She states that the doctor there accused her of having a \"fake seizure\" and did not take her seriously.  She presents to our emergency department for further evaluation.    MDM: Differential includes seizure disorder, viral syndrome, electrolyte " abnormality, UTI, etc.    Labs and UA obtained.  Chest x-ray ordered.  Patient given IV fluids and a dose of Zofran.    Labs are bland.  White count is normal at 5.25.  No anemia.  Normal urinalysis.  No concerning chemistries.  Normal renal functions with a creatinine of 0.72.  AST is slightly elevated at 40.    Chest x-ray shows no active disease.    I reviewed records from [facility].  Her labs there were bland as well and she had a CT of the head and CT neck that were normal.     I spoke with the patient about her work-up.  She feels some better after IV fluids and Zofran.  I will plan to discharge her home to follow-up.  She needs referral to a new neurologist.  I will refer her to Dr. Schultz.  She has been advised to return to the emergency department if worse.                                     Medical Decision Making  Amount and/or Complexity of Data Reviewed  Labs: ordered.  Radiology: ordered.    Risk  Prescription drug management.        Final diagnoses:   Generalized weakness   Seizure disorder       ED Disposition  ED Disposition       ED Disposition   Discharge    Condition   Stable    Comment   --               Cassandra Schultz MD  [address]  [facility] [address] [phone]      Call on Monday for follow-up.    Christianne Elizalde APRN  [address] [place], [place] [id number] [phone]    Call       [facility] Emergency Department  [address] [address] [phone]    If symptoms worsen         Medication List      No changes were made to your prescriptions during this visit.            Cj Diop, PA  06/03/23 3898

## 2023-06-12 ENCOUNTER — TELEPHONE (OUTPATIENT)
Dept: CARDIOLOGY | Facility: CLINIC | Age: 43
End: 2023-06-12
Payer: MEDICAID

## 2023-06-12 NOTE — TELEPHONE ENCOUNTER
Called Mrs Andrade due to [employer] loop recorder monitor isn't reading. She will check all connections.

## 2023-08-03 PROCEDURE — 93298 REM INTERROG DEV EVAL SCRMS: CPT | Performed by: INTERNAL MEDICINE

## 2023-08-03 PROCEDURE — G2066 INTER DEVC REMOTE 30D: HCPCS | Performed by: INTERNAL MEDICINE

## 2023-08-22 ENCOUNTER — HOSPITAL ENCOUNTER (OUTPATIENT)
Facility: HOSPITAL | Age: 43
Discharge: HOME | End: 2023-08-22
Payer: MEDICAID

## 2023-08-22 ENCOUNTER — HOSPITAL ENCOUNTER (OUTPATIENT)
Dept: CT IMAGING | Facility: HOSPITAL | Age: 43
Discharge: HOME | End: 2023-08-22
Payer: MEDICAID

## 2023-08-22 ENCOUNTER — HOSPITAL ENCOUNTER (OUTPATIENT)
Dept: NURSING | Facility: HOSPITAL | Age: 43
Setting detail: INFUSION SERIES
Discharge: HOME | End: 2023-08-24
Payer: MEDICAID

## 2023-08-22 VITALS
HEART RATE: 60 BPM | SYSTOLIC BLOOD PRESSURE: 142 MMHG | TEMPERATURE: 97.9 F | OXYGEN SATURATION: 99 % | DIASTOLIC BLOOD PRESSURE: 106 MMHG | RESPIRATION RATE: 16 BRPM

## 2023-08-22 DIAGNOSIS — R11.10 VOMITING, UNSPECIFIED VOMITING TYPE, UNSPECIFIED WHETHER NAUSEA PRESENT: ICD-10-CM

## 2023-08-22 DIAGNOSIS — R55 SYNCOPE AND COLLAPSE: ICD-10-CM

## 2023-08-22 LAB
ALBUMIN SERPL-MCNC: 3.8 G/DL (ref 3.4–4.8)
ALBUMIN/GLOB SERPL: 1.2 {RATIO} (ref 0.8–2)
ALP SERPL-CCNC: 112 U/L (ref 25–100)
ALT SERPL-CCNC: 15 U/L (ref 4–36)
ANION GAP SERPL CALCULATED.3IONS-SCNC: 11 MMOL/L (ref 3–16)
AST SERPL-CCNC: 23 U/L (ref 8–33)
BILIRUB SERPL-MCNC: 0.3 MG/DL (ref 0.3–1.2)
BUN SERPL-MCNC: 6 MG/DL (ref 6–20)
CALCIUM SERPL-MCNC: 8.8 MG/DL (ref 8.5–10.5)
CHLORIDE SERPL-SCNC: 103 MMOL/L (ref 98–107)
CO2 SERPL-SCNC: 22 MMOL/L (ref 20–30)
CREAT SERPL-MCNC: 0.7 MG/DL (ref 0.4–1.2)
ERYTHROCYTE [DISTWIDTH] IN BLOOD BY AUTOMATED COUNT: 16.2 % (ref 11–16)
GFR SERPLBLD CREATININE-BSD FMLA CKD-EPI: >60 ML/MIN/{1.73_M2}
GLOBULIN SER CALC-MCNC: 3.1 G/DL
GLUCOSE SERPL-MCNC: 100 MG/DL (ref 74–106)
HCT VFR BLD AUTO: 36.2 % (ref 37–47)
HGB BLD-MCNC: 11.1 G/DL (ref 11.5–16.5)
MCH RBC QN AUTO: 24.6 PG (ref 27–32)
MCHC RBC AUTO-ENTMCNC: 30.7 G/DL (ref 31–35)
MCV RBC AUTO: 80.1 FL (ref 80–100)
PLATELET # BLD AUTO: 308 K/UL (ref 150–400)
PMV BLD AUTO: 9.2 FL (ref 6–10)
POTASSIUM SERPL-SCNC: 3.7 MMOL/L (ref 3.4–5.1)
PROT SERPL-MCNC: 6.9 G/DL (ref 6.4–8.3)
RBC # BLD AUTO: 4.52 M/UL (ref 3.8–5.8)
SODIUM SERPL-SCNC: 136 MMOL/L (ref 136–145)
WBC # BLD AUTO: 4.5 K/UL (ref 4–11)

## 2023-08-22 PROCEDURE — 6360000004 HC RX CONTRAST MEDICATION: Performed by: NURSE PRACTITIONER

## 2023-08-22 PROCEDURE — 2580000003 HC RX 258: Performed by: NURSE PRACTITIONER

## 2023-08-22 PROCEDURE — 36415 COLL VENOUS BLD VENIPUNCTURE: CPT

## 2023-08-22 PROCEDURE — 85027 COMPLETE CBC AUTOMATED: CPT

## 2023-08-22 PROCEDURE — 80053 COMPREHEN METABOLIC PANEL: CPT

## 2023-08-22 PROCEDURE — 74177 CT ABD & PELVIS W/CONTRAST: CPT

## 2023-08-22 RX ORDER — 0.9 % SODIUM CHLORIDE 0.9 %
2000 INTRAVENOUS SOLUTION INTRAVENOUS ONCE
Status: COMPLETED | OUTPATIENT
Start: 2023-08-22 | End: 2023-08-22

## 2023-08-22 RX ADMIN — SODIUM CHLORIDE 1000 ML: 9 INJECTION, SOLUTION INTRAVENOUS at 13:40

## 2023-08-22 RX ADMIN — SODIUM CHLORIDE 1000 ML: 9 INJECTION, SOLUTION INTRAVENOUS at 14:48

## 2023-08-22 RX ADMIN — IOPAMIDOL 100 ML: 755 INJECTION, SOLUTION INTRAVENOUS at 11:24

## 2023-08-22 NOTE — PROGRESS NOTES
Pt here for IV fluids. Labs and CT ABD/Pelvis complete awaiting results. B/P elevated but trending down. Will wait for results of test and notify MD. Pt resting quietly in bed.

## 2023-08-22 NOTE — PROGRESS NOTES
Patient requests to stop 2nd bag of fluids to go home due to her infant being sick with a fever, IV removed, patient tolerated well, BP still elevated, patient advised to follow up with PCP for elevated BP. Present to ED for S&S of stroke and HA. Patient alert and oriented, stable and ambulatory at time of discharge.

## 2023-09-20 ENCOUNTER — TELEPHONE (OUTPATIENT)
Dept: CARDIOLOGY | Facility: CLINIC | Age: 43
End: 2023-09-20
Payer: MEDICAID

## 2023-09-20 NOTE — TELEPHONE ENCOUNTER
Called due to [employer] loop recorder monitor isn't reading.  Spoke with Mrs Andrade and she will check all connections.

## 2024-01-04 ENCOUNTER — TELEPHONE (OUTPATIENT)
Dept: CARDIOLOGY | Facility: CLINIC | Age: 44
End: 2024-01-04
Payer: MEDICAID

## 2024-01-04 NOTE — TELEPHONE ENCOUNTER
Called due to loop recorder monitor disconnected.  Spoke with Mrs Andrade and she will go check connections.

## 2024-02-23 ENCOUNTER — TELEPHONE (OUTPATIENT)
Dept: CARDIOLOGY | Facility: CLINIC | Age: 44
End: 2024-02-23
Payer: MEDICAID

## 2024-02-23 NOTE — TELEPHONE ENCOUNTER
Called patient to let her know that her loop recorder monitor still isn't working. Patient told me she thinks the monitor is tore up.     I gave her the phone number to [employer] to call so she can order a new monitor.

## 2025-01-23 NOTE — ED NOTES
[facility]  [facility]  [address]  [facility] [id number] [phone]     Patient: Marisol Muhammad   YOB: 1961   Date: 2025   Visit Type:  Follow up    Body Part:  left foot    When did the symptoms begin/Date of Onset or date of surgery? Pt states this started going on around November. She saw Bayron on  and was given an injection that day and the pt states it helped immediately. She says its more achy right now.     If over 55, have you willams an Osteoporosis Screening in the last 2 years? No    History of Present Illness  Chief Complaint   Patient presents with    Foot Pain     Left foot       This is a 63 y.o. female  presents today complaining of left foot pain.  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  She did have an injection in the ankle on 1220 and that has really helped her.    Review of Systems  System  Neg/Pos  Details  Constitutional  Negative  Chills, Fatigue, Fever and Night Sweats  Respiratory  Negative  Chest Pain, Cough and Dyspnea  Cardio   Negative  Leg Swelling  GI   Negative  Abdominal Pain, Constipation, Nausea and Vomiting     Negative  Urinary Incontinence   Endocrine  Negative  Weight Gain and Weight Loss  MS   Negative  Except as noted in HPI and Chief Complaint    Past Medical History:   Diagnosis Date    Depression     ANJALI (generalized anxiety disorder)     Hypertension     OA (osteoarthritis)     PONV (postoperative nausea and vomiting)       Past Surgical History:   Procedure Laterality Date    ANKLE FUSION      BUNIONECTOMY Right     CERVICAL FUSION       SECTION      DILATION AND CURETTAGE OF UTERUS      EYE SURGERY Left     cataract    OK COLONOSCOPY FLX DX W/COLLJ SPEC WHEN PFRMD N/A 2017    Dr Auguste-arcadio, 10 yr recall    TUBAL LIGATION        Social History     Socioeconomic  Called [facility] to get pts records     [place] Duty  01/22/21 [id number]

## 2025-02-01 ENCOUNTER — APPOINTMENT (OUTPATIENT)
Dept: CT IMAGING | Facility: HOSPITAL | Age: 45
End: 2025-02-01
Payer: MEDICAID

## 2025-02-01 ENCOUNTER — HOSPITAL ENCOUNTER (EMERGENCY)
Facility: HOSPITAL | Age: 45
Discharge: HOME | End: 2025-02-01
Attending: HOSPITALIST
Payer: MEDICAID

## 2025-02-01 VITALS
DIASTOLIC BLOOD PRESSURE: 71 MMHG | BODY MASS INDEX: 39.08 KG/M2 | HEIGHT: 61 IN | OXYGEN SATURATION: 100 % | RESPIRATION RATE: 16 BRPM | TEMPERATURE: 98 F | HEART RATE: 57 BPM | SYSTOLIC BLOOD PRESSURE: 121 MMHG | WEIGHT: 207 LBS

## 2025-02-01 DIAGNOSIS — R20.2 PARESTHESIA: ICD-10-CM

## 2025-02-01 DIAGNOSIS — R53.1 GENERAL WEAKNESS: Primary | ICD-10-CM

## 2025-02-01 LAB
ALBUMIN SERPL-MCNC: 3.8 G/DL (ref 3.4–4.8)
ALBUMIN/GLOB SERPL: 1.2 {RATIO} (ref 0.8–2)
ALP SERPL-CCNC: 112 U/L (ref 25–100)
ALT SERPL-CCNC: 12 U/L (ref 4–36)
AMPHET UR QL SCN: ABNORMAL
ANION GAP SERPL CALCULATED.3IONS-SCNC: 11 MMOL/L (ref 3–16)
AST SERPL-CCNC: 16 U/L (ref 8–33)
BARBITURATES UR QL SCN: POSITIVE
BASOPHILS # BLD: 0 K/UL (ref 0–0.1)
BASOPHILS NFR BLD: 0.2 %
BENZODIAZ UR QL SCN: ABNORMAL
BILIRUB SERPL-MCNC: <0.2 MG/DL (ref 0.3–1.2)
BILIRUB UR QL STRIP.AUTO: NEGATIVE
BUN SERPL-MCNC: 7 MG/DL (ref 6–20)
BUPRENORPHINE QUAL, URINE: ABNORMAL
CALCIUM SERPL-MCNC: 9.4 MG/DL (ref 8.5–10.5)
CANNABINOIDS UR QL SCN: ABNORMAL
CHLORIDE SERPL-SCNC: 111 MMOL/L (ref 98–107)
CLARITY UR: CLEAR
CO2 SERPL-SCNC: 23 MMOL/L (ref 20–30)
COCAINE UR QL SCN: ABNORMAL
COLOR UR: YELLOW
CREAT SERPL-MCNC: 0.8 MG/DL (ref 0.4–1.2)
DRUG SCREEN COMMENT UR-IMP: ABNORMAL
EOSINOPHIL # BLD: 0 K/UL (ref 0–0.4)
EOSINOPHIL NFR BLD: 0.1 %
ERYTHROCYTE [DISTWIDTH] IN BLOOD BY AUTOMATED COUNT: 17 % (ref 11–16)
FENTANYL SCREEN, URINE: NORMAL
FLUAV AG NPH QL: NEGATIVE
FLUBV AG NPH QL: NEGATIVE
GFR SERPLBLD CREATININE-BSD FMLA CKD-EPI: >90 ML/MIN/{1.73_M2}
GLOBULIN SER CALC-MCNC: 3.2 G/DL
GLUCOSE BLD-MCNC: 86 MG/DL
GLUCOSE BLD-MCNC: 86 MG/DL (ref 74–106)
GLUCOSE SERPL-MCNC: 86 MG/DL (ref 74–106)
GLUCOSE UR STRIP.AUTO-MCNC: NEGATIVE MG/DL
HCT VFR BLD AUTO: 37.8 % (ref 37–47)
HGB BLD-MCNC: 11.6 G/DL (ref 11.5–16.5)
HGB UR QL STRIP.AUTO: NEGATIVE
IMM GRANULOCYTES # BLD: 0 K/UL
IMM GRANULOCYTES NFR BLD: 0.4 % (ref 0–5)
KETONES UR STRIP.AUTO-MCNC: NEGATIVE MG/DL
LEUKOCYTE ESTERASE UR QL STRIP.AUTO: NEGATIVE
LYMPHOCYTES # BLD: 1.4 K/UL (ref 1.5–4)
LYMPHOCYTES NFR BLD: 14.9 %
MCH RBC QN AUTO: 24 PG (ref 27–32)
MCHC RBC AUTO-ENTMCNC: 30.7 G/DL (ref 31–35)
MCV RBC AUTO: 78.1 FL (ref 80–100)
METHADONE UR QL SCN: ABNORMAL
METHAMPHET UR QL SCN: ABNORMAL
MONOCYTES # BLD: 0.5 K/UL (ref 0.2–0.8)
MONOCYTES NFR BLD: 5.5 %
NEUTROPHILS # BLD: 7.6 K/UL (ref 2–7.5)
NEUTS SEG NFR BLD: 78.9 %
NITRITE UR QL STRIP.AUTO: NEGATIVE
OPIATES UR QL SCN: POSITIVE
OXYCODONE UR QL SCN: ABNORMAL
PCP UR QL SCN: ABNORMAL
PERFORMED ON: NORMAL
PH UR STRIP.AUTO: 6 [PH] (ref 5–8)
PLATELET # BLD AUTO: 307 K/UL (ref 150–400)
PMV BLD AUTO: 9.6 FL (ref 6–10)
POTASSIUM SERPL-SCNC: 3.7 MMOL/L (ref 3.4–5.1)
PROT SERPL-MCNC: 7 G/DL (ref 6.4–8.3)
PROT UR STRIP.AUTO-MCNC: NEGATIVE MG/DL
RBC # BLD AUTO: 4.84 M/UL (ref 3.8–5.8)
SARS-COV-2 RDRP RESP QL NAA+PROBE: NOT DETECTED
SODIUM SERPL-SCNC: 145 MMOL/L (ref 136–145)
SP GR UR STRIP.AUTO: 1.02 (ref 1–1.03)
TRICYCLICS UR QL SCN: ABNORMAL
TROPONIN, HIGH SENSITIVITY: <6 NG/L (ref 0–14)
UA COMPLETE W REFLEX CULTURE PNL UR: NORMAL
UA DIPSTICK W REFLEX MICRO PNL UR: NORMAL
URN SPEC COLLECT METH UR: NORMAL
UROBILINOGEN UR STRIP-ACNC: 1 E.U./DL
WBC # BLD AUTO: 9.7 K/UL (ref 4–11)

## 2025-02-01 PROCEDURE — 84484 ASSAY OF TROPONIN QUANT: CPT

## 2025-02-01 PROCEDURE — 36415 COLL VENOUS BLD VENIPUNCTURE: CPT

## 2025-02-01 PROCEDURE — 6360000004 HC RX CONTRAST MEDICATION: Performed by: HOSPITALIST

## 2025-02-01 PROCEDURE — 70498 CT ANGIOGRAPHY NECK: CPT

## 2025-02-01 PROCEDURE — 80053 COMPREHEN METABOLIC PANEL: CPT

## 2025-02-01 PROCEDURE — 87635 SARS-COV-2 COVID-19 AMP PRB: CPT

## 2025-02-01 PROCEDURE — 70496 CT ANGIOGRAPHY HEAD: CPT

## 2025-02-01 PROCEDURE — 99285 EMERGENCY DEPT VISIT HI MDM: CPT

## 2025-02-01 PROCEDURE — 85025 COMPLETE CBC W/AUTO DIFF WBC: CPT

## 2025-02-01 PROCEDURE — 80307 DRUG TEST PRSMV CHEM ANLYZR: CPT

## 2025-02-01 PROCEDURE — G0480 DRUG TEST DEF 1-7 CLASSES: HCPCS

## 2025-02-01 PROCEDURE — 87804 INFLUENZA ASSAY W/OPTIC: CPT

## 2025-02-01 PROCEDURE — 70450 CT HEAD/BRAIN W/O DYE: CPT

## 2025-02-01 PROCEDURE — 81003 URINALYSIS AUTO W/O SCOPE: CPT

## 2025-02-01 PROCEDURE — 2580000003 HC RX 258: Performed by: HOSPITALIST

## 2025-02-01 RX ORDER — LAMOTRIGINE 200 MG/1
400 TABLET ORAL DAILY
COMMUNITY

## 2025-02-01 RX ORDER — 0.9 % SODIUM CHLORIDE 0.9 %
1000 INTRAVENOUS SOLUTION INTRAVENOUS ONCE
Status: COMPLETED | OUTPATIENT
Start: 2025-02-01 | End: 2025-02-01

## 2025-02-01 RX ORDER — IOPAMIDOL 755 MG/ML
100 INJECTION, SOLUTION INTRAVASCULAR
Status: COMPLETED | OUTPATIENT
Start: 2025-02-01 | End: 2025-02-01

## 2025-02-01 RX ADMIN — SODIUM CHLORIDE 1000 ML: 9 INJECTION, SOLUTION INTRAVENOUS at 14:42

## 2025-02-01 RX ADMIN — IOPAMIDOL 100 ML: 755 INJECTION, SOLUTION INTRAVENOUS at 14:30

## 2025-02-01 ASSESSMENT — LIFESTYLE VARIABLES: HOW OFTEN DO YOU HAVE A DRINK CONTAINING ALCOHOL: NEVER

## 2025-02-01 ASSESSMENT — PAIN SCALES - GENERAL
PAINLEVEL_OUTOF10: 5
PAINLEVEL_OUTOF10: 8

## 2025-02-01 ASSESSMENT — PAIN DESCRIPTION - PAIN TYPE
TYPE: ACUTE PAIN
TYPE: ACUTE PAIN

## 2025-02-01 ASSESSMENT — PAIN DESCRIPTION - LOCATION
LOCATION: HEAD;NECK
LOCATION: HEAD;NECK

## 2025-02-01 ASSESSMENT — PAIN DESCRIPTION - DESCRIPTORS
DESCRIPTORS: ACHING
DESCRIPTORS: ACHING

## 2025-02-01 ASSESSMENT — PAIN - FUNCTIONAL ASSESSMENT: PAIN_FUNCTIONAL_ASSESSMENT: 0-10

## 2025-02-01 NOTE — ED TRIAGE NOTES
Pt states that she \"passed out\" twice yesterday and this am when she woke she had numbness in her right foot and her right hand and her mouth.  She ambulated to exam room 4 with a steady gait no limp noted.    Unable to obtain a NIH due to lack of cooperation of pt.  She did not attempt to squeeze my hand or lift leg.  Unwilling to smile fully for assessment.  Dr theron hwang.

## 2025-02-01 NOTE — ED NOTES
NIH completed with a score of 4.  I question the validity of the test as pt is otherwise alert and oriented.  When I advised pt that her age is 44 she stated \"maybe\".  She is speaking clearly and ambulating about the department with steady gait, no limp or dragging of right leg.  She appears in no acute distress.  Dr Garcia aware.

## 2025-02-01 NOTE — ED NOTES
Reviewed discharge plan with Silvina Nino.  Encouraged her to f/u with Kaitlin Guzman APRN and she understood.  NAD noted on discharge, gait steady.            Electronically signed by Cheli Arana RN on 2/1/2025 at 3:43 PM

## 2025-02-01 NOTE — ED PROVIDER NOTES
[facility]  EMERGENCY DEPARTMENT ENCOUNTER        Pt Name: Silvina Nino  MRN: [id number]  Birthdate 1980  Date of evaluation: 2/1/2025  Provider: Delonte Garcia DO  PCP: Kaitlin Guzman APRN  Note Started: 1:55 PM EST 2/1/25    CHIEF COMPLAINT       Chief Complaint   Patient presents with    Loss of Consciousness       HISTORY OF PRESENT ILLNESS: 1 or more Elements     History from : Patient    Limitations to history : None    Silvina Nino is a 44 y.o. female who presents to the emergency department for loss of consciousness which was actually yesterday.  Patient had syncopal episode she was actually seen at [place] emergency department and was diagnosed with vertigo and was discharged.  Apparently the patient left there and went straight to [facility] for evaluation.  She states while she was there though that she was \"blown off\".  She was given to cardiology because she has history of supraventricular tachycardia advised that could cause her to have a syncopal type episode.  Patient states that she just was not happy with that visit.  She does have a loop recorder in that she states she follows with someone in [place] for.  Patient presents today because she states that she awoke and she had some tingling or weakness to her right hand.  She states that within the last hour she started to have some tingling and weakness to the right side of the face and also to her right leg.  She states that she does have a headache.  Patient states that she is dominant right hand.  Patient denies any other numbness ting weakness.  Denies any fall trauma or injury.  She states her neck feels a little stiff with all this weakness that she is having.  She advises that she did not have any chest pain or shortness of breath.  She had some nausea but no vomiting or diarrhea.  No abdominal pain out of the ordinary.  No dysuria.  No body aches out of  Screen, Urine Neg Negative <300 ng/mL    Amphetamine Screen, Ur Neg Negative <1000 ng/mL    Cannabinoid Scrn, Ur Neg Negative <50 ng/mL    Opiate Screen, Urine POSITIVE (A) Negative <300 ng/mL    Barbiturate Screen, Ur POSITIVE (A) Negative <200 ng/mL    Tricyclic Antidepressants, Urine Neg Negative <300 ng/mL    Methadone Screen, Urine Neg Negative <300 ng/ml    Methamphetamine, Urine Neg Negative <1000 ng/mL    UR Oxycodone Rapid Screen Neg Negative <100 ng/mL    Buprenorphine Qual, Urine Neg Negative <25 ng/mL    Drug Screen Comment: see below    Fentanyl, Urine   Result Value Ref Range    FENTANYL SCREEN, URINE Neg Negative <5 ng/mL   POCT Glucose   Result Value Ref Range    POC Glucose 86 74 - 106 mg/dl    Performed on [employer]               EKG: As interpreted by me as sinus rhythm.  Low voltage precordial leads.  Heart rate of 78 bpm, IA interval is 158 ms, QRS duration 76, QT is 391 and QTc is 445 ms.  No ST elevations concerning for acute myocardial infarction.  No ST depressions concerning for acute myocardial ischemia.    RADIOLOGY:   Non-plain film images such as CT, Ultrasound and MRI are read by the radiologist. Plain radiographic images are visualized and preliminarily interpreted by the ED Provider with the below findings:    None    Interpretation per the Radiologist below, if available at the time of this note:    CTA NECK W CONTRAST   Final Result      CTA Head:   Normal CTA of the head.      CTA Neck:   Normal CTA of the neck.      Electronically signed by Moe Billy MD      CTA HEAD W CONTRAST   Final Result      CTA Head:   Normal CTA of the head.      CTA Neck:   Normal CTA of the neck.      Electronically signed by Moe Billy MD      CT Head WO Contrast   Final Result      Normal.      Electronically signed by Moe Billy MD        XR Chest 1 View    Result Date: 1/31/2025  CLINICAL INDICATION: cp TECHNIQUE: XR CHEST 1 VIEW COMPARISON: None. FINDINGS: Cardiomediastinal

## 2025-02-16 ENCOUNTER — APPOINTMENT (OUTPATIENT)
Dept: GENERAL RADIOLOGY | Facility: HOSPITAL | Age: 45
End: 2025-02-16
Attending: EMERGENCY MEDICINE
Payer: MEDICAID

## 2025-02-16 ENCOUNTER — HOSPITAL ENCOUNTER (EMERGENCY)
Facility: HOSPITAL | Age: 45
Discharge: HOME | End: 2025-02-16
Attending: EMERGENCY MEDICINE
Payer: MEDICAID

## 2025-02-16 VITALS
HEART RATE: 60 BPM | HEIGHT: 60 IN | SYSTOLIC BLOOD PRESSURE: 113 MMHG | TEMPERATURE: 97.8 F | BODY MASS INDEX: 40.64 KG/M2 | RESPIRATION RATE: 18 BRPM | WEIGHT: 207 LBS | DIASTOLIC BLOOD PRESSURE: 94 MMHG | OXYGEN SATURATION: 98 %

## 2025-02-16 DIAGNOSIS — M79.605 LEFT LEG PAIN: ICD-10-CM

## 2025-02-16 DIAGNOSIS — M79.604 RIGHT LEG PAIN: Primary | ICD-10-CM

## 2025-02-16 PROCEDURE — 6360000002 HC RX W HCPCS: Performed by: EMERGENCY MEDICINE

## 2025-02-16 PROCEDURE — 99284 EMERGENCY DEPT VISIT MOD MDM: CPT

## 2025-02-16 PROCEDURE — 96372 THER/PROPH/DIAG INJ SC/IM: CPT

## 2025-02-16 PROCEDURE — 73590 X-RAY EXAM OF LOWER LEG: CPT

## 2025-02-16 RX ORDER — KETOROLAC TROMETHAMINE 15 MG/ML
15 INJECTION, SOLUTION INTRAMUSCULAR; INTRAVENOUS ONCE
Status: COMPLETED | OUTPATIENT
Start: 2025-02-16 | End: 2025-02-16

## 2025-02-16 RX ORDER — KETOROLAC TROMETHAMINE 10 MG/1
10 TABLET, FILM COATED ORAL EVERY 6 HOURS PRN
Qty: 20 TABLET | Refills: 0 | Status: SHIPPED | OUTPATIENT
Start: 2025-02-16 | End: 2025-02-22

## 2025-02-16 RX ADMIN — KETOROLAC TROMETHAMINE 15 MG: 15 INJECTION, SOLUTION INTRAMUSCULAR; INTRAVENOUS at 07:56

## 2025-02-16 ASSESSMENT — PAIN DESCRIPTION - DESCRIPTORS: DESCRIPTORS: ACHING

## 2025-02-16 ASSESSMENT — PAIN DESCRIPTION - FREQUENCY: FREQUENCY: CONTINUOUS

## 2025-02-16 ASSESSMENT — PAIN DESCRIPTION - LOCATION: LOCATION: ANKLE;LEG;KNEE

## 2025-02-16 ASSESSMENT — LIFESTYLE VARIABLES
HOW OFTEN DO YOU HAVE A DRINK CONTAINING ALCOHOL: NEVER
HOW MANY STANDARD DRINKS CONTAINING ALCOHOL DO YOU HAVE ON A TYPICAL DAY: PATIENT DOES NOT DRINK

## 2025-02-16 ASSESSMENT — PAIN SCALES - GENERAL: PAINLEVEL_OUTOF10: 8

## 2025-02-16 ASSESSMENT — PAIN DESCRIPTION - ORIENTATION: ORIENTATION: RIGHT;LEFT

## 2025-02-16 NOTE — ED TRIAGE NOTES
Patient presents today with bilateral lower leg and ankle pain for weeks. She reports that the pain has been intermittent, but yesterday it worsened and has been continuous. She reports that Friday her leg \"gave out\" causing her to fall in the floor. She was able to walk from the lobby to the exam room without difficulty. She has been rotating tylenol and motrin, used heat and ice, but nothing has helped. She is concerned because her dad was diagnosed with bone cancer at a young age. She denies injury and swelling.

## 2025-02-16 NOTE — ED PROVIDER NOTES
.        [facility]  EMERGENCY DEPARTMENT ENCOUNTER        Pt Name: Silvina Nino  MRN: [id number]  Birthdate 1980  Date of evaluation: 2025  Provider: Monica Otto MD  PCP: Kaitlin Guzman APRN  Note Started: 7:14 AM EST 25    CHIEF COMPLAINT       Chief Complaint   Patient presents with    Leg Pain     bilateral    Ankle Pain     Bilateral         HISTORY OF PRESENT ILLNESS: 1 or more Elements     History from : Patient    Limitations to history : None    Silvina Nino is a 44 y.o. female who presents complaining of dull pain rating from her knee to her ankles on both legs her dad had osteosarcoma and  in his 30s and so she is worried about this pain is greater in the right than the left nothing makes it worse or better she last took Tylenol about 2 hours for the pain she denies any trauma she says it has been going on off and on for the last 2 months she does have some numbness in her right toes but no other neurologic deficit.    Nursing Notes were all reviewed and agreed with or any disagreements were addressed in the HPI.    REVIEW OF SYSTEMS :      Review of Systems     systems reviewed and negative except as in HPI/MDM    SURGICAL HISTORY     Past Surgical History:   Procedure Laterality Date    CHOLECYSTECTOMY      GASTRIC BYPASS SURGERY      LAPAROSCOPY      PA DILATION & CURETTAGE DX&/THER NONOBSTETRIC  2012    D & C       CURRENTMEDICATIONS       Previous Medications    ALBUTEROL SULFATE HFA (VENTOLIN HFA) 108 (90 BASE) MCG/ACT INHALER    Inhale 2 puffs into the lungs 4 times daily as needed for Wheezing    AMITRIPTYLINE (ELAVIL) 10 MG TABLET        CHOLECALCIFEROL (VITAMIN D3) 50 MCG (2000 UT) TABS    Take 2,000 Units by mouth daily    CLONIDINE (CATAPRES) 0.1 MG TABLET    Take 1 tablet by mouth 2 times daily as needed for High Blood Pressure (>150/90)    CYANOCOBALAMIN (B-12 PO)    Take by mouth    FERROUS SULFATE (IRON PO)    Take

## 2025-02-22 ENCOUNTER — HOSPITAL ENCOUNTER (EMERGENCY)
Facility: HOSPITAL | Age: 45
Discharge: HOME | End: 2025-02-22
Attending: EMERGENCY MEDICINE
Payer: MEDICAID

## 2025-02-22 VITALS
RESPIRATION RATE: 16 BRPM | HEART RATE: 79 BPM | TEMPERATURE: 97.6 F | DIASTOLIC BLOOD PRESSURE: 98 MMHG | OXYGEN SATURATION: 100 % | WEIGHT: 207 LBS | SYSTOLIC BLOOD PRESSURE: 116 MMHG | HEIGHT: 61 IN | BODY MASS INDEX: 39.08 KG/M2

## 2025-02-22 DIAGNOSIS — M54.31 SCIATICA OF RIGHT SIDE: Primary | ICD-10-CM

## 2025-02-22 PROCEDURE — 99284 EMERGENCY DEPT VISIT MOD MDM: CPT

## 2025-02-22 PROCEDURE — 2500000003 HC RX 250 WO HCPCS: Performed by: EMERGENCY MEDICINE

## 2025-02-22 PROCEDURE — 6360000002 HC RX W HCPCS: Performed by: EMERGENCY MEDICINE

## 2025-02-22 PROCEDURE — 96372 THER/PROPH/DIAG INJ SC/IM: CPT

## 2025-02-22 RX ORDER — PREDNISONE 20 MG/1
20 TABLET ORAL DAILY
Qty: 5 TABLET | Refills: 0 | Status: SHIPPED | OUTPATIENT
Start: 2025-02-22 | End: 2025-02-27

## 2025-02-22 RX ORDER — KETOROLAC TROMETHAMINE 10 MG/1
10 TABLET, FILM COATED ORAL EVERY 6 HOURS PRN
Qty: 20 TABLET | Refills: 0 | Status: SHIPPED | OUTPATIENT
Start: 2025-02-22 | End: 2025-02-22 | Stop reason: SINTOL

## 2025-02-22 RX ORDER — KETOROLAC TROMETHAMINE 15 MG/ML
15 INJECTION, SOLUTION INTRAMUSCULAR; INTRAVENOUS ONCE
Status: COMPLETED | OUTPATIENT
Start: 2025-02-22 | End: 2025-02-22

## 2025-02-22 RX ORDER — ONDANSETRON 2 MG/ML
4 INJECTION INTRAMUSCULAR; INTRAVENOUS ONCE
Status: COMPLETED | OUTPATIENT
Start: 2025-02-22 | End: 2025-02-22

## 2025-02-22 RX ORDER — TRAMADOL HYDROCHLORIDE 50 MG/1
50 TABLET ORAL EVERY 6 HOURS PRN
Qty: 12 TABLET | Refills: 0 | Status: SHIPPED | OUTPATIENT
Start: 2025-02-22 | End: 2025-02-25

## 2025-02-22 RX ADMIN — KETOROLAC TROMETHAMINE 15 MG: 15 INJECTION, SOLUTION INTRAMUSCULAR; INTRAVENOUS at 05:08

## 2025-02-22 RX ADMIN — ONDANSETRON 4 MG: 2 INJECTION INTRAMUSCULAR; INTRAVENOUS at 05:06

## 2025-02-22 RX ADMIN — METHYLPREDNISOLONE SODIUM SUCCINATE 125 MG: 125 INJECTION INTRAMUSCULAR; INTRAVENOUS at 05:05

## 2025-02-22 ASSESSMENT — ENCOUNTER SYMPTOMS
SHORTNESS OF BREATH: 0
PHOTOPHOBIA: 0
RHINORRHEA: 0
NAUSEA: 0
SORE THROAT: 0
ABDOMINAL PAIN: 0
DIARRHEA: 0
EYE REDNESS: 0
EYE PAIN: 0
VOMITING: 0
CHEST TIGHTNESS: 0
BACK PAIN: 1
WHEEZING: 0
SINUS PRESSURE: 0
STRIDOR: 0

## 2025-02-22 NOTE — ED PROVIDER NOTES
[facility] EMERGENCY DEPARTMENT  EMERGENCY DEPARTMENT ENCOUNTER        Pt Name: Silvina Nino  MRN: [id number]  Birthdate 1980  Date of evaluation: 2/22/2025  Provider: Alex Moreira MD  PCP: Kaitlin Guzman APRN  Note Started: 5:07 AM EST 2/22/25    CHIEF COMPLAINT       Chief Complaint   Patient presents with    Back Pain    Leg Pain       HISTORY OF PRESENT ILLNESS: 1 or more Elements     History from : Patient    Limitations to history : None    Silvina Nino is a 44 y.o. female who presents to the emergency room with low back pain radiating down to right lower extremity since yesterday.    Ms. Nino was seen in this facility emergency room on 2/1/2025 after she \"passed out twice\" and woke up with numbness in the right hand, right foot.  All workup was negative and she was discharged home.    On 2/16/2025 she returned to [facility] emergency room again with right leg pain, stating that she is \"concerned she may have bone cancer as she lost her dad in his 30s due to osteosarcoma\".  X-ray of the right tib-fib showed no bone tumor.      On 2/20/2025 she was seen at [place] ER with right leg pain, \"concerned for possible DVT, after stopping Coumadin a few months ago\".  An ultrasound of the right lower extremity showed no evidence of thromboembolism.      Tonight the patient denies any trauma, denies any injury to the right lower extremity or low back.  She also denies any focal neurological deficit in the lower extremities, denies any saddle anesthesia, denies any bladder or bowel incontinence.  No fever, no dysuria.  No recent travel, no recent exposure to sick contacts.    Nursing Notes were all reviewed and agreed with or any disagreements were addressed in the HPI.    REVIEW OF SYSTEMS :      Review of Systems   Constitutional:  Negative for appetite change, chills, diaphoresis, fatigue, fever and unexpected weight change.   HENT:  Negative for dental  with right leg pain, stating that she is \"concerned she may have bone cancer as she lost her dad in his 30s due to osteosarcoma\".  X-ray of the right tib-fib showed no bone tumor.      On 2/20/2025 she was seen at [facility] with right leg pain, \"concerned for possible DVT, after stopping Coumadin a few months ago\".  An ultrasound of the right lower extremity showed no evidence of thromboembolism.      Tonight the patient denies any trauma, denies any injury to the right lower extremity or low back.  She also denies any focal neurological deficit in the lower extremities, denies any saddle anesthesia, denies any bladder or bowel incontinence.  No fever, no dysuria.  No recent travel, no recent exposure to sick contacts.           CONSULTS: (Who and What was discussed)  None    Discussion with Other Profesionals : None    Social Determinants : None    Chronic Conditions: Silvina Nino is a 44 y.o. female who presents to the emergency room with low back pain radiating down to right lower extremity since yesterday.    Ms. Nino was seen in this facility emergency room on 2/1/2025 after she \"passed out twice\" and woke up with numbness in the right hand, right foot.  All workup was negative and she was discharged home.    On 2/16/2025 she returned to [facility] emergency room again with right leg pain, stating that she is \"concerned she may have bone cancer as she lost her dad in his 30s due to osteosarcoma\".  X-ray of the right tib-fib showed no bone tumor.      On 2/20/2025 she was seen at [facility] with right leg pain, \"concerned for possible DVT, after stopping Coumadin a few months ago\".  An ultrasound of the right lower extremity showed no evidence of thromboembolism.      Tonight the patient denies any trauma, denies any injury to the right lower extremity or low back.  She also denies any focal neurological deficit in the lower extremities, denies any saddle anesthesia, denies any

## 2025-02-22 NOTE — DISCHARGE INSTRUCTIONS
Please take the medications prescribed as directed, schedule follow-up appointment with the orthopedic surgeon, Dr. Yuan Bonilla, in [place], [place].    If any new/acute symptoms or worsening of current presentation please go to the closest urgent care or emergency room for prompt reevaluation.

## 2025-04-02 ENCOUNTER — HOSPITAL ENCOUNTER (EMERGENCY)
Facility: HOSPITAL | Age: 45
Discharge: HOME | End: 2025-04-02
Attending: EMERGENCY MEDICINE
Payer: MEDICAID

## 2025-04-02 ENCOUNTER — APPOINTMENT (OUTPATIENT)
Facility: HOSPITAL | Age: 45
End: 2025-04-02
Payer: MEDICAID

## 2025-04-02 VITALS
WEIGHT: 218.26 LBS | OXYGEN SATURATION: 98 % | BODY MASS INDEX: 41.21 KG/M2 | HEIGHT: 61 IN | TEMPERATURE: 97.7 F | DIASTOLIC BLOOD PRESSURE: 90 MMHG | SYSTOLIC BLOOD PRESSURE: 113 MMHG | RESPIRATION RATE: 16 BRPM | HEART RATE: 70 BPM

## 2025-04-02 DIAGNOSIS — R07.9 CHEST PAIN, UNSPECIFIED TYPE: Primary | ICD-10-CM

## 2025-04-02 LAB
ALBUMIN SERPL-MCNC: 4.2 G/DL (ref 3.5–5.2)
ALBUMIN/GLOB SERPL: 1.4 G/DL
ALP SERPL-CCNC: 134 U/L (ref 39–117)
ALT SERPL W P-5'-P-CCNC: 12 U/L (ref 1–33)
ANION GAP SERPL CALCULATED.3IONS-SCNC: 12 MMOL/L (ref 5–15)
AST SERPL-CCNC: 24 U/L (ref 1–32)
BASOPHILS # BLD AUTO: 0.02 10*3/MM3 (ref 0–0.2)
BASOPHILS NFR BLD AUTO: 0.3 % (ref 0–1.5)
BILIRUB SERPL-MCNC: 0.2 MG/DL (ref 0–1.2)
BUN SERPL-MCNC: 10 MG/DL (ref 6–20)
BUN/CREAT SERPL: 14.5 (ref 7–25)
CALCIUM SPEC-SCNC: 9.3 MG/DL (ref 8.6–10.5)
CHLORIDE SERPL-SCNC: 107 MMOL/L (ref 98–107)
CO2 SERPL-SCNC: 24 MMOL/L (ref 22–29)
CREAT SERPL-MCNC: 0.69 MG/DL (ref 0.57–1)
DEPRECATED RDW RBC AUTO: 43.1 FL (ref 37–54)
EGFRCR SERPLBLD CKD-EPI 2021: 110.6 ML/MIN/1.73
EOSINOPHIL # BLD AUTO: 0.17 10*3/MM3 (ref 0–0.4)
EOSINOPHIL NFR BLD AUTO: 2.8 % (ref 0.3–6.2)
ERYTHROCYTE [DISTWIDTH] IN BLOOD BY AUTOMATED COUNT: 15.2 % (ref 12.3–15.4)
GEN 5 1HR TROPONIN T REFLEX: <6 NG/L
GLOBULIN UR ELPH-MCNC: 3.1 GM/DL
GLUCOSE SERPL-MCNC: 96 MG/DL (ref 65–99)
HCT VFR BLD AUTO: 38.4 % (ref 34–46.6)
HGB BLD-MCNC: 11.8 G/DL (ref 12–15.9)
HOLD SPECIMEN: NORMAL
IMM GRANULOCYTES # BLD AUTO: 0 10*3/MM3 (ref 0–0.05)
IMM GRANULOCYTES NFR BLD AUTO: 0 % (ref 0–0.5)
LIPASE SERPL-CCNC: 15 U/L (ref 13–60)
LYMPHOCYTES # BLD AUTO: 1.37 10*3/MM3 (ref 0.7–3.1)
LYMPHOCYTES NFR BLD AUTO: 22.5 % (ref 19.6–45.3)
MCH RBC QN AUTO: 23.7 PG (ref 26.6–33)
MCHC RBC AUTO-ENTMCNC: 30.7 G/DL (ref 31.5–35.7)
MCV RBC AUTO: 77.3 FL (ref 79–97)
MONOCYTES # BLD AUTO: 0.29 10*3/MM3 (ref 0.1–0.9)
MONOCYTES NFR BLD AUTO: 4.8 % (ref 5–12)
NEUTROPHILS NFR BLD AUTO: 4.23 10*3/MM3 (ref 1.7–7)
NEUTROPHILS NFR BLD AUTO: 69.6 % (ref 42.7–76)
NT-PROBNP SERPL-MCNC: 113 PG/ML (ref 0–450)
PLATELET # BLD AUTO: 296 10*3/MM3 (ref 140–450)
PMV BLD AUTO: 9.6 FL (ref 6–12)
POTASSIUM SERPL-SCNC: 4 MMOL/L (ref 3.5–5.2)
PROT SERPL-MCNC: 7.3 G/DL (ref 6–8.5)
RBC # BLD AUTO: 4.97 10*6/MM3 (ref 3.77–5.28)
SODIUM SERPL-SCNC: 143 MMOL/L (ref 136–145)
TROPONIN T NUMERIC DELTA: NORMAL
TROPONIN T SERPL HS-MCNC: <6 NG/L
WBC NRBC COR # BLD AUTO: 6.08 10*3/MM3 (ref 3.4–10.8)
WHOLE BLOOD HOLD COAG: NORMAL
WHOLE BLOOD HOLD SPECIMEN: NORMAL

## 2025-04-02 PROCEDURE — 84484 ASSAY OF TROPONIN QUANT: CPT | Performed by: EMERGENCY MEDICINE

## 2025-04-02 PROCEDURE — 83690 ASSAY OF LIPASE: CPT | Performed by: EMERGENCY MEDICINE

## 2025-04-02 PROCEDURE — 80053 COMPREHEN METABOLIC PANEL: CPT | Performed by: EMERGENCY MEDICINE

## 2025-04-02 PROCEDURE — 93005 ELECTROCARDIOGRAM TRACING: CPT | Performed by: EMERGENCY MEDICINE

## 2025-04-02 PROCEDURE — 83880 ASSAY OF NATRIURETIC PEPTIDE: CPT | Performed by: EMERGENCY MEDICINE

## 2025-04-02 PROCEDURE — 36415 COLL VENOUS BLD VENIPUNCTURE: CPT

## 2025-04-02 PROCEDURE — 93005 ELECTROCARDIOGRAM TRACING: CPT

## 2025-04-02 PROCEDURE — 85025 COMPLETE CBC W/AUTO DIFF WBC: CPT | Performed by: EMERGENCY MEDICINE

## 2025-04-02 PROCEDURE — 99284 EMERGENCY DEPT VISIT MOD MDM: CPT | Performed by: EMERGENCY MEDICINE

## 2025-04-02 PROCEDURE — 71045 X-RAY EXAM CHEST 1 VIEW: CPT

## 2025-04-02 RX ORDER — SODIUM CHLORIDE 0.9 % (FLUSH) 0.9 %
10 SYRINGE (ML) INJECTION AS NEEDED
Status: DISCONTINUED | OUTPATIENT
Start: 2025-04-02 | End: 2025-04-02 | Stop reason: HOSPADM

## 2025-04-02 RX ORDER — METOPROLOL TARTRATE 25 MG/1
25 TABLET, FILM COATED ORAL ONCE
Status: COMPLETED | OUTPATIENT
Start: 2025-04-02 | End: 2025-04-02

## 2025-04-02 RX ORDER — ACETAMINOPHEN AND CODEINE PHOSPHATE 300; 30 MG/1; MG/1
1 TABLET ORAL EVERY 4 HOURS PRN
COMMUNITY

## 2025-04-02 RX ORDER — ASPIRIN 81 MG/1
324 TABLET, CHEWABLE ORAL ONCE
Status: COMPLETED | OUTPATIENT
Start: 2025-04-02 | End: 2025-04-02

## 2025-04-02 RX ORDER — LAMOTRIGINE 100 MG/1
400 TABLET ORAL DAILY
COMMUNITY

## 2025-04-02 RX ADMIN — METOPROLOL TARTRATE 25 MG: 25 TABLET, FILM COATED ORAL at 15:22

## 2025-04-02 RX ADMIN — ASPIRIN 324 MG: 81 TABLET, CHEWABLE ORAL at 14:14

## 2025-04-02 NOTE — FSED PROVIDER NOTE
Subjective  History of Present Illness:    The patient reports dizziness and chest pain intermittently today.  Pt reports central chest pain that radiates to the right arm and back X 2 minutes.  Pt reports diaphoresis, and fatigue.  Pt denies aggravating factors.  Pt denies fever or dyspnea.      Nurses Notes reviewed and agree, including vitals, allergies, social history and prior medical history.     REVIEW OF SYSTEMS:   Review of Systems   Constitutional:  Positive for diaphoresis and fatigue.   Respiratory:  Positive for chest tightness.        Past Medical History:   Diagnosis Date    Anemia     Anxiety     Bleeding disorder     Free bleeding but no hemophelia    Clotting disorder     Ear piercing     Epilepsy     last seizure 3/2022    GERD (gastroesophageal reflux disease)     History of Holter monitoring 01/18/2022    Hx of echocardiogram 12/10/2021    Irregular heart beat     Kidney stone     Murmur     Ovarian cyst     Polycystic ovary syndrome     PONV (postoperative nausea and vomiting)     Seasonal allergies     Spinal headache     Syncope 2021       Allergies:    Amoxicillin      Past Surgical History:   Procedure Laterality Date    CARDIAC ELECTROPHYSIOLOGY PROCEDURE N/A 6/30/2022    Procedure: Loop insertion;  Surgeon: Venkata Arcos MD;  Location: [facility] INVASIVE LOCATION;  Service: Cardiovascular;  Laterality: N/A;    CHOLECYSTECTOMY      DILATATION AND CURETTAGE      EAR TUBES      ENDOSCOPY      EXPLORATORY LAPAROTOMY      GASTRIC BYPASS  2017    GASTRIC BYPASS      TONSILLECTOMY      TOTAL LAPAROSCOPIC HYSTERECTOMY Right 5/3/2022    Procedure: TOTAL LAPAROSCOPIC HYSTERECTOMY,  BILATERTAL SALPINGO-OOPHORECTOMY, CYSTOCOPY;  Surgeon: Taniya Whiteside MD;  Location: [facility] OR;  Service: Obstetrics/Gynecology;  Laterality: Right;         Social History     Socioeconomic History    Marital status:    Tobacco Use    Smoking status: Never    Smokeless tobacco: Never   Vaping Use    Vaping  "status: Never Used   Substance and Sexual Activity    Alcohol use: No    Drug use: No    Sexual activity: Defer     Partners: Male     Birth control/protection: Surgical         Family History   Problem Relation Age of Onset    Heart failure Father     Bleeding Disorder Father     Heart disease Sister     Clotting disorder Sister        Objective  Physical Exam:  /90   Pulse 70   Temp 97.7 °F (36.5 °C) (Oral)   Resp 16   Ht 155 cm (61.02\")   Wt 99 kg (218 lb 4.1 oz)   LMP 04/07/2022 (Exact Date)   SpO2 98%   BMI 41.21 kg/m²      Physical Exam  Vitals and nursing note reviewed.   Constitutional:       Appearance: Normal appearance. She is obese.   HENT:      Right Ear: External ear normal.      Left Ear: External ear normal.      Nose: Nose normal.      Mouth/Throat:      Mouth: Mucous membranes are moist.   Eyes:      Extraocular Movements: Extraocular movements intact.   Cardiovascular:      Rate and Rhythm: Normal rate and regular rhythm.   Pulmonary:      Effort: Pulmonary effort is normal.      Breath sounds: Normal breath sounds.   Musculoskeletal:         General: Normal range of motion.   Skin:     General: Skin is warm and dry.   Neurological:      General: No focal deficit present.      Mental Status: She is alert.   Psychiatric:         Mood and Affect: Mood normal.         Behavior: Behavior normal.         Thought Content: Thought content normal.         Procedures    ED Course:    ED Course as of 04/02/25 2020 Wed Apr 02, 2025   1412 Sinus tachycardia at 117, poor R wave progression, no ST/T wave change [DEREK]   1534 Normal sinus rhythm at 88, normal axis, poor R wave [DEREK]      ED Course User Index  [DEREK] Ge Sharma MD       Lab Results (last 24 hours)       Procedure Component Value Units Date/Time    High Sensitivity Troponin T [[id number]]  (Normal) Collected: 04/02/25 1412    Specimen: Blood Updated: 04/02/25 1434     HS Troponin T <6 ng/L     CBC & Differential [[id number]]  " (Abnormal) Collected: 04/02/25 1412    Specimen: Blood Updated: 04/02/25 1418    Narrative:      The following orders were created for panel order CBC & Differential.  Procedure                               Abnormality         Status                     ---------                               -----------         ------                     CBC Auto Differential[[id number]]        Abnormal            Final result                 Please view results for these tests on the individual orders.    Comprehensive Metabolic Panel [[id number]]  (Abnormal) Collected: 04/02/25 1412    Specimen: Blood Updated: 04/02/25 1437     Glucose 96 mg/dL      BUN 10 mg/dL      Creatinine 0.69 mg/dL      Sodium 143 mmol/L      Potassium 4.0 mmol/L      Chloride 107 mmol/L      CO2 24.0 mmol/L      Calcium 9.3 mg/dL      Total Protein 7.3 g/dL      Albumin 4.2 g/dL      ALT (SGPT) 12 U/L      AST (SGOT) 24 U/L      Alkaline Phosphatase 134 U/L      Total Bilirubin 0.2 mg/dL      Globulin 3.1 gm/dL      A/G Ratio 1.4 g/dL      BUN/Creatinine Ratio 14.5     Anion Gap 12.0 mmol/L      eGFR 110.6 mL/min/1.73     Narrative:      GFR Categories in Chronic Kidney Disease (CKD)      GFR Category          GFR (mL/min/1.73)    Interpretation  G1                     90 or greater         Normal or high (1)  G2                      60-89                Mild decrease (1)  G3a                   45-59                Mild to moderate decrease  G3b                   30-44                Moderate to severe decrease  G4                    15-29                Severe decrease  G5                    14 or less           Kidney failure          (1)In the absence of evidence of kidney disease, neither GFR category G1 or G2 fulfill the criteria for CKD.    eGFR calculation 2021 CKD-EPI creatinine equation, which does not include race as a factor    Lipase [[id number]]  (Normal) Collected: 04/02/25 1412    Specimen: Blood Updated: 04/02/25 1436     Lipase 15 U/L      BNP [[id number]]  (Normal) Collected: 04/02/25 1412    Specimen: Blood Updated: 04/02/25 1434     proBNP 113.0 pg/mL     Narrative:      This assay is used as an aid in the diagnosis of individuals suspected of having heart failure. It can be used as an aid in the diagnosis of acute decompensated heart failure (ADHF) in patients presenting with signs and symptoms of ADHF to the emergency department (ED). In addition, NT-proBNP of <300 pg/mL indicates ADHF is not likely.    Age Range Result Interpretation  NT-proBNP Concentration (pg/mL:      <50             Positive            >450                   Gray                 300-450                    Negative             <300    50-75           Positive            >900                  Gray                300-900                  Negative            <300      >75             Positive            >1800                  Gray                300-1800                  Negative            <300    CBC Auto Differential [[id number]]  (Abnormal) Collected: 04/02/25 1412    Specimen: Blood Updated: 04/02/25 1418     WBC 6.08 10*3/mm3      RBC 4.97 10*6/mm3      Hemoglobin 11.8 g/dL      Hematocrit 38.4 %      MCV 77.3 fL      MCH 23.7 pg      MCHC 30.7 g/dL      RDW 15.2 %      RDW-SD 43.1 fl      MPV 9.6 fL      Platelets 296 10*3/mm3      Neutrophil % 69.6 %      Lymphocyte % 22.5 %      Monocyte % 4.8 %      Eosinophil % 2.8 %      Basophil % 0.3 %      Immature Grans % 0.0 %      Neutrophils, Absolute 4.23 10*3/mm3      Lymphocytes, Absolute 1.37 10*3/mm3      Monocytes, Absolute 0.29 10*3/mm3      Eosinophils, Absolute 0.17 10*3/mm3      Basophils, Absolute 0.02 10*3/mm3      Immature Grans, Absolute 0.00 10*3/mm3     High Sensitivity Troponin T 1Hr [[id number]] Collected: 04/02/25 1517    Specimen: Blood from Arm, Right Updated: 04/02/25 1539     HS Troponin T <6 ng/L      Troponin T Numeric Delta --     Comment: Unable to calculate.       Narrative:      High Sensitive  Troponin T Reference Range:  <14.0 ng/L- Negative Female for AMI  <22.0 ng/L- Negative Male for AMI  >=14 - Abnormal Female indicating possible myocardial injury.  >=22 - Abnormal Male indicating possible myocardial injury.   Clinicians would have to utilize clinical acumen, EKG, Troponin, and serial changes to determine if it is an Acute Myocardial Infarction or myocardial injury due to an underlying chronic condition.                  XR Chest 1 View  Result Date: 4/2/2025  XR CHEST 1 VW Date of Exam: 4/2/2025 2:22 PM EDT Indication: Chest Pain Triage Protocol Comparison: Chest AP dated 6/3/2023 Findings: A loop recorder projects over the left hemithorax. The lungs are clear bilaterally. The cardiac and mediastinal silhouettes appear normal.     Impression: Impression: No acute cardiopulmonary disease Electronically Signed: Rd Snow MD  4/2/2025 2:51 PM EDT  Workstation ID: [id number]         [facility]     Amount and/or Complexity of Data Reviewed  Clinical lab tests: reviewed  Tests in the radiology section of CPT®: reviewed  Tests in the medicine section of CPT®: reviewed        Initial impression of presenting illness: chest pain    DDX: includes but is not limited to: pneumothorax, pleurisy, MI    Patient arrives by private vehicle with vitals interpreted by myself.     Pertinent features from physical exam: normal axam.    Initial diagnostic plan: labs and imaging    Results from initial plan were reviewed and interpreted by me revealing nonactionable    Diagnostic information from other sources: I reviewed the patient's outside studies, pt has had multiple similar presentations in the past.  Pt reports she is not taking her metoprolol      Medications   aspirin chewable tablet 324 mg (324 mg Oral Given 4/2/25 1414)   metoprolol tartrate (LOPRESSOR) tablet 25 mg (25 mg Oral Given 4/2/25 1522)       Results/clinical rationale were discussed with patient      Data interpreted: Nursing notes reviewed, vital signs  reviewed.  CBC with differential, CMP, and troponing  Images independantly reviewed and Chest XR reviewed independently interpreted by me.  EKG independently interpreted by me.  O2 saturation:    Care significantly affected by Social Determinants of Health (housing and economic circumstances, unemployment)               [] Yes     [x] No              If yes, Patient's care significantly limited by  Social Determinants of Health including:              [] Inadequate housing              [] Low income              [] Alcoholism and drug addiction in family              [] Problems related to primary support group              [] Unemployment              [] Problems related to employment              [] Other Social Determinants of Health:     Counseling: Discussed the results above with the patient regarding need for discharged home. Return precautions were given to patient.  Patient understands and agrees plan of care.  The patient was given anticipatory guidance and return precautions and advised of the need for urgent follow up.     -----  ED Disposition       ED Disposition   Discharge    Condition   Stable    Comment   --             Final diagnoses:   Chest pain, unspecified type      Your Follow-Up Providers       Go to  [facility] EMERGENCY DEPARTMENT [place].    Specialty: Emergency Medicine  Follow up details: If symptoms worsen  [address] [address]  [facility] [address] [phone]             Christianne Elizalde, DAVID.    Specialty: Family Medicine  [address] [place], [place] [id number] [phone]                       Contact information for after-discharge care    Follow-up information has not been specified.                    Your medication list        CONTINUE taking these medications        Instructions Last Dose Given Next Dose Due   Acetaminophen Extra Strength 500 MG tablet      Take 2 tablets by mouth Every 8 (Eight) Hours.       acetaminophen-codeine 300-30 MG per  tablet  Commonly known as: TYLENOL with CODEINE #3      Take 1 tablet by mouth Every 4 (Four) Hours As Needed for Moderate Pain.       amitriptyline 10 MG tablet  Commonly known as: ELAVIL      Take 1 tablet by mouth 3 (Three) Times a Day.       celecoxib 100 MG capsule  Commonly known as: CeleBREX           citalopram 40 MG tablet  Commonly known as: CeleXA           Emgality 120 MG/ML auto-injector pen  Generic drug: galcanezumab-gnlm      Inject 120 mg under the skin into the appropriate area as directed Every 30 (Thirty) Days.       estradiol 0.1 MG/24HR patch  Commonly known as: Minivelle      Place 1 patch on the skin as directed by provider 2 (Two) Times a Week.       lamoTRIgine 100 MG tablet  Commonly known as: LaMICtal      Take 4 tablets by mouth Daily.       pramipexole 0.5 MG tablet  Commonly known as: MIRAPEX      Take 0.5 mg by mouth Daily.       propranolol 40 MG tablet  Commonly known as: INDERAL           rizatriptan 10 MG tablet  Commonly known as: MAXALT           tiZANidine 4 MG tablet  Commonly known as: ZANAFLEX           Trokendi  MG capsule sustained-release 24 hr  Generic drug: Topiramate ER      Take 200 mg by mouth Every Night.       Vimpat 200 MG tablet  Generic drug: lacosamide      Take 1 tablet by mouth Every 12 (Twelve) Hours.       Vitamin B 12 100 MCG lozenge      Take  by mouth.

## 2025-04-03 LAB
QT INTERVAL: 330 MS
QT INTERVAL: 390 MS
QTC INTERVAL: 460 MS
QTC INTERVAL: 471 MS

## 2025-04-04 ENCOUNTER — TELEPHONE (OUTPATIENT)
Dept: CARDIOLOGY | Facility: CLINIC | Age: 45
End: 2025-04-04
Payer: MEDICAID

## 2025-04-04 NOTE — TELEPHONE ENCOUNTER
Pt called wanting to schedule appointment for some issues she has been having. Pt last seen in office 6/2022. Pt insurance changed so she switched care, but she is requesting to be reestablished at our practice.     Pt has had 7 syncopal episodes in the last week. She has fallen and hit her head on the counter. Pt reports HR got as low as 30. She is dizzy and fatigued all of the time. She sometimes has pain down her left arm and chest pain. She reports it is like something is sitting on her chest. She is also having unilateral leg pain on the right side. Her BP has been as low as 64/49 (37).     Pt current /69 (46). Pt told she could be set up with us again. For acute/urgent symptoms, pt instructed to follow up with established cardiology team and/or the ED. Pt agreeable.

## 2025-04-21 ENCOUNTER — HOSPITAL ENCOUNTER (EMERGENCY)
Facility: HOSPITAL | Age: 45
Discharge: HOME | End: 2025-04-21
Attending: EMERGENCY MEDICINE | Admitting: EMERGENCY MEDICINE
Payer: MEDICAID

## 2025-04-21 ENCOUNTER — APPOINTMENT (OUTPATIENT)
Facility: HOSPITAL | Age: 45
End: 2025-04-21
Payer: MEDICAID

## 2025-04-21 VITALS
OXYGEN SATURATION: 98 % | TEMPERATURE: 98 F | HEART RATE: 73 BPM | SYSTOLIC BLOOD PRESSURE: 146 MMHG | RESPIRATION RATE: 21 BRPM | HEIGHT: 62 IN | DIASTOLIC BLOOD PRESSURE: 92 MMHG | WEIGHT: 207.6 LBS | BODY MASS INDEX: 38.2 KG/M2

## 2025-04-21 DIAGNOSIS — R10.9 NON-SURGICAL ABDOMINAL PAIN: Primary | ICD-10-CM

## 2025-04-21 DIAGNOSIS — R91.1 PULMONARY NODULE: ICD-10-CM

## 2025-04-21 LAB
ALBUMIN SERPL-MCNC: 4.3 G/DL (ref 3.5–5.2)
ALBUMIN/GLOB SERPL: 1.2 G/DL
ALP SERPL-CCNC: 139 U/L (ref 39–117)
ALT SERPL W P-5'-P-CCNC: 18 U/L (ref 1–33)
ANION GAP SERPL CALCULATED.3IONS-SCNC: 12.5 MMOL/L (ref 5–15)
AST SERPL-CCNC: 31 U/L (ref 1–32)
B PARAPERT DNA SPEC QL NAA+PROBE: NOT DETECTED
B PERT DNA SPEC QL NAA+PROBE: NOT DETECTED
BASOPHILS # BLD AUTO: 0.01 10*3/MM3 (ref 0–0.2)
BASOPHILS NFR BLD AUTO: 0.2 % (ref 0–1.5)
BILIRUB SERPL-MCNC: 0.3 MG/DL (ref 0–1.2)
BILIRUB UR QL STRIP: NEGATIVE
BUN SERPL-MCNC: 7 MG/DL (ref 6–20)
BUN/CREAT SERPL: 9.7 (ref 7–25)
C PNEUM DNA NPH QL NAA+NON-PROBE: NOT DETECTED
CALCIUM SPEC-SCNC: 9.6 MG/DL (ref 8.6–10.5)
CHLORIDE SERPL-SCNC: 106 MMOL/L (ref 98–107)
CLARITY UR: CLEAR
CO2 SERPL-SCNC: 24.5 MMOL/L (ref 22–29)
COLOR UR: YELLOW
CREAT SERPL-MCNC: 0.72 MG/DL (ref 0.57–1)
DEPRECATED RDW RBC AUTO: 44.2 FL (ref 37–54)
EGFRCR SERPLBLD CKD-EPI 2021: 106.5 ML/MIN/1.73
EOSINOPHIL # BLD AUTO: 0.17 10*3/MM3 (ref 0–0.4)
EOSINOPHIL NFR BLD AUTO: 3.6 % (ref 0.3–6.2)
ERYTHROCYTE [DISTWIDTH] IN BLOOD BY AUTOMATED COUNT: 15.6 % (ref 12.3–15.4)
FLUAV SUBTYP SPEC NAA+PROBE: NOT DETECTED
FLUBV RNA ISLT QL NAA+PROBE: NOT DETECTED
GLOBULIN UR ELPH-MCNC: 3.7 GM/DL
GLUCOSE SERPL-MCNC: 94 MG/DL (ref 65–99)
GLUCOSE UR STRIP-MCNC: NEGATIVE MG/DL
HADV DNA SPEC NAA+PROBE: NOT DETECTED
HCOV 229E RNA SPEC QL NAA+PROBE: NOT DETECTED
HCOV HKU1 RNA SPEC QL NAA+PROBE: NOT DETECTED
HCOV NL63 RNA SPEC QL NAA+PROBE: NOT DETECTED
HCOV OC43 RNA SPEC QL NAA+PROBE: NOT DETECTED
HCT VFR BLD AUTO: 39.9 % (ref 34–46.6)
HGB BLD-MCNC: 12.1 G/DL (ref 12–15.9)
HGB UR QL STRIP.AUTO: NEGATIVE
HMPV RNA NPH QL NAA+NON-PROBE: NOT DETECTED
HOLD SPECIMEN: NORMAL
HPIV1 RNA ISLT QL NAA+PROBE: NOT DETECTED
HPIV2 RNA SPEC QL NAA+PROBE: NOT DETECTED
HPIV3 RNA NPH QL NAA+PROBE: NOT DETECTED
HPIV4 P GENE NPH QL NAA+PROBE: NOT DETECTED
IMM GRANULOCYTES # BLD AUTO: 0.01 10*3/MM3 (ref 0–0.05)
IMM GRANULOCYTES NFR BLD AUTO: 0.2 % (ref 0–0.5)
KETONES UR QL STRIP: NEGATIVE
LEUKOCYTE ESTERASE UR QL STRIP.AUTO: NEGATIVE
LIPASE SERPL-CCNC: 13 U/L (ref 13–60)
LYMPHOCYTES # BLD AUTO: 1.3 10*3/MM3 (ref 0.7–3.1)
LYMPHOCYTES NFR BLD AUTO: 27.4 % (ref 19.6–45.3)
M PNEUMO IGG SER IA-ACNC: NOT DETECTED
MCH RBC QN AUTO: 23.5 PG (ref 26.6–33)
MCHC RBC AUTO-ENTMCNC: 30.3 G/DL (ref 31.5–35.7)
MCV RBC AUTO: 77.5 FL (ref 79–97)
MONOCYTES # BLD AUTO: 0.36 10*3/MM3 (ref 0.1–0.9)
MONOCYTES NFR BLD AUTO: 7.6 % (ref 5–12)
NEUTROPHILS NFR BLD AUTO: 2.9 10*3/MM3 (ref 1.7–7)
NEUTROPHILS NFR BLD AUTO: 61 % (ref 42.7–76)
NITRITE UR QL STRIP: NEGATIVE
PH UR STRIP.AUTO: 6 [PH] (ref 5–8)
PLATELET # BLD AUTO: 318 10*3/MM3 (ref 140–450)
PMV BLD AUTO: 9.8 FL (ref 6–12)
POTASSIUM SERPL-SCNC: 3.7 MMOL/L (ref 3.5–5.2)
PROT SERPL-MCNC: 8 G/DL (ref 6–8.5)
PROT UR QL STRIP: NEGATIVE
RBC # BLD AUTO: 5.15 10*6/MM3 (ref 3.77–5.28)
RHINOVIRUS RNA SPEC NAA+PROBE: NOT DETECTED
RSV RNA NPH QL NAA+NON-PROBE: NOT DETECTED
SARS-COV-2 RNA RESP QL NAA+PROBE: NOT DETECTED
SODIUM SERPL-SCNC: 143 MMOL/L (ref 136–145)
SP GR UR STRIP: <=1.005 (ref 1–1.03)
UROBILINOGEN UR QL STRIP: NORMAL
WBC NRBC COR # BLD AUTO: 4.75 10*3/MM3 (ref 3.4–10.8)
WHOLE BLOOD HOLD COAG: NORMAL
WHOLE BLOOD HOLD SPECIMEN: NORMAL

## 2025-04-21 PROCEDURE — 25010000002 DROPERIDOL PER 5 MG: Performed by: EMERGENCY MEDICINE

## 2025-04-21 PROCEDURE — 0202U NFCT DS 22 TRGT SARS-COV-2: CPT | Performed by: EMERGENCY MEDICINE

## 2025-04-21 PROCEDURE — 96374 THER/PROPH/DIAG INJ IV PUSH: CPT

## 2025-04-21 PROCEDURE — 83690 ASSAY OF LIPASE: CPT | Performed by: EMERGENCY MEDICINE

## 2025-04-21 PROCEDURE — 25510000002 DIATRIZOATE MEGLUMINE & SODIUM PER 1 ML

## 2025-04-21 PROCEDURE — 74176 CT ABD & PELVIS W/O CONTRAST: CPT

## 2025-04-21 PROCEDURE — 85025 COMPLETE CBC W/AUTO DIFF WBC: CPT | Performed by: EMERGENCY MEDICINE

## 2025-04-21 PROCEDURE — 99284 EMERGENCY DEPT VISIT MOD MDM: CPT | Performed by: EMERGENCY MEDICINE

## 2025-04-21 PROCEDURE — 80053 COMPREHEN METABOLIC PANEL: CPT | Performed by: EMERGENCY MEDICINE

## 2025-04-21 PROCEDURE — 25810000003 LACTATED RINGERS SOLUTION: Performed by: EMERGENCY MEDICINE

## 2025-04-21 PROCEDURE — 81003 URINALYSIS AUTO W/O SCOPE: CPT | Performed by: EMERGENCY MEDICINE

## 2025-04-21 RX ORDER — DIATRIZOATE MEGLUMINE AND DIATRIZOATE SODIUM 660; 100 MG/ML; MG/ML
SOLUTION ORAL; RECTAL
Status: COMPLETED
Start: 2025-04-21 | End: 2025-04-21

## 2025-04-21 RX ORDER — ONDANSETRON 4 MG/1
4 TABLET, ORALLY DISINTEGRATING ORAL EVERY 8 HOURS PRN
Qty: 30 TABLET | Refills: 0 | Status: SHIPPED | OUTPATIENT
Start: 2025-04-21

## 2025-04-21 RX ORDER — SODIUM CHLORIDE 9 MG/ML
10 INJECTION, SOLUTION INTRAMUSCULAR; INTRAVENOUS; SUBCUTANEOUS AS NEEDED
Status: DISCONTINUED | OUTPATIENT
Start: 2025-04-21 | End: 2025-04-21 | Stop reason: HOSPADM

## 2025-04-21 RX ORDER — DROPERIDOL 2.5 MG/ML
1.25 INJECTION, SOLUTION INTRAMUSCULAR; INTRAVENOUS ONCE
Status: COMPLETED | OUTPATIENT
Start: 2025-04-21 | End: 2025-04-21

## 2025-04-21 RX ADMIN — SODIUM CHLORIDE, POTASSIUM CHLORIDE, SODIUM LACTATE AND CALCIUM CHLORIDE 1000 ML: 600; 310; 30; 20 INJECTION, SOLUTION INTRAVENOUS at 10:05

## 2025-04-21 RX ADMIN — DROPERIDOL 1.25 MG: 2.5 INJECTION, SOLUTION INTRAMUSCULAR; INTRAVENOUS at 10:03

## 2025-04-21 RX ADMIN — DIATRIZOATE MEGLUMINE AND DIATRIZOATE SODIUM 15 ML: 660; 100 LIQUID ORAL; RECTAL at 10:05

## 2025-04-21 NOTE — DISCHARGE INSTRUCTIONS
Please follow-up with primary care to further discuss your current symptoms and the pulmonary nodule found on scan.  This may need follow-up imaging in the future to ensure it is not enlarging.

## 2025-04-21 NOTE — FSED PROVIDER NOTE
Subjective  History of Present Illness:    Arrives with concern for nausea vomiting and seizure-like activity.  Reports she has had fever at home of 103.  Unable to keep her medications down.  Mild diffuse abdominal pain.  Intermittent constipation and diarrhea.  No hemorrhage reported.      Has reported history of seizure disorder maintained on Vimpat topiramate lamotrigine.  Last seen in an emergency department on 4/2/2025.  Evaluated for dizziness and chest pain at that time.  Workup was nonactionable.  Last neurology note is from 4/20/2024 which is an office visit with [facility] neurology.  Is felt she has nonepileptic spells.  There is a recommendation to continue her lamotrigine.    Patient had recent diagnostic imaging performed on/18/25.  Results of the imaging demonstrated fatty liver, evidence of prior gastric bypass with fluid in the gastric remnant suggestive of GG fistula or reflux.  The scans were ordered through [facility] system.  Appears to have been ordered through the emergency department on 4/18/2025.  [facility].    Nurses Notes reviewed and agree, including vitals, allergies, social history and prior medical history.     REVIEW OF SYSTEMS: All systems reviewed and not pertinent unless noted.    Past Medical History:   Diagnosis Date    Anemia     Anxiety     Bleeding disorder     Free bleeding but no hemophelia    Clotting disorder     Ear piercing     Epilepsy     last seizure 3/2022    GERD (gastroesophageal reflux disease)     History of Holter monitoring 01/18/2022    Hx of echocardiogram 12/10/2021    Irregular heart beat     Kidney stone     Murmur     Ovarian cyst     Polycystic ovary syndrome     PONV (postoperative nausea and vomiting)     Seasonal allergies     Spinal headache     Syncope 2021       Allergies:    Amoxicillin      Past Surgical History:   Procedure Laterality Date    CARDIAC ELECTROPHYSIOLOGY PROCEDURE N/A 6/30/2022    Procedure: Loop insertion;  Surgeon: Isrrael  "MD Venkata;  Location:  [facility];  Service: Cardiovascular;  Laterality: N/A;    CHOLECYSTECTOMY      DILATATION AND CURETTAGE      EAR TUBES      ENDOSCOPY      EXPLORATORY LAPAROTOMY      GASTRIC BYPASS  2017    GASTRIC BYPASS      TONSILLECTOMY      TOTAL LAPAROSCOPIC HYSTERECTOMY Right 5/3/2022    Procedure: TOTAL LAPAROSCOPIC HYSTERECTOMY,  BILATERTAL SALPINGO-OOPHORECTOMY, CYSTOCOPY;  Surgeon: Taniya Whiteside MD;  Location: [facility] OR;  Service: Obstetrics/Gynecology;  Laterality: Right;         Social History     Socioeconomic History    Marital status:    Tobacco Use    Smoking status: Never    Smokeless tobacco: Never   Vaping Use    Vaping status: Never Used   Substance and Sexual Activity    Alcohol use: No    Drug use: No    Sexual activity: Defer     Partners: Male     Birth control/protection: Surgical         Family History   Problem Relation Age of Onset    Heart failure Father     Bleeding Disorder Father     Heart disease Sister     Clotting disorder Sister        Objective  Physical Exam:  BP (S) 146/92 (BP Location: Left arm, Patient Position: Lying)   Pulse 73   Temp 98 °F (36.7 °C)   Resp 21   Ht 157.5 cm (62\")   Wt 94.2 kg (207 lb 9.6 oz)   LMP 04/07/2022 (Exact Date)   SpO2 98%   BMI 37.97 kg/m²      Physical Exam    [Primary Survey    Airway: Patent and protected  Breathing: Symmetric bilaterally  Circulation: Mentating well, responsive        Constitutional: Nontoxic appearance.  Psychological: No abnormalities of mood affect.  Head: Atraumatic  Eyes: Conjunctiva are non-injected. no scleral icterus.  ENT: No obvious congestion or obstruction noted  Neck: No obvious deformity.  ROM appears preserved  Chest: No deformity noted.  No paradoxical breathing noted  Respiratory: Respiratory effort was normal - no use of accessory respiratory muscles noted.  There is no stridor.  Cardiovascular: Perfusion appears preserved - mentating well RRR no " murmurs  Gastrointestinal: Abdomen nondistended.  Soft nontender  Genitourinary: Not examined  Lymphatic: Not examined  Back: Not examined  Musculoskeletal: Musculoskeletal system is grossly intact.  There is no obvious deformity.  Neurological: Face: No Asymmetry.  Gross motor movement is intact in all 4 extremities.  Walks and ambulates without difficulty.  Patient exhibits normal speech.  Skin: No Pallor no obvious bruising.  No obvious rash.]      ED Course:      Lab Results (last 24 hours)       Procedure Component Value Units Date/Time    CBC & Differential [[id number]]  (Abnormal) Collected: 04/21/25 0925    Specimen: Blood Updated: 04/21/25 0933    Narrative:      The following orders were created for panel order CBC & Differential.  Procedure                               Abnormality         Status                     ---------                               -----------         ------                     CBC Auto Differential[[id number]]        Abnormal            Final result                 Please view results for these tests on the individual orders.    Comprehensive Metabolic Panel [[id number]]  (Abnormal) Collected: 04/21/25 0925    Specimen: Blood Updated: 04/21/25 0951     Glucose 94 mg/dL      BUN 7 mg/dL      Creatinine 0.72 mg/dL      Sodium 143 mmol/L      Potassium 3.7 mmol/L      Chloride 106 mmol/L      CO2 24.5 mmol/L      Calcium 9.6 mg/dL      Total Protein 8.0 g/dL      Albumin 4.3 g/dL      ALT (SGPT) 18 U/L      AST (SGOT) 31 U/L      Alkaline Phosphatase 139 U/L      Total Bilirubin 0.3 mg/dL      Globulin 3.7 gm/dL      A/G Ratio 1.2 g/dL      BUN/Creatinine Ratio 9.7     Anion Gap 12.5 mmol/L      eGFR 106.5 mL/min/1.73     Narrative:      GFR Categories in Chronic Kidney Disease (CKD)      GFR Category          GFR (mL/min/1.73)    Interpretation  G1                     90 or greater         Normal or high (1)  G2                      60-89                Mild decrease (1)  G3a                    45-59                Mild to moderate decrease  G3b                   30-44                Moderate to severe decrease  G4                    15-29                Severe decrease  G5                    14 or less           Kidney failure          (1)In the absence of evidence of kidney disease, neither GFR category G1 or G2 fulfill the criteria for CKD.    eGFR calculation 2021 CKD-EPI creatinine equation, which does not include race as a factor    Lipase [[id number]]  (Normal) Collected: 04/21/25 0925    Specimen: Blood Updated: 04/21/25 0950     Lipase 13 U/L     CBC Auto Differential [[id number]]  (Abnormal) Collected: 04/21/25 0925    Specimen: Blood Updated: 04/21/25 0933     WBC 4.75 10*3/mm3      RBC 5.15 10*6/mm3      Hemoglobin 12.1 g/dL      Hematocrit 39.9 %      MCV 77.5 fL      MCH 23.5 pg      MCHC 30.3 g/dL      RDW 15.6 %      RDW-SD 44.2 fl      MPV 9.8 fL      Platelets 318 10*3/mm3      Neutrophil % 61.0 %      Lymphocyte % 27.4 %      Monocyte % 7.6 %      Eosinophil % 3.6 %      Basophil % 0.2 %      Immature Grans % 0.2 %      Neutrophils, Absolute 2.90 10*3/mm3      Lymphocytes, Absolute 1.30 10*3/mm3      Monocytes, Absolute 0.36 10*3/mm3      Eosinophils, Absolute 0.17 10*3/mm3      Basophils, Absolute 0.01 10*3/mm3      Immature Grans, Absolute 0.01 10*3/mm3     Urinalysis With Microscopic If Indicated (No Culture) - Urine, Clean Catch [[id number]]  (Normal) Collected: 04/21/25 0927    Specimen: Urine, Clean Catch Updated: 04/21/25 0934     Color, UA Yellow     Appearance, UA Clear     pH, UA 6.0     Specific Gravity, UA <=1.005     Glucose, UA Negative     Ketones, UA Negative     Bilirubin, UA Negative     Blood, UA Negative     Protein, UA Negative     Leuk Esterase, UA Negative     Nitrite, UA Negative     Urobilinogen, UA 0.2 E.U./dL    Narrative:      Urine microscopic not indicated.    Respiratory Panel PCR w/COVID-19(SARS-CoV-2) [facility] In-House, NP Swab in  UTM/VTM, 2 HR TAT - Swab, Nasopharynx [[id number]]  (Normal) Collected: 04/21/25 0956    Specimen: Swab from Nasopharynx Updated: 04/21/25 1054     ADENOVIRUS, PCR Not Detected     Coronavirus 229E Not Detected     Coronavirus HKU1 Not Detected     Coronavirus NL63 Not Detected     Coronavirus OC43 Not Detected     COVID19 Not Detected     Human Metapneumovirus Not Detected     Human Rhinovirus/Enterovirus Not Detected     Influenza A PCR Not Detected     Influenza B PCR Not Detected     Parainfluenza Virus 1 Not Detected     Parainfluenza Virus 2 Not Detected     Parainfluenza Virus 3 Not Detected     Parainfluenza Virus 4 Not Detected     RSV, PCR Not Detected     Bordetella pertussis pcr Not Detected     Bordetella parapertussis PCR Not Detected     Chlamydophila pneumoniae PCR Not Detected     Mycoplasma pneumo by PCR Not Detected    Narrative:      In the setting of a positive respiratory panel with a viral infection PLUS a negative procalcitonin without other underlying concern for bacterial infection, consider observing off antibiotics or discontinuation of antibiotics and continue supportive care. If the respiratory panel is positive for atypical bacterial infection (Bordetella pertussis, Chlamydophila pneumoniae, or Mycoplasma pneumoniae), consider antibiotic de-escalation to target atypical bacterial infection.             CT Abdomen Pelvis Without Contrast  Result Date: 4/21/2025  CT ABDOMEN PELVIS WO CONTRAST Date of Exam: 4/21/2025 11:10 AM EDT Indication: vomiting, recent concern for gastric bypass fistula. Comparison: None available. Technique: Axial CT images were obtained of the abdomen and pelvis without the administration of contrast. Reconstructed coronal and sagittal images were also obtained. Automated exposure control and iterative construction methods were used. Orally administered contrast present. Findings: Benign calcified granuloma in the right lung base. 3 mm noncalcified nodule in the  left lower lobe (series 4 image 6). No focal airspace consolidation. No morphologic changes of chronic liver disease. No evidence of focal liver lesion on this noncontrast exam. Cholecystectomy. No findings of acute pancreatitis. Spleen mildly enlarged measuring 14 cm in craniocaudal dimension. No adrenal nodule. Kidneys are symmetric in size without hydronephrosis. No nephrolithiasis. Urinary bladder is unremarkable. Hysterectomy. Orally administered contrast is present. Postsurgical changes of Kate-en-Y gastric bypass. Orally administered contrast is present within the gastric pouch, Kate limb, and downstream small bowel and colon. No contrast within the excluded stomach. No extraluminal contrast. No evidence of acute bowel inflammation or bowel obstruction. Mild colonic diverticulosis without evidence of acute diverticulitis. Normal appendix. No free air. No free fluid or organized fluid collection. No pathologically enlarged lymph nodes. No abdominal aortic aneurysm. No body wall soft tissue abnormality. No acute or suspicious osseous lesion.     Impression: Impression: No acute findings in the abdomen or pelvis on this noncontrast exam. Kate-en-Y gastric bypass without evidence of complication. Mild splenomegaly. 3 mm noncalcified left lower lobe pulmonary nodule, of low suspicion. Recommend comparison with an outside study for evaluation of temporal stability. If not available, per Fleischner Society guidelines for an incidentally found single solid nodule measuring 6 mm and less, no follow-up is needed if the patient is considered at low risk for lung cancer. If the patient is considered to be high-risk at for lung cancer, then a low-dose follow-up CT in 12 months can be considered. Chronic/ancillary findings as above. Electronically Signed: Filiberto Reyna MD  4/21/2025 11:36 AM EDT  Workstation ID: [id number]       Telemetry Scan   Final Result          Procedures    MDM      Initial impression of presenting  illness : Adult female arrives with abdominal pain.  Prior scan showed potential fistula versus reflux open/18.  Initial physical presentation is nonactionable.  Highly suspect a gastroenteritis given symptoms and fever.  Will work to control symptoms and investigation    diagnostic and therapeutic plan was ordered and interpreted by GILL Quintanilla MD with emphasis on identifying and treating emergent/urgent morbid conditions likely associated with the differential diagnosis with this type of presentation.      ED Course as of 04/21/25 1148   Mon Apr 21, 2025   1057 Laboratory studies are nonactionable.  Despite her reported continuous vomiting she has no electrolyte disturbance in her CMP.  Lipase is low suggesting no element of pancreatitis.  No anemia.  Viral panel negative.    She has responded well to droperidol for nausea. [JAZZY]   1144 CT demonstrates Kate-en-Y bypass without complication.  Previous findings were likely related to reflux.  She has a noncalcified pulmonary nodule in the left lower lobe.  This will need to be followed up with primary care.  Discussed with patient.  Will discharge.  She is now tolerating oral intake. [JAZZY]      ED Course User Index  [JAZZY] Brett Quintanilla MD        Medications   Sodium Chloride (PF) 0.9 % 10 mL (has no administration in time range)   lactated ringers bolus 1,000 mL (1,000 mL Intravenous New Bag 4/21/25 1005)   droperidol (INAPSINE) injection 1.25 mg (1.25 mg Intravenous Given 4/21/25 1003)   diatrizoate meglumine-sodium (GASTROGRAFIN) 66-10 % oral solution (15 mL  Given 4/21/25 1005)       HEART SCORE   No data recorded           -----  ED Disposition       ED Disposition   Discharge    Condition   Stable    Comment   --             Final diagnoses:   Non-surgical abdominal pain   Pulmonary nodule      Your Follow-Up Providers       Schedule an appointment as soon as possible for a visit  with Christianne Elizalde APRN.    Specialty: Family Medicine  [address] [place] [address]  [place] [address]  [phone]                       Contact information for after-discharge care    Follow-up information has not been specified.                    Your medication list        START taking these medications        Instructions Last Dose Given Next Dose Due   ondansetron ODT 4 MG disintegrating tablet  Commonly known as: ZOFRAN-ODT      Place 1 tablet on the tongue Every 8 (Eight) Hours As Needed for Nausea or Vomiting.              STOP taking these medications      amitriptyline 10 MG tablet  Commonly known as: ELAVIL        estradiol 0.1 MG/24HR patch  Commonly known as: Minivelle        tiZANidine 4 MG tablet  Commonly known as: ZANAFLEX        Trokendi  MG capsule sustained-release 24 hr  Generic drug: Topiramate ER        Vimpat 200 MG tablet  Generic drug: lacosamide               ASK your doctor about these medications        Instructions Last Dose Given Next Dose Due   Acetaminophen Extra Strength 500 MG tablet      Take 2 tablets by mouth Every 8 (Eight) Hours.       acetaminophen-codeine 300-30 MG per tablet  Commonly known as: TYLENOL with CODEINE #3      Take 1 tablet by mouth Every 4 (Four) Hours As Needed for Moderate Pain.       celecoxib 100 MG capsule  Commonly known as: CeleBREX           citalopram 40 MG tablet  Commonly known as: CeleXA           Emgality 120 MG/ML auto-injector pen  Generic drug: galcanezumab-gnlm      Inject 120 mg under the skin into the appropriate area as directed Every 30 (Thirty) Days.       lamoTRIgine 100 MG tablet  Commonly known as: LaMICtal      Take 4 tablets by mouth Daily.       pramipexole 0.5 MG tablet  Commonly known as: MIRAPEX      Take 0.5 mg by mouth Daily.       propranolol 40 MG tablet  Commonly known as: INDERAL           rizatriptan 10 MG tablet  Commonly known as: MAXALT           Vitamin B 12 100 MCG lozenge      Take  by mouth.                 Where to Get Your Medications        These medications were sent to [facility] [id number]  - [place], [place] - [address] - [phone] PH - [phone] FX  [address], [place] [address]      Phone: [phone]   ondansetron ODT 4 MG disintegrating tablet

## 2025-04-27 LAB
QT INTERVAL: 330 MS
QT INTERVAL: 390 MS
QTC INTERVAL: 460 MS
QTC INTERVAL: 471 MS

## 2025-04-28 ENCOUNTER — HOSPITAL ENCOUNTER (OUTPATIENT)
Dept: MRI IMAGING | Facility: HOSPITAL | Age: 45
Discharge: HOME | End: 2025-04-28
Payer: MEDICAID

## 2025-04-28 DIAGNOSIS — M54.50 LUMBAR PAIN: ICD-10-CM

## 2025-04-28 DIAGNOSIS — M54.2 CERVICALGIA: ICD-10-CM

## 2025-04-28 PROCEDURE — 72148 MRI LUMBAR SPINE W/O DYE: CPT

## 2025-04-28 PROCEDURE — 72141 MRI NECK SPINE W/O DYE: CPT

## 2025-04-30 ENCOUNTER — TELEPHONE (OUTPATIENT)
Dept: OTHER | Facility: HOSPITAL | Age: 45
End: 2025-04-30
Payer: MEDICAID

## 2025-04-30 NOTE — TELEPHONE ENCOUNTER
SW patient who called to discuss the results of her CT of her Abd/Pelvis recently performed while she was in the ER at [facility].  She was concerned because she had received a patient letter.  I addressed her concerns regarding her findings and relayed to her the recommendations per the radiologist and Fleischner criteria for incidental lung nodules.  I provided her with the phone number for [facility] referrals. If she wishes to be seen by one of our  pulmonary providers in the future, her PCP can put that referral in for her and send over the needed paperwork.  She v/u and has my number if she has further questions or concerns.

## 2025-05-12 ENCOUNTER — APPOINTMENT (OUTPATIENT)
Dept: CT IMAGING | Facility: HOSPITAL | Age: 45
End: 2025-05-12
Payer: MEDICAID

## 2025-05-12 ENCOUNTER — HOSPITAL ENCOUNTER (EMERGENCY)
Facility: HOSPITAL | Age: 45
Discharge: HOME | End: 2025-05-12
Attending: STUDENT IN AN ORGANIZED HEALTH CARE EDUCATION/TRAINING PROGRAM | Admitting: STUDENT IN AN ORGANIZED HEALTH CARE EDUCATION/TRAINING PROGRAM
Payer: MEDICAID

## 2025-05-12 VITALS
HEIGHT: 61 IN | DIASTOLIC BLOOD PRESSURE: 98 MMHG | RESPIRATION RATE: 17 BRPM | OXYGEN SATURATION: 100 % | BODY MASS INDEX: 39.08 KG/M2 | SYSTOLIC BLOOD PRESSURE: 131 MMHG | TEMPERATURE: 97.8 F | HEART RATE: 73 BPM | WEIGHT: 207 LBS

## 2025-05-12 DIAGNOSIS — R10.84 GENERALIZED ABDOMINAL PAIN: Primary | ICD-10-CM

## 2025-05-12 LAB
ALBUMIN SERPL-MCNC: 4.1 G/DL (ref 3.5–5.2)
ALBUMIN/GLOB SERPL: 1.2 G/DL
ALP SERPL-CCNC: 117 U/L (ref 39–117)
ALT SERPL W P-5'-P-CCNC: 12 U/L (ref 1–33)
ANION GAP SERPL CALCULATED.3IONS-SCNC: 11.8 MMOL/L (ref 5–15)
AST SERPL-CCNC: 17 U/L (ref 1–32)
BASOPHILS # BLD AUTO: 0.04 10*3/MM3 (ref 0–0.2)
BASOPHILS NFR BLD AUTO: 0.6 % (ref 0–1.5)
BILIRUB SERPL-MCNC: 0.2 MG/DL (ref 0–1.2)
BILIRUB UR QL STRIP: NEGATIVE
BUN SERPL-MCNC: 10 MG/DL (ref 6–20)
BUN/CREAT SERPL: 12.5 (ref 7–25)
CALCIUM SPEC-SCNC: 9.2 MG/DL (ref 8.6–10.5)
CHLORIDE SERPL-SCNC: 105 MMOL/L (ref 98–107)
CLARITY UR: CLEAR
CO2 SERPL-SCNC: 24.2 MMOL/L (ref 22–29)
COLOR UR: YELLOW
CREAT SERPL-MCNC: 0.8 MG/DL (ref 0.57–1)
CRP SERPL-MCNC: 0.62 MG/DL (ref 0–0.5)
D-LACTATE SERPL-SCNC: 1.3 MMOL/L (ref 0.5–2)
DEPRECATED RDW RBC AUTO: 44.8 FL (ref 37–54)
EGFRCR SERPLBLD CKD-EPI 2021: 93.9 ML/MIN/1.73
EOSINOPHIL # BLD AUTO: 0.16 10*3/MM3 (ref 0–0.4)
EOSINOPHIL NFR BLD AUTO: 2.3 % (ref 0.3–6.2)
ERYTHROCYTE [DISTWIDTH] IN BLOOD BY AUTOMATED COUNT: 16 % (ref 12.3–15.4)
GLOBULIN UR ELPH-MCNC: 3.3 GM/DL
GLUCOSE SERPL-MCNC: 86 MG/DL (ref 65–99)
GLUCOSE UR STRIP-MCNC: NEGATIVE MG/DL
HCT VFR BLD AUTO: 37.3 % (ref 34–46.6)
HGB BLD-MCNC: 11.6 G/DL (ref 12–15.9)
HGB UR QL STRIP.AUTO: NEGATIVE
HOLD SPECIMEN: NORMAL
HOLD SPECIMEN: NORMAL
IMM GRANULOCYTES # BLD AUTO: 0.02 10*3/MM3 (ref 0–0.05)
IMM GRANULOCYTES NFR BLD AUTO: 0.3 % (ref 0–0.5)
KETONES UR QL STRIP: NEGATIVE
LEUKOCYTE ESTERASE UR QL STRIP.AUTO: NEGATIVE
LIPASE SERPL-CCNC: 25 U/L (ref 13–60)
LYMPHOCYTES # BLD AUTO: 1.97 10*3/MM3 (ref 0.7–3.1)
LYMPHOCYTES NFR BLD AUTO: 28.2 % (ref 19.6–45.3)
MAGNESIUM SERPL-MCNC: 2.2 MG/DL (ref 1.6–2.6)
MCH RBC QN AUTO: 24.1 PG (ref 26.6–33)
MCHC RBC AUTO-ENTMCNC: 31.1 G/DL (ref 31.5–35.7)
MCV RBC AUTO: 77.4 FL (ref 79–97)
MONOCYTES # BLD AUTO: 0.37 10*3/MM3 (ref 0.1–0.9)
MONOCYTES NFR BLD AUTO: 5.3 % (ref 5–12)
NEUTROPHILS NFR BLD AUTO: 4.42 10*3/MM3 (ref 1.7–7)
NEUTROPHILS NFR BLD AUTO: 63.3 % (ref 42.7–76)
NITRITE UR QL STRIP: NEGATIVE
NRBC BLD AUTO-RTO: 0 /100 WBC (ref 0–0.2)
PH UR STRIP.AUTO: 6 [PH] (ref 5–8)
PLATELET # BLD AUTO: 327 10*3/MM3 (ref 140–450)
PMV BLD AUTO: 9.7 FL (ref 6–12)
POTASSIUM SERPL-SCNC: 4 MMOL/L (ref 3.5–5.2)
PROT SERPL-MCNC: 7.4 G/DL (ref 6–8.5)
PROT UR QL STRIP: NEGATIVE
RBC # BLD AUTO: 4.82 10*6/MM3 (ref 3.77–5.28)
SODIUM SERPL-SCNC: 141 MMOL/L (ref 136–145)
SP GR UR STRIP: 1.02 (ref 1–1.03)
UROBILINOGEN UR QL STRIP: NORMAL
WBC NRBC COR # BLD AUTO: 6.98 10*3/MM3 (ref 3.4–10.8)
WHOLE BLOOD HOLD COAG: NORMAL
WHOLE BLOOD HOLD SPECIMEN: NORMAL

## 2025-05-12 PROCEDURE — 74177 CT ABD & PELVIS W/CONTRAST: CPT

## 2025-05-12 PROCEDURE — 86140 C-REACTIVE PROTEIN: CPT | Performed by: STUDENT IN AN ORGANIZED HEALTH CARE EDUCATION/TRAINING PROGRAM

## 2025-05-12 PROCEDURE — 83735 ASSAY OF MAGNESIUM: CPT | Performed by: STUDENT IN AN ORGANIZED HEALTH CARE EDUCATION/TRAINING PROGRAM

## 2025-05-12 PROCEDURE — 80053 COMPREHEN METABOLIC PANEL: CPT | Performed by: STUDENT IN AN ORGANIZED HEALTH CARE EDUCATION/TRAINING PROGRAM

## 2025-05-12 PROCEDURE — 85025 COMPLETE CBC W/AUTO DIFF WBC: CPT | Performed by: STUDENT IN AN ORGANIZED HEALTH CARE EDUCATION/TRAINING PROGRAM

## 2025-05-12 PROCEDURE — 99285 EMERGENCY DEPT VISIT HI MDM: CPT | Performed by: STUDENT IN AN ORGANIZED HEALTH CARE EDUCATION/TRAINING PROGRAM

## 2025-05-12 PROCEDURE — 83605 ASSAY OF LACTIC ACID: CPT | Performed by: STUDENT IN AN ORGANIZED HEALTH CARE EDUCATION/TRAINING PROGRAM

## 2025-05-12 PROCEDURE — 25510000001 IOPAMIDOL 61 % SOLUTION: Performed by: STUDENT IN AN ORGANIZED HEALTH CARE EDUCATION/TRAINING PROGRAM

## 2025-05-12 PROCEDURE — 25010000002 ONDANSETRON PER 1 MG: Performed by: STUDENT IN AN ORGANIZED HEALTH CARE EDUCATION/TRAINING PROGRAM

## 2025-05-12 PROCEDURE — 93005 ELECTROCARDIOGRAM TRACING: CPT | Performed by: STUDENT IN AN ORGANIZED HEALTH CARE EDUCATION/TRAINING PROGRAM

## 2025-05-12 PROCEDURE — 25810000003 SODIUM CHLORIDE 0.9 % SOLUTION: Performed by: STUDENT IN AN ORGANIZED HEALTH CARE EDUCATION/TRAINING PROGRAM

## 2025-05-12 PROCEDURE — 83690 ASSAY OF LIPASE: CPT | Performed by: STUDENT IN AN ORGANIZED HEALTH CARE EDUCATION/TRAINING PROGRAM

## 2025-05-12 PROCEDURE — 96374 THER/PROPH/DIAG INJ IV PUSH: CPT

## 2025-05-12 PROCEDURE — 81003 URINALYSIS AUTO W/O SCOPE: CPT | Performed by: STUDENT IN AN ORGANIZED HEALTH CARE EDUCATION/TRAINING PROGRAM

## 2025-05-12 RX ORDER — SODIUM CHLORIDE 0.9 % (FLUSH) 0.9 %
10 SYRINGE (ML) INJECTION AS NEEDED
Status: DISCONTINUED | OUTPATIENT
Start: 2025-05-12 | End: 2025-05-12 | Stop reason: HOSPADM

## 2025-05-12 RX ORDER — ONDANSETRON 2 MG/ML
4 INJECTION INTRAMUSCULAR; INTRAVENOUS ONCE
Status: COMPLETED | OUTPATIENT
Start: 2025-05-12 | End: 2025-05-12

## 2025-05-12 RX ORDER — IOPAMIDOL 612 MG/ML
100 INJECTION, SOLUTION INTRAVASCULAR
Status: COMPLETED | OUTPATIENT
Start: 2025-05-12 | End: 2025-05-12

## 2025-05-12 RX ORDER — OXYCODONE AND ACETAMINOPHEN 5; 325 MG/1; MG/1
1 TABLET ORAL ONCE
Refills: 0 | Status: COMPLETED | OUTPATIENT
Start: 2025-05-12 | End: 2025-05-12

## 2025-05-12 RX ADMIN — SODIUM CHLORIDE 1000 ML: 9 INJECTION, SOLUTION INTRAVENOUS at 17:15

## 2025-05-12 RX ADMIN — ONDANSETRON 4 MG: 2 INJECTION INTRAMUSCULAR; INTRAVENOUS at 17:15

## 2025-05-12 RX ADMIN — OXYCODONE HYDROCHLORIDE AND ACETAMINOPHEN 1 TABLET: 5; 325 TABLET ORAL at 19:56

## 2025-05-12 RX ADMIN — IOPAMIDOL 100 ML: 612 INJECTION, SOLUTION INTRAVENOUS at 19:42

## 2025-05-12 NOTE — Clinical Note
[facility] EMERGENCY DEPARTMENT  [address] [facility] [address]  Phone: [phone]    Luz Maria Andrade was seen and treated in our emergency department on 5/12/2025.  She may return to work on 05/15/2025.         Thank you for choosing [facility].    Will Lucero MD

## 2025-05-12 NOTE — ED PROVIDER NOTES
Subjective:  History of Present Illness:    Patient is a 44-year-old female with history of obesity status post gastric bypass, GERD, nephrolithiasis, polycystic ovarian syndrome, lumbosacral pain with MRI performed diversity [place] less than 48 hours ago who presents today with back pain and pubic pain.  Reports increasing abdominal pain, difficult to tolerate p.o. during this time.  Denies any fevers.  No chest pain.  States that she has been stooling to her baseline.  Denies any dysuria.  Also reports issues with urinary control however, was able to provide urine sample in our emergency department.  Denies any paresthesias to the perineum.  Ambulatory throughout the emergency department.  No concern for cauda equina on MRI read from [facility] as described above.      Nurses Notes reviewed and agree, including vitals, allergies, social history and prior medical history.     REVIEW OF SYSTEMS: All systems reviewed and not pertinent unless noted.  Review of Systems   Constitutional:  Positive for activity change, appetite change and chills. Negative for fatigue and fever.   HENT:  Negative for rhinorrhea, sinus pressure and sinus pain.    Eyes:  Negative for discharge and itching.   Respiratory:  Negative for cough and shortness of breath.    Cardiovascular:  Negative for chest pain and leg swelling.   Gastrointestinal:  Positive for abdominal pain, diarrhea, nausea and vomiting. Negative for abdominal distention, blood in stool and constipation.   Endocrine: Negative for cold intolerance and heat intolerance.   Genitourinary:  Negative for decreased urine volume, difficulty urinating, flank pain, frequency, urgency, vaginal bleeding, vaginal discharge and vaginal pain.   Musculoskeletal:  Positive for back pain. Negative for gait problem, neck pain and neck stiffness.   Skin:  Negative for color change.   Allergic/Immunologic: Negative for environmental allergies.   Neurological:  Negative for seizures,  syncope, facial asymmetry and speech difficulty.   Psychiatric/Behavioral:  Negative for self-injury and suicidal ideas.        Past Medical History:   Diagnosis Date    Anemia     Anxiety     Bleeding disorder     Free bleeding but no hemophelia    Clotting disorder     Ear piercing     Epilepsy     last seizure 3/2022    GERD (gastroesophageal reflux disease)     History of Holter monitoring 01/18/2022    Hx of echocardiogram 12/10/2021    Irregular heart beat     Kidney stone     Murmur     Ovarian cyst     Polycystic ovary syndrome     PONV (postoperative nausea and vomiting)     Seasonal allergies     Spinal headache     Syncope 2021       Allergies:    Patient has no known allergies.      Past Surgical History:   Procedure Laterality Date    CARDIAC ELECTROPHYSIOLOGY PROCEDURE N/A 6/30/2022    Procedure: Loop insertion;  Surgeon: Venkata Arcos MD;  Location: [facility] CATH INVASIVE LOCATION;  Service: Cardiovascular;  Laterality: N/A;    CHOLECYSTECTOMY      DILATATION AND CURETTAGE      EAR TUBES      ENDOSCOPY      EXPLORATORY LAPAROTOMY      GASTRIC BYPASS  2017    GASTRIC BYPASS      TONSILLECTOMY      TOTAL LAPAROSCOPIC HYSTERECTOMY Right 5/3/2022    Procedure: TOTAL LAPAROSCOPIC HYSTERECTOMY,  BILATERTAL SALPINGO-OOPHORECTOMY, CYSTOCOPY;  Surgeon: Taniya Whiteside MD;  Location: [facility] OR;  Service: Obstetrics/Gynecology;  Laterality: Right;         Social History     Socioeconomic History    Marital status:    Tobacco Use    Smoking status: Never    Smokeless tobacco: Never   Vaping Use    Vaping status: Never Used   Substance and Sexual Activity    Alcohol use: No    Drug use: No    Sexual activity: Defer     Partners: Male     Birth control/protection: Surgical         Family History   Problem Relation Age of Onset    Heart failure Father     Bleeding Disorder Father     Heart disease Sister     Clotting disorder Sister        Objective  Physical Exam:  /98   Pulse 73   Temp 97.8 °F (36.6  "°C) (Oral)   Resp 17   Ht 154.9 cm (61\")   Wt 93.9 kg (207 lb)   LMP 04/07/2022 (Exact Date)   SpO2 100%   BMI 39.11 kg/m²      Physical Exam  Constitutional:       General: She is not in acute distress.     Appearance: Normal appearance. She is obese. She is not ill-appearing.   HENT:      Head: Normocephalic and atraumatic.      Nose: Nose normal. No congestion or rhinorrhea.      Mouth/Throat:      Mouth: Mucous membranes are dry.      Pharynx: Oropharynx is clear. No oropharyngeal exudate or posterior oropharyngeal erythema.   Eyes:      Extraocular Movements: Extraocular movements intact.      Conjunctiva/sclera: Conjunctivae normal.      Pupils: Pupils are equal, round, and reactive to light.   Cardiovascular:      Rate and Rhythm: Normal rate and regular rhythm.      Pulses: Normal pulses.      Heart sounds: Normal heart sounds.   Pulmonary:      Effort: Pulmonary effort is normal. No respiratory distress.      Breath sounds: Normal breath sounds.   Abdominal:      General: Abdomen is flat. Bowel sounds are normal. There is no distension.      Palpations: Abdomen is soft.      Tenderness: There is no abdominal tenderness. There is no right CVA tenderness, left CVA tenderness, guarding or rebound.      Comments: Unreactive to abdominal palpation at the time of my exam   Musculoskeletal:         General: No swelling or tenderness. Normal range of motion.      Cervical back: Normal range of motion and neck supple.   Skin:     General: Skin is warm and dry.      Capillary Refill: Capillary refill takes less than 2 seconds.   Neurological:      General: No focal deficit present.      Mental Status: She is alert and oriented to person, place, and time. Mental status is at baseline.      Cranial Nerves: No cranial nerve deficit.      Sensory: No sensory deficit.      Motor: No weakness.      Coordination: Coordination normal.   Psychiatric:         Mood and Affect: Mood normal.         Behavior: Behavior " normal.         Thought Content: Thought content normal.         Judgment: Judgment normal.         Procedures    ED Course:    ED Course as of 05/12/25 2106   Mon May 12, 2025   1714 EKG interpreted by me, normal sinus rhythm with no concerning ST changes noted, rate of 68 [JE]      ED Course User Index  [JE] Will Lucero MD       Lab Results (last 24 hours)       Procedure Component Value Units Date/Time    CBC & Differential [[id number]]  (Abnormal) Collected: 05/12/25 1640    Specimen: Blood Updated: 05/12/25 1646    Narrative:      The following orders were created for panel order CBC & Differential.  Procedure                               Abnormality         Status                     ---------                               -----------         ------                     CBC Auto Differential[[id number]]        Abnormal            Final result                 Please view results for these tests on the individual orders.    Comprehensive Metabolic Panel [[id number]] Collected: 05/12/25 1640    Specimen: Blood Updated: 05/12/25 1703     Glucose 86 mg/dL      BUN 10 mg/dL      Creatinine 0.80 mg/dL      Sodium 141 mmol/L      Potassium 4.0 mmol/L      Chloride 105 mmol/L      CO2 24.2 mmol/L      Calcium 9.2 mg/dL      Total Protein 7.4 g/dL      Albumin 4.1 g/dL      ALT (SGPT) 12 U/L      AST (SGOT) 17 U/L      Alkaline Phosphatase 117 U/L      Total Bilirubin 0.2 mg/dL      Globulin 3.3 gm/dL      A/G Ratio 1.2 g/dL      BUN/Creatinine Ratio 12.5     Anion Gap 11.8 mmol/L      eGFR 93.9 mL/min/1.73     Narrative:      GFR Categories in Chronic Kidney Disease (CKD)              GFR Category          GFR (mL/min/1.73)    Interpretation  G1                    90 or greater        Normal or high (1)  G2                    60-89                Mild decrease (1)  G3a                   45-59                Mild to moderate decrease  G3b                   30-44                Moderate to severe decrease  G4                     15-29                Severe decrease  G5                    14 or less           Kidney failure    (1)In the absence of evidence of kidney disease, neither GFR category G1 or G2 fulfill the criteria for CKD.    eGFR calculation 2021 CKD-EPI creatinine equation, which does not include race as a factor    Lipase [[id number]]  (Normal) Collected: 05/12/25 1640    Specimen: Blood Updated: 05/12/25 1703     Lipase 25 U/L     CBC Auto Differential [[id number]]  (Abnormal) Collected: 05/12/25 1640    Specimen: Blood Updated: 05/12/25 1646     WBC 6.98 10*3/mm3      RBC 4.82 10*6/mm3      Hemoglobin 11.6 g/dL      Hematocrit 37.3 %      MCV 77.4 fL      MCH 24.1 pg      MCHC 31.1 g/dL      RDW 16.0 %      RDW-SD 44.8 fl      MPV 9.7 fL      Platelets 327 10*3/mm3      Neutrophil % 63.3 %      Lymphocyte % 28.2 %      Monocyte % 5.3 %      Eosinophil % 2.3 %      Basophil % 0.6 %      Immature Grans % 0.3 %      Neutrophils, Absolute 4.42 10*3/mm3      Lymphocytes, Absolute 1.97 10*3/mm3      Monocytes, Absolute 0.37 10*3/mm3      Eosinophils, Absolute 0.16 10*3/mm3      Basophils, Absolute 0.04 10*3/mm3      Immature Grans, Absolute 0.02 10*3/mm3      nRBC 0.0 /100 WBC     C-reactive Protein [[id number]]  (Abnormal) Collected: 05/12/25 1640    Specimen: Blood Updated: 05/12/25 1703     C-Reactive Protein 0.62 mg/dL     Lactic Acid, Plasma [[id number]]  (Normal) Collected: 05/12/25 1640    Specimen: Blood Updated: 05/12/25 1707     Lactate 1.3 mmol/L     Magnesium [[id number]]  (Normal) Collected: 05/12/25 1640    Specimen: Blood Updated: 05/12/25 1703     Magnesium 2.2 mg/dL     Urinalysis With Culture If Indicated - Urine, Clean Catch [[id number]]  (Normal) Collected: 05/12/25 1700    Specimen: Urine, Clean Catch Updated: 05/12/25 1712     Color, UA Yellow     Appearance, UA Clear     pH, UA 6.0     Specific Gravity, UA 1.022     Glucose, UA Negative     Ketones, UA Negative     Bilirubin, UA Negative     Blood, UA  Negative     Protein, UA Negative     Leuk Esterase, UA Negative     Nitrite, UA Negative     Urobilinogen, UA 1.0 E.U./dL    Narrative:      In absence of clinical symptoms, the presence of pyuria, bacteria, and/or nitrites on the urinalysis result does not correlate with infection.  Urine microscopic not indicated.             CT Abdomen Pelvis With Contrast  Result Date: 5/12/2025  FINAL REPORT TECHNIQUE: null CLINICAL HISTORY: Suprapubic pain, nausea, vomiting, history of Kate-en-Y, eval diverticulitis, COMPARISON: null FINDINGS: CT abdomen and pelvis with contrast Comparison: None Findings: No consolidation at the lung bases. Status post cholecystectomy and hysterectomy. Underdistended mildly thick-walled bladder. No hydronephrosis or nephrolithiasis. No focal decreased enhancement of the kidneys. The other solid organs are unremarkable. Small hiatal hernia. Status post Kate-en-Y gastrojejunostomy. No bowel dilation. No definitive bowel wall thickening; there are portions of the small bowel and colon which are underdistended. Increased submucosal fat in the proximal colon could be secondary to body habitus or chronic inflammation. A normal appendix is identified. Colonic diverticulosis. No inflamed diverticula or pericolonic stranding. No aneurysm. No calcified atherosclerotic disease. No lymphadenopathy. No ascites. No acute osseous abnormality.     Impression: Impression: Underdistended mildly thick-walled bladder. Correlate with urinalysis to exclude cystitis. Authenticated and Electronically Signed by Katiana Valderrama MD on 05/12/2025 08:24:18 PM    MRI Cyberknife Lumbar Spine Without Contrast  Result Date: 5/11/2025  CLINICAL INDICATION: Low back pain, saddle anesthesia TECHNIQUE: Multiplanar multiecho sequences were obtained through the lumbar spine utilizing T1 and T2 weighting without the administration of intravenous contrast. 7 series were obtained including localizer series. COMPARISON: CT  abdomen/pelvis May 11, 2025. FINDINGS: Diagnostic Quality: Adequate. Anatomic Variants: None. Alignment: The bony alignment is normal. Marrow: There is a T1 hyperintense focus within the L5 vertebral body likely representing vertebral body hemangioma or focal marrow fat (series 3 image 10). Otherwise, marrow signal is within normal limits. Vertebrae and Intervertebral Discs: Vertebral body and disc space heights are normal. Mild multilevel intervertebral disc dehydration, most pronounced at L4-L5. Ligaments: The anterior and posterior longitudinal ligaments and interspinous ligaments are intact. Conus: The conus is of normal caliber without abnormal intrinsic signal. The conus terminates at the L1 level. Degenerative changes are as follows: L1-L2: Mild bilateral facet arthropathy. No significant spinal canal or neural foraminal narrowing. L2-L3: Mild bilateral facet arthropathy. No significant spinal canal or neural foraminal narrowing. L3-L4: Mild bilateral facet arthropathy. Ligamentum flavum thickening. No significant spinal canal or neural foraminal narrowing. L4-L5: Mild bilateral facet arthropathy. Ligamentum flavum thickening. No significant spinal canal narrowing. Mild left neural foraminal narrowing. No no significant right neural foraminal narrowing. L5-S1: Mild bilateral facet arthropathy. No significant spinal canal or neural foraminal narrowing. Prevertebral and Paraspinal Soft Tissues: There is no prevertebral or paraspinal soft tissue swelling or mass.    Impression: Overall mild multilevel degenerative changes of the lumbar spine, including mild left L4 neural foraminal narrowing. No significant spinal canal narrowing. CRITICAL RESULT:   No. COMMUNICATION: Per this written report. Preliminary report signed by Bee Garzon DO on 5/11/2025 7:01 AM By electronically signing this report, I, the attending physician, attest that I have personally reviewed the images/data for the above examination(s)  and agree with the final edited report. Drafted by Bee Garzon DO on 5/11/2025 6:47 AM Final report signed by Martha Yuan MD on 5/11/2025 8:04 AM    CT Abdomen Pelvis With Contrast  Result Date: 5/11/2025  CLINICAL INDICATION: abdominal pain TECHNIQUE: Imaging of the abdomen and pelvis was performed, from lung bases through pubic symphysis, using spiral technique, following administration of IV contrast, Omnipaque 300, 100 mL. Delayed (excretory phase) images were performed through the kidneys. Reformatted images in the coronal and sagittal planes were generated from the axial data set to facilitate diagnostic accuracy. Total DLP (Dose-Length Product): 701.47 mGy.cm. Please note: The reported value represents the total of one or more individual components during the CT acquisition on this date and at this time, and as such, the same value may appear in more than one CT report depending on the interpreting/reporting physicians. COMPARISON: None. FINDINGS: Lung Bases: 5 mm ovoid pulmonary nodule in the left lower lobe (series 3, image 11). Otherwise, the lung bases are clear. Liver/Gallbladder/Biliary system: The liver demonstrates homogeneous enhancement. Prior cholecystectomy. No intra- or extra-hepatic biliary ductal dilatation. Spleen: The spleen enhances homogeneously. Likely splenule anterior to the left kidney. Pancreas: The pancreas enhances homogeneously. Adrenals: The adrenals are morphologically unremarkable. Kidneys: The kidneys demonstrate symmetric nephrogram and excretion. No renal or ureteral calculi. No hydronephrosis. Bowel/Mesentery: Postsurgical changes of Kate-en-Y gastric bypass without any apparent complication. Previously ingested material within the gastric pouch. Postsurgical changes of previous small bowel resection with anastomosis in the left lower quadrant without any apparent complication. The small bowel loops are not dilated. The large bowel loops are not dilated. The appendix is  visualized and normal. Small hiatal hernia. No bowel obstruction. No internal hernia. Vessels/Lymph Nodes: The abdominal aorta is unremarkable. No lymphadenopathy within the abdomen or pelvis. Fluid Survey: No free fluid in the abdomen. No free fluid in the pelvis. Pelvis: The pelvic viscera are unremarkable. Body Wall: Normal. Bones: No acute fracture.    Impression: No acute findings in the abdomen and pelvis. Incidentally noted ovoid 5 mm left lower lobe pulmonary nodule. Consider follow-up CT chest in 12 months per Fleischner guidelines, if nodule is not already known/under surveillance. CRITICAL RESULT:    No. COMMUNICATION: A Per this written report. Preliminary report signed by Eric Kapadia MD on 5/11/2025 2:08 AM By electronically signing this report, I, the attending physician, attest that I have personally reviewed the images/data for the above examination(s) and agree with the final edited report. Drafted by Eric Kapadia MD on 5/11/2025 1:52 AM Final report signed by Sj Lino MD on 5/11/2025 2:16 AM         MDM      Initial impression of presenting illness: Abdominal pain, back pain    DDX: includes but is not limited to: Gastroenteritis, SBO, appendicitis, diverticulitis    Patient arrives stable with vitals interpreted by myself.     Pertinent features from physical exam: Clear to auscultation, regular rate and rhythm, no murmur, nontender to abdominal palpation.    Initial diagnostic plan: CBC, CMP, lipase, UA, CRP, lactic acid, CT abdomen pelvis    Results from initial plan were reviewed and interpreted by me revealing no concerns for significant intra-abdominal process, possible urinary tract infection however, UA was negative    Diagnostic information from other sources: Discussed with patient's  at the bedside reviewed past medical records    Interventions / Re-evaluation: Given IV fluids, Zofran for symptom control    Results/clinical rationale were discussed with patient at  bedside    Consultations/Discussion of results with other physicians: Discussed negative workup in emergency department, no concern for intra-abdominal process, sepsis or urinary tract infection.  Encourage patient continue to follow with neurosurgery as already been referred to in the [facility] and her primary care doctor.  Also encourage patient to take the medications prescribed to her for back pain yesterday.  Patient had received an MRI yesterday and given the recency of this imaging I did not believe it would be beneficial to repeat this imaging in our emergency department.  Patient voiced understanding.  Strict turn precautions for inability to ambulate, fevers.  Severe increase in abdominal pain.    Disposition plan: Discharge  -----        Final diagnoses:   Generalized abdominal pain          Will Lucero MD  05/12/25 [id number]

## 2025-05-12 NOTE — ED NOTES
Pt. Ambulated independently to the bathroom to provide a urine sample ATT. Pt. Had no complaints or symptoms while ambulating.

## 2025-05-13 NOTE — DISCHARGE INSTRUCTIONS
You were evaluated for abdominal pain.  We got lab work and a CT scan which showed no concerns for any acute process.  We had no concerns for urinary tract infection.  You are now stable for discharge.  We recommend following with your primary care doctor to ensure that you improve appropriately.  If you experience any severe increase in abdominal pain, fever or new neurologic symptoms please come back to the Emergency Department for further evaluation.  Otherwise, would also recommend that you follow-up with neurosurgery at the [facility] as already referred.  Do not step discharge.

## 2025-06-04 ENCOUNTER — TRANSCRIBE ORDERS (OUTPATIENT)
Dept: CT IMAGING | Facility: HOSPITAL | Age: 45
End: 2025-06-04

## 2025-06-04 DIAGNOSIS — R91.1 SOLID NODULE OF LUNG LESS THAN 6 MM IN DIAMETER: Primary | ICD-10-CM

## 2025-06-11 ENCOUNTER — HOSPITAL ENCOUNTER (OUTPATIENT)
Dept: CT IMAGING | Facility: HOSPITAL | Age: 45
Discharge: HOME | End: 2025-06-11
Payer: MEDICAID

## 2025-06-11 PROCEDURE — 71250 CT THORAX DX C-: CPT

## (undated) DEVICE — SOL NACL 0.9PCT 1000ML

## (undated) DEVICE — MARKR SKIN W/RULR

## (undated) DEVICE — SUTURING DEVICE: Brand: ENDO STITCH

## (undated) DEVICE — SYR LUERLOK 50ML

## (undated) DEVICE — 4-PORT MANIFOLD: Brand: NEPTUNE 2

## (undated) DEVICE — RICH LAVH: Brand: MEDLINE INDUSTRIES, INC.

## (undated) DEVICE — 2, DISPOSABLE SUCTION/IRRIGATOR WITHOUT DISPOSABLE TIP: Brand: STRYKEFLOW

## (undated) DEVICE — CONN TBG Y 5 IN 1 LF STRL

## (undated) DEVICE — SHEET,T,THYROID,STERILE: Brand: MEDLINE

## (undated) DEVICE — MANIP UTER RUMI TP 6.7MMX8CM BLU

## (undated) DEVICE — SPNG GZ WOVN 4X4IN 12PLY 10/BX STRL

## (undated) DEVICE — SUT ETHLN 4/0 PS2 PLSTC 1667G

## (undated) DEVICE — PREP SOL POVIDONE/IODINE BT 4OZ

## (undated) DEVICE — SOL IRR H2O BTL 1000ML STRL

## (undated) DEVICE — FLTR PLUMEPORT LAP W/CONN STRL

## (undated) DEVICE — PAD STEEP TRENDELENBURG W/RAIL STRAP INTEGR ARM PROTECT WING

## (undated) DEVICE — HDRST POSTN SLOTTED A/

## (undated) DEVICE — ENDOPATH XCEL BLADELESS TROCARS WITH STABILITY SLEEVES: Brand: ENDOPATH XCEL

## (undated) DEVICE — ST IRR CYSTO W/SPK 77IN LF

## (undated) DEVICE — SUT VIC 3/0 SH 27IN J416H

## (undated) DEVICE — SYR CONTRL PRESS/LO FIX/M/LL W/THMB/RNG 10ML

## (undated) DEVICE — PENCL ES MEGADINE EZ/CLEAN BUTN W/HOLSTR 10FT

## (undated) DEVICE — DEBRIDEMENT KIT: Brand: MEDLINE INDUSTRIES, INC.

## (undated) DEVICE — GLV SURG BIOGEL M LTX PF 6 1/2

## (undated) DEVICE — MANIP UTER ARCH KOHEFFICIENT 3.0CM

## (undated) DEVICE — HARMONIC 1100 SHEARS, 36CM SHAFT LENGTH: Brand: HARMONIC

## (undated) DEVICE — SUT VIC 0 CT 36IN J958H

## (undated) DEVICE — COVER,MAYO STAND,XL,STERILE: Brand: MEDLINE

## (undated) DEVICE — SLV SCD CALF HEMOFORCE DVT THERP REPROC MD